# Patient Record
Sex: FEMALE | Race: WHITE | NOT HISPANIC OR LATINO | Employment: FULL TIME | URBAN - NONMETROPOLITAN AREA
[De-identification: names, ages, dates, MRNs, and addresses within clinical notes are randomized per-mention and may not be internally consistent; named-entity substitution may affect disease eponyms.]

---

## 2017-10-13 ENCOUNTER — TRANSCRIBE ORDERS (OUTPATIENT)
Dept: ADMINISTRATIVE | Facility: HOSPITAL | Age: 62
End: 2017-10-13

## 2017-10-13 DIAGNOSIS — Z12.31 ENCOUNTER FOR SCREENING MAMMOGRAM FOR MALIGNANT NEOPLASM OF BREAST: Primary | ICD-10-CM

## 2017-10-23 ENCOUNTER — HOSPITAL ENCOUNTER (OUTPATIENT)
Dept: MAMMOGRAPHY | Facility: HOSPITAL | Age: 62
Discharge: HOME | End: 2017-10-23
Admitting: FAMILY MEDICINE

## 2017-10-23 DIAGNOSIS — Z12.31 ENCOUNTER FOR SCREENING MAMMOGRAM FOR MALIGNANT NEOPLASM OF BREAST: ICD-10-CM

## 2017-10-23 PROCEDURE — 77063 BREAST TOMOSYNTHESIS BI: CPT

## 2017-10-23 PROCEDURE — G0202 SCR MAMMO BI INCL CAD: HCPCS

## 2018-10-17 ENCOUNTER — TRANSCRIBE ORDERS (OUTPATIENT)
Dept: ADMINISTRATIVE | Facility: HOSPITAL | Age: 63
End: 2018-10-17

## 2018-10-17 DIAGNOSIS — R92.8 ABNORMAL MAMMOGRAM: Primary | ICD-10-CM

## 2018-10-22 ENCOUNTER — APPOINTMENT (OUTPATIENT)
Dept: MAMMOGRAPHY | Facility: HOSPITAL | Age: 63
End: 2018-10-22

## 2018-10-29 ENCOUNTER — HOSPITAL ENCOUNTER (OUTPATIENT)
Dept: ULTRASOUND IMAGING | Facility: HOSPITAL | Age: 63
Discharge: HOME | End: 2018-10-29

## 2018-10-29 ENCOUNTER — HOSPITAL ENCOUNTER (OUTPATIENT)
Dept: MAMMOGRAPHY | Facility: HOSPITAL | Age: 63
Discharge: HOME | End: 2018-10-29
Admitting: FAMILY MEDICINE

## 2018-10-29 DIAGNOSIS — R92.8 ABNORMAL MAMMOGRAM: ICD-10-CM

## 2018-10-29 PROCEDURE — 77067 SCR MAMMO BI INCL CAD: CPT

## 2018-10-29 PROCEDURE — 77063 BREAST TOMOSYNTHESIS BI: CPT

## 2019-10-07 ENCOUNTER — TRANSCRIBE ORDERS (OUTPATIENT)
Dept: ADMINISTRATIVE | Facility: HOSPITAL | Age: 64
End: 2019-10-07

## 2019-10-07 DIAGNOSIS — Z12.31 ENCOUNTER FOR SCREENING MAMMOGRAM FOR MALIGNANT NEOPLASM OF BREAST: Primary | ICD-10-CM

## 2019-10-30 ENCOUNTER — HOSPITAL ENCOUNTER (OUTPATIENT)
Dept: MAMMOGRAPHY | Facility: HOSPITAL | Age: 64
Discharge: HOME | End: 2019-10-30
Admitting: FAMILY MEDICINE

## 2019-10-30 DIAGNOSIS — Z12.31 ENCOUNTER FOR SCREENING MAMMOGRAM FOR MALIGNANT NEOPLASM OF BREAST: ICD-10-CM

## 2019-10-30 PROCEDURE — 77063 BREAST TOMOSYNTHESIS BI: CPT

## 2019-10-30 PROCEDURE — 77067 SCR MAMMO BI INCL CAD: CPT

## 2020-09-28 ENCOUNTER — TRANSCRIBE ORDERS (OUTPATIENT)
Dept: ADMINISTRATIVE | Facility: HOSPITAL | Age: 65
End: 2020-09-28

## 2020-09-28 DIAGNOSIS — Z12.31 ENCOUNTER FOR SCREENING MAMMOGRAM FOR MALIGNANT NEOPLASM OF BREAST: Primary | ICD-10-CM

## 2020-11-02 ENCOUNTER — HOSPITAL ENCOUNTER (OUTPATIENT)
Dept: MAMMOGRAPHY | Facility: HOSPITAL | Age: 65
Discharge: HOME | End: 2020-11-02
Admitting: FAMILY MEDICINE

## 2020-11-02 DIAGNOSIS — Z12.31 ENCOUNTER FOR SCREENING MAMMOGRAM FOR MALIGNANT NEOPLASM OF BREAST: ICD-10-CM

## 2020-11-02 PROCEDURE — 77067 SCR MAMMO BI INCL CAD: CPT

## 2020-11-02 PROCEDURE — 77063 BREAST TOMOSYNTHESIS BI: CPT

## 2021-04-27 ENCOUNTER — APPOINTMENT (OUTPATIENT)
Dept: GENERAL RADIOLOGY | Age: 66
DRG: 378 | End: 2021-04-27
Payer: MEDICARE

## 2021-04-27 ENCOUNTER — APPOINTMENT (OUTPATIENT)
Dept: CT IMAGING | Age: 66
DRG: 378 | End: 2021-04-27
Payer: MEDICARE

## 2021-04-27 ENCOUNTER — HOSPITAL ENCOUNTER (INPATIENT)
Age: 66
LOS: 3 days | Discharge: HOME HEALTH | DRG: 378 | End: 2021-04-30
Attending: EMERGENCY MEDICINE | Admitting: FAMILY MEDICINE
Payer: MEDICARE

## 2021-04-27 DIAGNOSIS — K92.2 GASTROINTESTINAL HEMORRHAGE, UNSPECIFIED GASTROINTESTINAL HEMORRHAGE TYPE: Primary | ICD-10-CM

## 2021-04-27 DIAGNOSIS — S01.21XA LACERATION OF NOSE, INITIAL ENCOUNTER: ICD-10-CM

## 2021-04-27 DIAGNOSIS — K92.2 GI BLEED: ICD-10-CM

## 2021-04-27 DIAGNOSIS — E87.6 HYPOKALEMIA: ICD-10-CM

## 2021-04-27 DIAGNOSIS — R55 SYNCOPE AND COLLAPSE: ICD-10-CM

## 2021-04-27 DIAGNOSIS — S09.90XA CLOSED HEAD INJURY, INITIAL ENCOUNTER: ICD-10-CM

## 2021-04-27 PROBLEM — K21.9 GASTROESOPHAGEAL REFLUX DISEASE: Status: ACTIVE | Noted: 2021-04-27

## 2021-04-27 PROBLEM — D68.51 FACTOR V LEIDEN MUTATION (HCC): Status: ACTIVE | Noted: 2021-04-27

## 2021-04-27 PROBLEM — I10 HYPERTENSION: Status: ACTIVE | Noted: 2021-04-27

## 2021-04-27 PROBLEM — D68.62 LUPUS ANTICOAGULANT DISORDER (HCC): Status: ACTIVE | Noted: 2021-04-27

## 2021-04-27 PROBLEM — D68.59 PROTEIN S DEFICIENCY (HCC): Status: ACTIVE | Noted: 2021-04-27

## 2021-04-27 LAB
ABO/RH: NORMAL
ALBUMIN SERPL-MCNC: 3.7 G/DL (ref 3.5–5.2)
ALP BLD-CCNC: 50 U/L (ref 35–104)
ALT SERPL-CCNC: 7 U/L (ref 5–33)
ANION GAP SERPL CALCULATED.3IONS-SCNC: 13 MMOL/L (ref 7–19)
ANION GAP SERPL CALCULATED.3IONS-SCNC: 14 MMOL/L (ref 7–19)
ANTIBODY SCREEN: NORMAL
APTT: 32 SEC (ref 26–36.2)
AST SERPL-CCNC: 17 U/L (ref 5–32)
BASOPHILS ABSOLUTE: 0 K/UL (ref 0–0.2)
BASOPHILS RELATIVE PERCENT: 0.3 % (ref 0–1)
BILIRUB SERPL-MCNC: <0.2 MG/DL (ref 0.2–1.2)
BLOOD BANK DISPENSE STATUS: NORMAL
BLOOD BANK DISPENSE STATUS: NORMAL
BLOOD BANK PRODUCT CODE: NORMAL
BLOOD BANK PRODUCT CODE: NORMAL
BPU ID: NORMAL
BPU ID: NORMAL
BUN BLDV-MCNC: 51 MG/DL (ref 8–23)
BUN BLDV-MCNC: 54 MG/DL (ref 8–23)
CALCIUM SERPL-MCNC: 7 MG/DL (ref 8.8–10.2)
CALCIUM SERPL-MCNC: 7.3 MG/DL (ref 8.8–10.2)
CHLORIDE BLD-SCNC: 100 MMOL/L (ref 98–111)
CHLORIDE BLD-SCNC: 105 MMOL/L (ref 98–111)
CO2: 23 MMOL/L (ref 22–29)
CO2: 26 MMOL/L (ref 22–29)
CREAT SERPL-MCNC: 0.7 MG/DL (ref 0.5–0.9)
CREAT SERPL-MCNC: 0.8 MG/DL (ref 0.5–0.9)
DESCRIPTION BLOOD BANK: NORMAL
DESCRIPTION BLOOD BANK: NORMAL
EKG P AXIS: 74 DEGREES
EKG P-R INTERVAL: 108 MS
EKG Q-T INTERVAL: 380 MS
EKG QRS DURATION: 98 MS
EKG QTC CALCULATION (BAZETT): 430 MS
EKG T AXIS: 64 DEGREES
EOSINOPHILS ABSOLUTE: 0 K/UL (ref 0–0.6)
EOSINOPHILS RELATIVE PERCENT: 0.2 % (ref 0–5)
FERRITIN: 33.5 NG/ML (ref 13–150)
GFR AFRICAN AMERICAN: >59
GFR AFRICAN AMERICAN: >59
GFR NON-AFRICAN AMERICAN: >60
GFR NON-AFRICAN AMERICAN: >60
GLUCOSE BLD-MCNC: 100 MG/DL (ref 74–109)
GLUCOSE BLD-MCNC: 89 MG/DL (ref 74–109)
HAPTOGLOBIN: 250 MG/DL (ref 30–200)
HCT VFR BLD CALC: 20 % (ref 37–47)
HCT VFR BLD CALC: 21.9 % (ref 37–47)
HCT VFR BLD CALC: 22.5 % (ref 37–47)
HCT VFR BLD CALC: 25.8 % (ref 37–47)
HCT VFR BLD CALC: 28.9 % (ref 37–47)
HEMOGLOBIN: 6.6 G/DL (ref 12–16)
HEMOGLOBIN: 7.1 G/DL (ref 12–16)
HEMOGLOBIN: 7.2 G/DL (ref 12–16)
HEMOGLOBIN: 9.2 G/DL (ref 12–16)
IMMATURE GRANULOCYTES #: 0 K/UL
INR BLD: 2.88 (ref 0.88–1.18)
IRON SATURATION: 40 % (ref 14–50)
IRON: 122 UG/DL (ref 37–145)
LYMPHOCYTES ABSOLUTE: 1.3 K/UL (ref 1.1–4.5)
LYMPHOCYTES RELATIVE PERCENT: 14.5 % (ref 20–40)
MAGNESIUM: 1.6 MG/DL (ref 1.6–2.4)
MCH RBC QN AUTO: 29.1 PG (ref 27–31)
MCHC RBC AUTO-ENTMCNC: 31.8 G/DL (ref 33–37)
MCV RBC AUTO: 91.5 FL (ref 81–99)
MONOCYTES ABSOLUTE: 0.4 K/UL (ref 0–0.9)
MONOCYTES RELATIVE PERCENT: 4.7 % (ref 0–10)
NEUTROPHILS ABSOLUTE: 7.2 K/UL (ref 1.5–7.5)
NEUTROPHILS RELATIVE PERCENT: 80 % (ref 50–65)
PDW BLD-RTO: 13 % (ref 11.5–14.5)
PLATELET # BLD: 242 K/UL (ref 130–400)
PMV BLD AUTO: 9.8 FL (ref 9.4–12.3)
POTASSIUM REFLEX MAGNESIUM: 3.3 MMOL/L (ref 3.5–5)
POTASSIUM SERPL-SCNC: 3 MMOL/L (ref 3.5–5)
PROTHROMBIN TIME: 31 SEC (ref 12–14.6)
RBC # BLD: 3.16 M/UL (ref 4.2–5.4)
REASON FOR REJECTION: NORMAL
REJECTED TEST: NORMAL
RETICULOCYTE ABSOLUTE COUNT: 0.06 M/UL (ref 0.03–0.12)
RETICULOCYTE COUNT PCT: 2.03 % (ref 0.5–1.5)
SARS-COV-2, NAAT: NOT DETECTED
SODIUM BLD-SCNC: 140 MMOL/L (ref 136–145)
SODIUM BLD-SCNC: 141 MMOL/L (ref 136–145)
TOTAL IRON BINDING CAPACITY: 303 UG/DL (ref 250–400)
TOTAL PROTEIN: 5.8 G/DL (ref 6.6–8.7)
TROPONIN: <0.01 NG/ML (ref 0–0.03)
WBC # BLD: 9 K/UL (ref 4.8–10.8)

## 2021-04-27 PROCEDURE — 70450 CT HEAD/BRAIN W/O DYE: CPT

## 2021-04-27 PROCEDURE — 83550 IRON BINDING TEST: CPT

## 2021-04-27 PROCEDURE — 99222 1ST HOSP IP/OBS MODERATE 55: CPT | Performed by: INTERNAL MEDICINE

## 2021-04-27 PROCEDURE — C9113 INJ PANTOPRAZOLE SODIUM, VIA: HCPCS | Performed by: EMERGENCY MEDICINE

## 2021-04-27 PROCEDURE — 6360000002 HC RX W HCPCS: Performed by: EMERGENCY MEDICINE

## 2021-04-27 PROCEDURE — 2580000003 HC RX 258: Performed by: EMERGENCY MEDICINE

## 2021-04-27 PROCEDURE — 90715 TDAP VACCINE 7 YRS/> IM: CPT | Performed by: EMERGENCY MEDICINE

## 2021-04-27 PROCEDURE — 70486 CT MAXILLOFACIAL W/O DYE: CPT

## 2021-04-27 PROCEDURE — 85610 PROTHROMBIN TIME: CPT

## 2021-04-27 PROCEDURE — 83735 ASSAY OF MAGNESIUM: CPT

## 2021-04-27 PROCEDURE — 09QKXZZ REPAIR NASAL MUCOSA AND SOFT TISSUE, EXTERNAL APPROACH: ICD-10-PCS | Performed by: EMERGENCY MEDICINE

## 2021-04-27 PROCEDURE — 86901 BLOOD TYPING SEROLOGIC RH(D): CPT

## 2021-04-27 PROCEDURE — 85018 HEMOGLOBIN: CPT

## 2021-04-27 PROCEDURE — 1210000000 HC MED SURG R&B

## 2021-04-27 PROCEDURE — 6370000000 HC RX 637 (ALT 250 FOR IP): Performed by: HOSPITALIST

## 2021-04-27 PROCEDURE — 93005 ELECTROCARDIOGRAM TRACING: CPT | Performed by: EMERGENCY MEDICINE

## 2021-04-27 PROCEDURE — 12011 RPR F/E/E/N/L/M 2.5 CM/<: CPT

## 2021-04-27 PROCEDURE — 85045 AUTOMATED RETICULOCYTE COUNT: CPT

## 2021-04-27 PROCEDURE — 6360000002 HC RX W HCPCS: Performed by: HOSPITALIST

## 2021-04-27 PROCEDURE — 80053 COMPREHEN METABOLIC PANEL: CPT

## 2021-04-27 PROCEDURE — 2580000003 HC RX 258: Performed by: HOSPITALIST

## 2021-04-27 PROCEDURE — P9016 RBC LEUKOCYTES REDUCED: HCPCS

## 2021-04-27 PROCEDURE — 83540 ASSAY OF IRON: CPT

## 2021-04-27 PROCEDURE — 36430 TRANSFUSION BLD/BLD COMPNT: CPT

## 2021-04-27 PROCEDURE — 74177 CT ABD & PELVIS W/CONTRAST: CPT

## 2021-04-27 PROCEDURE — 84484 ASSAY OF TROPONIN QUANT: CPT

## 2021-04-27 PROCEDURE — 87635 SARS-COV-2 COVID-19 AMP PRB: CPT

## 2021-04-27 PROCEDURE — 6360000004 HC RX CONTRAST MEDICATION: Performed by: EMERGENCY MEDICINE

## 2021-04-27 PROCEDURE — 99284 EMERGENCY DEPT VISIT MOD MDM: CPT

## 2021-04-27 PROCEDURE — 85014 HEMATOCRIT: CPT

## 2021-04-27 PROCEDURE — 93010 ELECTROCARDIOGRAM REPORT: CPT | Performed by: INTERNAL MEDICINE

## 2021-04-27 PROCEDURE — 86920 COMPATIBILITY TEST SPIN: CPT

## 2021-04-27 PROCEDURE — 83010 ASSAY OF HAPTOGLOBIN QUANT: CPT

## 2021-04-27 PROCEDURE — 96374 THER/PROPH/DIAG INJ IV PUSH: CPT

## 2021-04-27 PROCEDURE — 86850 RBC ANTIBODY SCREEN: CPT

## 2021-04-27 PROCEDURE — 2500000003 HC RX 250 WO HCPCS: Performed by: HOSPITALIST

## 2021-04-27 PROCEDURE — 0DB78ZX EXCISION OF STOMACH, PYLORUS, VIA NATURAL OR ARTIFICIAL OPENING ENDOSCOPIC, DIAGNOSTIC: ICD-10-PCS | Performed by: INTERNAL MEDICINE

## 2021-04-27 PROCEDURE — 85025 COMPLETE CBC W/AUTO DIFF WBC: CPT

## 2021-04-27 PROCEDURE — 36415 COLL VENOUS BLD VENIPUNCTURE: CPT

## 2021-04-27 PROCEDURE — C9113 INJ PANTOPRAZOLE SODIUM, VIA: HCPCS | Performed by: HOSPITALIST

## 2021-04-27 PROCEDURE — 85730 THROMBOPLASTIN TIME PARTIAL: CPT

## 2021-04-27 PROCEDURE — 86900 BLOOD TYPING SEROLOGIC ABO: CPT

## 2021-04-27 PROCEDURE — 82728 ASSAY OF FERRITIN: CPT

## 2021-04-27 PROCEDURE — 90471 IMMUNIZATION ADMIN: CPT | Performed by: EMERGENCY MEDICINE

## 2021-04-27 PROCEDURE — 71045 X-RAY EXAM CHEST 1 VIEW: CPT

## 2021-04-27 PROCEDURE — P9017 PLASMA 1 DONOR FRZ W/IN 8 HR: HCPCS

## 2021-04-27 RX ORDER — CALCIUM GLUCONATE 20 MG/ML
1000 INJECTION, SOLUTION INTRAVENOUS ONCE
Status: COMPLETED | OUTPATIENT
Start: 2021-04-27 | End: 2021-04-27

## 2021-04-27 RX ORDER — HYDROCODONE BITARTRATE AND ACETAMINOPHEN 7.5; 325 MG/1; MG/1
1 TABLET ORAL 4 TIMES DAILY
Status: ON HOLD | COMMUNITY
End: 2022-06-21 | Stop reason: HOSPADM

## 2021-04-27 RX ORDER — SODIUM CHLORIDE 9 MG/ML
20 INJECTION INTRAVENOUS ONCE
Status: DISCONTINUED | OUTPATIENT
Start: 2021-04-27 | End: 2021-04-27

## 2021-04-27 RX ORDER — TRIAMTERENE AND HYDROCHLOROTHIAZIDE 37.5; 25 MG/1; MG/1
1 TABLET ORAL DAILY
Status: DISCONTINUED | OUTPATIENT
Start: 2021-04-27 | End: 2021-04-27

## 2021-04-27 RX ORDER — TRIAMTERENE AND HYDROCHLOROTHIAZIDE 37.5; 25 MG/1; MG/1
1 TABLET ORAL DAILY
Status: DISCONTINUED | OUTPATIENT
Start: 2021-04-27 | End: 2021-04-30 | Stop reason: HOSPADM

## 2021-04-27 RX ORDER — LIDOCAINE HYDROCHLORIDE 10 MG/ML
5 INJECTION, SOLUTION EPIDURAL; INFILTRATION; INTRACAUDAL; PERINEURAL ONCE
Status: DISCONTINUED | OUTPATIENT
Start: 2021-04-27 | End: 2021-04-30 | Stop reason: HOSPADM

## 2021-04-27 RX ORDER — VITAMIN B COMPLEX
2000 TABLET ORAL DAILY
Status: DISCONTINUED | OUTPATIENT
Start: 2021-04-27 | End: 2021-04-30 | Stop reason: HOSPADM

## 2021-04-27 RX ORDER — FLUOXETINE HYDROCHLORIDE 20 MG/1
20 CAPSULE ORAL DAILY
Status: DISCONTINUED | OUTPATIENT
Start: 2021-04-27 | End: 2021-04-30 | Stop reason: HOSPADM

## 2021-04-27 RX ORDER — PANTOPRAZOLE SODIUM 40 MG/10ML
80 INJECTION, POWDER, LYOPHILIZED, FOR SOLUTION INTRAVENOUS ONCE
Status: DISCONTINUED | OUTPATIENT
Start: 2021-04-27 | End: 2021-04-27

## 2021-04-27 RX ORDER — FLUOXETINE HYDROCHLORIDE 20 MG/1
20 CAPSULE ORAL DAILY
COMMUNITY

## 2021-04-27 RX ORDER — PANTOPRAZOLE SODIUM 40 MG/10ML
40 INJECTION, POWDER, LYOPHILIZED, FOR SOLUTION INTRAVENOUS ONCE
Status: COMPLETED | OUTPATIENT
Start: 2021-04-27 | End: 2021-04-27

## 2021-04-27 RX ORDER — ONDANSETRON 2 MG/ML
4 INJECTION INTRAMUSCULAR; INTRAVENOUS EVERY 6 HOURS PRN
Status: DISCONTINUED | OUTPATIENT
Start: 2021-04-27 | End: 2021-04-30 | Stop reason: HOSPADM

## 2021-04-27 RX ORDER — DIPHENHYDRAMINE HYDROCHLORIDE 50 MG/ML
INJECTION INTRAMUSCULAR; INTRAVENOUS
Status: DISPENSED
Start: 2021-04-27 | End: 2021-04-28

## 2021-04-27 RX ORDER — DIPHENHYDRAMINE HYDROCHLORIDE 50 MG/ML
25 INJECTION INTRAMUSCULAR; INTRAVENOUS ONCE
Status: COMPLETED | OUTPATIENT
Start: 2021-04-27 | End: 2021-04-27

## 2021-04-27 RX ORDER — PREGABALIN 50 MG/1
50 CAPSULE ORAL 3 TIMES DAILY
Status: DISCONTINUED | OUTPATIENT
Start: 2021-04-27 | End: 2021-04-30 | Stop reason: HOSPADM

## 2021-04-27 RX ORDER — WARFARIN SODIUM 3 MG/1
TABLET ORAL DAILY
Status: ON HOLD | COMMUNITY
End: 2021-04-30 | Stop reason: HOSPADM

## 2021-04-27 RX ORDER — ONDANSETRON 4 MG/1
4 TABLET, ORALLY DISINTEGRATING ORAL EVERY 8 HOURS PRN
Status: DISCONTINUED | OUTPATIENT
Start: 2021-04-27 | End: 2021-04-30 | Stop reason: HOSPADM

## 2021-04-27 RX ORDER — IODINE/SODIUM IODIDE 2 %
TINCTURE TOPICAL PRN
Status: DISCONTINUED | OUTPATIENT
Start: 2021-04-27 | End: 2021-04-30 | Stop reason: HOSPADM

## 2021-04-27 RX ORDER — PANTOPRAZOLE SODIUM 40 MG/10ML
40 INJECTION, POWDER, LYOPHILIZED, FOR SOLUTION INTRAVENOUS 2 TIMES DAILY
Status: DISCONTINUED | OUTPATIENT
Start: 2021-04-30 | End: 2021-04-27

## 2021-04-27 RX ORDER — SODIUM CHLORIDE 9 MG/ML
25 INJECTION, SOLUTION INTRAVENOUS PRN
Status: DISCONTINUED | OUTPATIENT
Start: 2021-04-27 | End: 2021-04-30 | Stop reason: HOSPADM

## 2021-04-27 RX ORDER — ACETAMINOPHEN 650 MG/1
650 SUPPOSITORY RECTAL EVERY 6 HOURS PRN
Status: DISCONTINUED | OUTPATIENT
Start: 2021-04-27 | End: 2021-04-30 | Stop reason: HOSPADM

## 2021-04-27 RX ORDER — PANTOPRAZOLE SODIUM 40 MG/10ML
40 INJECTION, POWDER, LYOPHILIZED, FOR SOLUTION INTRAVENOUS 2 TIMES DAILY
Status: DISCONTINUED | OUTPATIENT
Start: 2021-04-30 | End: 2021-04-30 | Stop reason: HOSPADM

## 2021-04-27 RX ORDER — WARFARIN SODIUM 1 MG/1
1 TABLET ORAL
Status: ON HOLD | COMMUNITY
End: 2021-04-30 | Stop reason: HOSPADM

## 2021-04-27 RX ORDER — ACETAMINOPHEN 325 MG/1
650 TABLET ORAL EVERY 6 HOURS PRN
Status: DISCONTINUED | OUTPATIENT
Start: 2021-04-27 | End: 2021-04-30 | Stop reason: HOSPADM

## 2021-04-27 RX ORDER — SODIUM CHLORIDE 9 MG/ML
10 INJECTION INTRAVENOUS ONCE
Status: COMPLETED | OUTPATIENT
Start: 2021-04-27 | End: 2021-04-27

## 2021-04-27 RX ORDER — POTASSIUM CHLORIDE 7.45 MG/ML
10 INJECTION INTRAVENOUS ONCE
Status: COMPLETED | OUTPATIENT
Start: 2021-04-27 | End: 2021-04-27

## 2021-04-27 RX ORDER — SODIUM CHLORIDE 9 MG/ML
10 INJECTION INTRAVENOUS 2 TIMES DAILY
Status: DISCONTINUED | OUTPATIENT
Start: 2021-04-30 | End: 2021-04-30 | Stop reason: HOSPADM

## 2021-04-27 RX ORDER — CHOLECALCIFEROL (VITAMIN D3) 100000/G
POWDER (GRAM) MISCELLANEOUS
Status: ON HOLD | COMMUNITY
End: 2021-06-25 | Stop reason: CLARIF

## 2021-04-27 RX ORDER — POTASSIUM CHLORIDE 20 MEQ/1
40 TABLET, EXTENDED RELEASE ORAL ONCE
Status: COMPLETED | OUTPATIENT
Start: 2021-04-27 | End: 2021-04-27

## 2021-04-27 RX ORDER — SODIUM CHLORIDE 0.9 % (FLUSH) 0.9 %
5-40 SYRINGE (ML) INJECTION EVERY 12 HOURS SCHEDULED
Status: DISCONTINUED | OUTPATIENT
Start: 2021-04-27 | End: 2021-04-30 | Stop reason: HOSPADM

## 2021-04-27 RX ORDER — SODIUM CHLORIDE 9 MG/ML
10 INJECTION INTRAVENOUS 2 TIMES DAILY
Status: DISCONTINUED | OUTPATIENT
Start: 2021-04-30 | End: 2021-04-27

## 2021-04-27 RX ORDER — SODIUM CHLORIDE 9 MG/ML
INJECTION, SOLUTION INTRAVENOUS PRN
Status: DISCONTINUED | OUTPATIENT
Start: 2021-04-27 | End: 2021-04-28 | Stop reason: ALTCHOICE

## 2021-04-27 RX ORDER — PREGABALIN 50 MG/1
50 CAPSULE ORAL 2 TIMES DAILY
COMMUNITY

## 2021-04-27 RX ORDER — 0.9 % SODIUM CHLORIDE 0.9 %
1000 INTRAVENOUS SOLUTION INTRAVENOUS ONCE
Status: COMPLETED | OUTPATIENT
Start: 2021-04-27 | End: 2021-04-27

## 2021-04-27 RX ORDER — SODIUM CHLORIDE 0.9 % (FLUSH) 0.9 %
5-40 SYRINGE (ML) INJECTION PRN
Status: DISCONTINUED | OUTPATIENT
Start: 2021-04-27 | End: 2021-04-30 | Stop reason: HOSPADM

## 2021-04-27 RX ADMIN — Medication 2000 UNITS: at 08:26

## 2021-04-27 RX ADMIN — TETANUS TOXOID, REDUCED DIPHTHERIA TOXOID AND ACELLULAR PERTUSSIS VACCINE, ADSORBED 0.5 ML: 5; 2.5; 8; 8; 2.5 SUSPENSION INTRAMUSCULAR at 01:36

## 2021-04-27 RX ADMIN — PANTOPRAZOLE SODIUM 40 MG: 40 INJECTION, POWDER, FOR SOLUTION INTRAVENOUS at 04:37

## 2021-04-27 RX ADMIN — IOPAMIDOL 90 ML: 755 INJECTION, SOLUTION INTRAVENOUS at 02:31

## 2021-04-27 RX ADMIN — DIPHENHYDRAMINE HYDROCHLORIDE 25 MG: 50 INJECTION, SOLUTION INTRAMUSCULAR; INTRAVENOUS at 23:15

## 2021-04-27 RX ADMIN — POTASSIUM CHLORIDE 40 MEQ: 1500 TABLET, EXTENDED RELEASE ORAL at 03:23

## 2021-04-27 RX ADMIN — SODIUM CHLORIDE 8 MG/HR: 9 INJECTION, SOLUTION INTRAVENOUS at 04:37

## 2021-04-27 RX ADMIN — FLUOXETINE HYDROCHLORIDE 20 MG: 20 CAPSULE ORAL at 08:25

## 2021-04-27 RX ADMIN — PREGABALIN 50 MG: 50 CAPSULE ORAL at 23:35

## 2021-04-27 RX ADMIN — SODIUM CHLORIDE, PRESERVATIVE FREE 10 ML: 5 INJECTION INTRAVENOUS at 04:37

## 2021-04-27 RX ADMIN — TRIAMTERENE AND HYDROCHLOROTHIAZIDE 1 TABLET: 37.5; 25 TABLET ORAL at 08:26

## 2021-04-27 RX ADMIN — POTASSIUM CHLORIDE 10 MEQ: 7.46 INJECTION, SOLUTION INTRAVENOUS at 03:23

## 2021-04-27 RX ADMIN — FERRIC OXIDE RED: 8; 8 LOTION TOPICAL at 23:35

## 2021-04-27 RX ADMIN — CALCIUM GLUCONATE 1000 MG: 20 INJECTION, SOLUTION INTRAVENOUS at 08:34

## 2021-04-27 RX ADMIN — PREGABALIN 50 MG: 50 CAPSULE ORAL at 08:25

## 2021-04-27 RX ADMIN — SODIUM CHLORIDE 1000 ML: 9 INJECTION, SOLUTION INTRAVENOUS at 01:25

## 2021-04-27 RX ADMIN — PANTOPRAZOLE SODIUM 40 MG: 40 INJECTION, POWDER, FOR SOLUTION INTRAVENOUS at 01:36

## 2021-04-27 SDOH — ECONOMIC STABILITY: INCOME INSECURITY: HOW HARD IS IT FOR YOU TO PAY FOR THE VERY BASICS LIKE FOOD, HOUSING, MEDICAL CARE, AND HEATING?: NOT ASKED

## 2021-04-27 SDOH — ECONOMIC STABILITY: TRANSPORTATION INSECURITY
IN THE PAST 12 MONTHS, HAS THE LACK OF TRANSPORTATION KEPT YOU FROM MEDICAL APPOINTMENTS OR FROM GETTING MEDICATIONS?: NOT ASKED

## 2021-04-27 ASSESSMENT — ENCOUNTER SYMPTOMS
COUGH: 0
DIARRHEA: 0
SORE THROAT: 0
SHORTNESS OF BREATH: 1
BACK PAIN: 0
NAUSEA: 0
ABDOMINAL PAIN: 0
BLOOD IN STOOL: 1
RHINORRHEA: 0
VOMITING: 0
DIARRHEA: 1

## 2021-04-27 NOTE — ED NOTES
Bed: 05  Expected date:   Expected time:   Means of arrival:   Comments:  [address], RN  04/27/21 0801

## 2021-04-27 NOTE — H&P
Patient Information:  Patient: Pinky Ogden  MRN: [id number]   Radha Husseinwer: [de-identified]  YOB: 1955  Admit Date: 4/27/2021      Primary Care Physician: Cinda Chao  Advance Directive: Full Code   Health Care Proxy: Mr. Alek Ambrosio, her , [phone]        SUBJECTIVE:    Chief Complaint   Patient presents with    Loss of Consciousness    Rectal Bleeding     EP Sign Out:  Lac on nose repaired   Hb in 2012 was 12.6   Tonight 9.2   INR 2.88, is on warfarin as per Factor 5 leiden def    HPI:  Mrs. Pinky Ogden is a pleasant 77year old  american lady who was at home tonight. She was in the bathroom and started to feel light headed. She states that prior to her being able to sit down she blacked out and fell forwards. Her son heard the fall and found her. She states that she has had black diarrhea for stools (melena) since Saturday. She never had anything like this before. She was supposed to go to the MD as her  was insisting, but this happened prior to when she could go to see the MD. Her last conoloscopy was 4 years ago, she had a single polyp and she was supposed to have it rechecked the next year but never did. She had an EGD performed at about that time and there were several ulcers seen, she was on medicine for this for several years and had Q3mo EGD over that interim. She states that there is nothing more she can tell us about this. She states that the ulcers never bled to her knowledge. She denies any history of liver disease, alcohol use, or hepatitis. Review of Systems:   Review of Systems   Constitutional: Positive for chills and fatigue. Negative for diaphoresis and fever. Respiratory: Positive for shortness of breath. Cardiovascular: Negative for chest pain, palpitations and leg swelling. Gastrointestinal: Positive for diarrhea. Neurological: Positive for syncope, weakness, light-headedness and headaches.    Psychiatric/Behavioral: Negative for two midnights due to medical therapy and or critical care needs, otherwise patients are placed to OBServation status. Signed:  Electronically signed by Alix Rooney MD on 4/27/21 at 03:55 CDT.

## 2021-04-27 NOTE — PLAN OF CARE
Chart Note    Date:2021  Patient: Buster Hickey  : 1955  FVZ:[id number]  PCP:Jessie Mendez    Patient was admitted  by nocturnist physician. Patient seen during a.m. rounds plan of care as follows:       Patient is a 57-year-old obese  female with a known past medical history of hypertension, factor V Leiden mutation, lupus anticoagulant/protein S deficiency anticoagulated with Coumadin, depression/anxiety presenting to emergency room due to episode of syncopal event, melena. Gastrointestinal bleeding  -Covid and therapy has been held  -Continues on Protonix currently n.p.o.  -Every 6 hours H/H, most recent 7.2/22.5, will transfuse for hemoglobin less than 7  -GI been consulted, appreciate recommendations      History of factor V Leiden/lupus anticoagulant/protein S deficiency  -Currently holding anticoagulation in the setting of GI bleed      Moderately displaced fracture of the right nasal bonenose laceration sutured from the emergency room, ENT has been consulted for any further recommendations      Hypokalemianoted to be 3.3, continue with sliding scale supplementation      Gastroesophageal reflux diseasechronic condition prior history of ulcer disease, continue with Protonix      Depression/anxietychronic condition, resume SSRI therapy    Essential hypertensionchronic condition, continue antihypertensive regimen, routine vitals      DVT prophylaxisCoumadin currently held      EMR Dragon/Transcription disclaimer:   Much of this encounter note is an electronic transcription/translation of spoken language to printed text.  The electronic translation of spoken language may permit erroneous, or at times, nonsensical words or phrases to be inadvertently transcribed; although attempts have made to review the note for such errors, some may still exist.    Electronically signed by   Marilyn Dumont   Internal Medicine Hospitalist  On 2021  At 9:18 AM

## 2021-04-27 NOTE — ED NOTES
PT c/o rectal bleeding x 3 days, PT c/o loss of consciousness tonight, PT has laceration noted to nose     Bhavik Resendiz RN  04/27/21 9971

## 2021-04-27 NOTE — PROGRESS NOTES
Lynn Dockery arrived to room # 504. Presented with: syncope, gi bleed  Mental Status: Patient is oriented, alert, coherent, logical, thought processes intact and able to concentrate and follow conversation. Vitals:    04/27/21 0335   BP: 129/68   Pulse: 84   Resp: 20   Temp: 98.7 °F (37.1 °C)   SpO2: 100%     Patient safety contract and falls prevention contract reviewed with patient Yes. Oriented Patient to room. Call light within reach. Yes.   Needs, issues or concerns expressed at this time: no.      Electronically signed by Louann Cheng RN on 4/27/2021 at 4:51 AM

## 2021-04-28 ENCOUNTER — ANESTHESIA EVENT (OUTPATIENT)
Dept: ENDOSCOPY | Age: 66
DRG: 378 | End: 2021-04-28
Payer: MEDICARE

## 2021-04-28 ENCOUNTER — ANESTHESIA (OUTPATIENT)
Dept: ENDOSCOPY | Age: 66
DRG: 378 | End: 2021-04-28
Payer: MEDICARE

## 2021-04-28 VITALS
OXYGEN SATURATION: 99 % | RESPIRATION RATE: 17 BRPM | SYSTOLIC BLOOD PRESSURE: 104 MMHG | DIASTOLIC BLOOD PRESSURE: 55 MMHG

## 2021-04-28 PROBLEM — K92.2 GI BLEED: Status: ACTIVE | Noted: 2021-04-27

## 2021-04-28 LAB
ANION GAP SERPL CALCULATED.3IONS-SCNC: 9 MMOL/L (ref 7–19)
BLOOD BANK DISPENSE STATUS: NORMAL
BLOOD BANK DISPENSE STATUS: NORMAL
BLOOD BANK PRODUCT CODE: NORMAL
BLOOD BANK PRODUCT CODE: NORMAL
BPU ID: NORMAL
BPU ID: NORMAL
BUN BLDV-MCNC: 27 MG/DL (ref 8–23)
CALCIUM SERPL-MCNC: 7.4 MG/DL (ref 8.8–10.2)
CHLORIDE BLD-SCNC: 107 MMOL/L (ref 98–111)
CO2: 27 MMOL/L (ref 22–29)
CREAT SERPL-MCNC: 0.8 MG/DL (ref 0.5–0.9)
DESCRIPTION BLOOD BANK: NORMAL
DESCRIPTION BLOOD BANK: NORMAL
GFR AFRICAN AMERICAN: >59
GFR NON-AFRICAN AMERICAN: >60
GLUCOSE BLD-MCNC: 88 MG/DL (ref 74–109)
HCT VFR BLD CALC: 21 % (ref 37–47)
HCT VFR BLD CALC: 22.2 % (ref 37–47)
HCT VFR BLD CALC: 24.9 % (ref 37–47)
HCT VFR BLD CALC: 26.3 % (ref 37–47)
HEMOGLOBIN: 6.9 G/DL (ref 12–16)
HEMOGLOBIN: 7.2 G/DL (ref 12–16)
HEMOGLOBIN: 8 G/DL (ref 12–16)
HEMOGLOBIN: 8.2 G/DL (ref 12–16)
INR BLD: 2.52 (ref 0.88–1.18)
MAGNESIUM: 1.7 MG/DL (ref 1.6–2.4)
MCH RBC QN AUTO: 29.3 PG (ref 27–31)
MCHC RBC AUTO-ENTMCNC: 32.4 G/DL (ref 33–37)
MCV RBC AUTO: 90.2 FL (ref 81–99)
PATHOLOGIST REPORT, TRANSFUSION REACTION: NORMAL
PDW BLD-RTO: 15.1 % (ref 11.5–14.5)
PLATELET # BLD: 170 K/UL (ref 130–400)
PMV BLD AUTO: 9.5 FL (ref 9.4–12.3)
POTASSIUM REFLEX MAGNESIUM: 3.2 MMOL/L (ref 3.5–5)
PROTHROMBIN TIME: 27.8 SEC (ref 12–14.6)
RBC # BLD: 2.46 M/UL (ref 4.2–5.4)
SODIUM BLD-SCNC: 143 MMOL/L (ref 136–145)
WBC # BLD: 5.7 K/UL (ref 4.8–10.8)

## 2021-04-28 PROCEDURE — 6370000000 HC RX 637 (ALT 250 FOR IP): Performed by: INTERNAL MEDICINE

## 2021-04-28 PROCEDURE — 85610 PROTHROMBIN TIME: CPT

## 2021-04-28 PROCEDURE — 99232 SBSQ HOSP IP/OBS MODERATE 35: CPT | Performed by: INTERNAL MEDICINE

## 2021-04-28 PROCEDURE — 2580000003 HC RX 258: Performed by: INTERNAL MEDICINE

## 2021-04-28 PROCEDURE — P9016 RBC LEUKOCYTES REDUCED: HCPCS

## 2021-04-28 PROCEDURE — 36415 COLL VENOUS BLD VENIPUNCTURE: CPT

## 2021-04-28 PROCEDURE — 1210000000 HC MED SURG R&B

## 2021-04-28 PROCEDURE — 6360000002 HC RX W HCPCS: Performed by: INTERNAL MEDICINE

## 2021-04-28 PROCEDURE — 83735 ASSAY OF MAGNESIUM: CPT

## 2021-04-28 PROCEDURE — 6360000002 HC RX W HCPCS: Performed by: FAMILY MEDICINE

## 2021-04-28 PROCEDURE — 7100000001 HC PACU RECOVERY - ADDTL 15 MIN: Performed by: INTERNAL MEDICINE

## 2021-04-28 PROCEDURE — C9113 INJ PANTOPRAZOLE SODIUM, VIA: HCPCS | Performed by: INTERNAL MEDICINE

## 2021-04-28 PROCEDURE — 36430 TRANSFUSION BLD/BLD COMPNT: CPT

## 2021-04-28 PROCEDURE — 2580000003 HC RX 258: Performed by: NURSE ANESTHETIST, CERTIFIED REGISTERED

## 2021-04-28 PROCEDURE — 3609017100 HC EGD: Performed by: INTERNAL MEDICINE

## 2021-04-28 PROCEDURE — 85027 COMPLETE CBC AUTOMATED: CPT

## 2021-04-28 PROCEDURE — 2500000003 HC RX 250 WO HCPCS: Performed by: NURSE ANESTHETIST, CERTIFIED REGISTERED

## 2021-04-28 PROCEDURE — 3700000001 HC ADD 15 MINUTES (ANESTHESIA): Performed by: INTERNAL MEDICINE

## 2021-04-28 PROCEDURE — 43239 EGD BIOPSY SINGLE/MULTIPLE: CPT | Performed by: INTERNAL MEDICINE

## 2021-04-28 PROCEDURE — 85014 HEMATOCRIT: CPT

## 2021-04-28 PROCEDURE — 3700000000 HC ANESTHESIA ATTENDED CARE: Performed by: INTERNAL MEDICINE

## 2021-04-28 PROCEDURE — 7100000000 HC PACU RECOVERY - FIRST 15 MIN: Performed by: INTERNAL MEDICINE

## 2021-04-28 PROCEDURE — C9132 KCENTRA, PER I.U.: HCPCS | Performed by: INTERNAL MEDICINE

## 2021-04-28 PROCEDURE — 85018 HEMOGLOBIN: CPT

## 2021-04-28 PROCEDURE — 6360000002 HC RX W HCPCS: Performed by: NURSE ANESTHETIST, CERTIFIED REGISTERED

## 2021-04-28 PROCEDURE — 2709999900 HC NON-CHARGEABLE SUPPLY: Performed by: INTERNAL MEDICINE

## 2021-04-28 PROCEDURE — 80048 BASIC METABOLIC PNL TOTAL CA: CPT

## 2021-04-28 RX ORDER — ONDANSETRON 2 MG/ML
4 INJECTION INTRAMUSCULAR; INTRAVENOUS
Status: DISCONTINUED | OUTPATIENT
Start: 2021-04-28 | End: 2021-04-28 | Stop reason: HOSPADM

## 2021-04-28 RX ORDER — POTASSIUM CHLORIDE 20 MEQ/1
40 TABLET, EXTENDED RELEASE ORAL PRN
Status: DISCONTINUED | OUTPATIENT
Start: 2021-04-28 | End: 2021-04-30 | Stop reason: HOSPADM

## 2021-04-28 RX ORDER — SODIUM CHLORIDE 0.9 % (FLUSH) 0.9 %
10 SYRINGE (ML) INJECTION PRN
Status: DISCONTINUED | OUTPATIENT
Start: 2021-04-28 | End: 2021-04-28 | Stop reason: HOSPADM

## 2021-04-28 RX ORDER — PROPOFOL 10 MG/ML
INJECTION, EMULSION INTRAVENOUS PRN
Status: DISCONTINUED | OUTPATIENT
Start: 2021-04-28 | End: 2021-04-28 | Stop reason: SDUPTHER

## 2021-04-28 RX ORDER — SODIUM CHLORIDE, SODIUM LACTATE, POTASSIUM CHLORIDE, CALCIUM CHLORIDE 600; 310; 30; 20 MG/100ML; MG/100ML; MG/100ML; MG/100ML
INJECTION, SOLUTION INTRAVENOUS CONTINUOUS
Status: DISCONTINUED | OUTPATIENT
Start: 2021-04-28 | End: 2021-04-28

## 2021-04-28 RX ORDER — SUCRALFATE 1 G/1
1 TABLET ORAL EVERY 6 HOURS SCHEDULED
Status: DISCONTINUED | OUTPATIENT
Start: 2021-04-28 | End: 2021-04-30 | Stop reason: HOSPADM

## 2021-04-28 RX ORDER — FENTANYL CITRATE 50 UG/ML
50 INJECTION, SOLUTION INTRAMUSCULAR; INTRAVENOUS EVERY 5 MIN PRN
Status: DISCONTINUED | OUTPATIENT
Start: 2021-04-28 | End: 2021-04-28 | Stop reason: HOSPADM

## 2021-04-28 RX ORDER — SODIUM CHLORIDE 0.9 % (FLUSH) 0.9 %
10 SYRINGE (ML) INJECTION EVERY 12 HOURS SCHEDULED
Status: DISCONTINUED | OUTPATIENT
Start: 2021-04-28 | End: 2021-04-28 | Stop reason: HOSPADM

## 2021-04-28 RX ORDER — SODIUM CHLORIDE 9 MG/ML
25 INJECTION, SOLUTION INTRAVENOUS PRN
Status: DISCONTINUED | OUTPATIENT
Start: 2021-04-28 | End: 2021-04-28 | Stop reason: HOSPADM

## 2021-04-28 RX ORDER — MORPHINE SULFATE 4 MG/ML
1 INJECTION, SOLUTION INTRAMUSCULAR; INTRAVENOUS EVERY 4 HOURS PRN
Status: DISCONTINUED | OUTPATIENT
Start: 2021-04-28 | End: 2021-04-30 | Stop reason: HOSPADM

## 2021-04-28 RX ORDER — SODIUM CHLORIDE 9 MG/ML
INJECTION, SOLUTION INTRAVENOUS PRN
Status: DISCONTINUED | OUTPATIENT
Start: 2021-04-28 | End: 2021-04-30 | Stop reason: HOSPADM

## 2021-04-28 RX ORDER — LIDOCAINE HYDROCHLORIDE 10 MG/ML
INJECTION, SOLUTION EPIDURAL; INFILTRATION; INTRACAUDAL; PERINEURAL PRN
Status: DISCONTINUED | OUTPATIENT
Start: 2021-04-28 | End: 2021-04-28 | Stop reason: SDUPTHER

## 2021-04-28 RX ORDER — SODIUM CHLORIDE 9 MG/ML
INJECTION, SOLUTION INTRAVENOUS CONTINUOUS PRN
Status: DISCONTINUED | OUTPATIENT
Start: 2021-04-28 | End: 2021-04-28 | Stop reason: SDUPTHER

## 2021-04-28 RX ORDER — SODIUM CHLORIDE 9 MG/ML
INJECTION, SOLUTION INTRAVENOUS PRN
Status: DISCONTINUED | OUTPATIENT
Start: 2021-04-28 | End: 2021-04-28 | Stop reason: ALTCHOICE

## 2021-04-28 RX ORDER — HYDROMORPHONE HYDROCHLORIDE 1 MG/ML
0.25 INJECTION, SOLUTION INTRAMUSCULAR; INTRAVENOUS; SUBCUTANEOUS EVERY 5 MIN PRN
Status: DISCONTINUED | OUTPATIENT
Start: 2021-04-28 | End: 2021-04-28 | Stop reason: HOSPADM

## 2021-04-28 RX ORDER — POTASSIUM CHLORIDE 7.45 MG/ML
10 INJECTION INTRAVENOUS PRN
Status: DISCONTINUED | OUTPATIENT
Start: 2021-04-28 | End: 2021-04-30 | Stop reason: HOSPADM

## 2021-04-28 RX ORDER — HYDROMORPHONE HYDROCHLORIDE 1 MG/ML
0.5 INJECTION, SOLUTION INTRAMUSCULAR; INTRAVENOUS; SUBCUTANEOUS EVERY 5 MIN PRN
Status: DISCONTINUED | OUTPATIENT
Start: 2021-04-28 | End: 2021-04-28 | Stop reason: HOSPADM

## 2021-04-28 RX ADMIN — SUCRALFATE 1 G: 1 TABLET ORAL at 17:57

## 2021-04-28 RX ADMIN — PREGABALIN 50 MG: 50 CAPSULE ORAL at 14:05

## 2021-04-28 RX ADMIN — PREGABALIN 50 MG: 50 CAPSULE ORAL at 20:15

## 2021-04-28 RX ADMIN — SODIUM CHLORIDE: 9 INJECTION, SOLUTION INTRAVENOUS at 11:28

## 2021-04-28 RX ADMIN — SODIUM CHLORIDE 8 MG/HR: 9 INJECTION, SOLUTION INTRAVENOUS at 17:58

## 2021-04-28 RX ADMIN — PROPOFOL 550 MG: 10 INJECTION, EMULSION INTRAVENOUS at 11:36

## 2021-04-28 RX ADMIN — SUCRALFATE 1 G: 1 TABLET ORAL at 14:04

## 2021-04-28 RX ADMIN — Medication 1 MG: at 11:04

## 2021-04-28 RX ADMIN — PROTHROMBIN, COAGULATION FACTOR VII HUMAN, COAGULATION FACTOR IX HUMAN, COAGULATION FACTOR X HUMAN, PROTEIN C, PROTEIN S HUMAN, AND WATER 1975 UNITS: KIT at 10:52

## 2021-04-28 RX ADMIN — LIDOCAINE HYDROCHLORIDE 3 ML: 10 INJECTION, SOLUTION EPIDURAL; INFILTRATION; INTRACAUDAL; PERINEURAL at 11:36

## 2021-04-28 RX ADMIN — SODIUM CHLORIDE, PRESERVATIVE FREE 10 ML: 5 INJECTION INTRAVENOUS at 20:15

## 2021-04-28 ASSESSMENT — LIFESTYLE VARIABLES: SMOKING_STATUS: 0

## 2021-04-28 NOTE — PROGRESS NOTES
Progress Note  Date:2021       Room:0504/504-01  Patient Name:Julieta Levi     YOB: 1955     Age:65 y.o. Subjective    Subjective   Patient seen and examined this a.m. fatigued in nature, dosed with FFP overnight due to INR elevation. Note of swelling of right eye. Patient currently n.p.o. in anticipation of EGD today. Denies any headache, chest pain, shortness of breath, fevers or chills. Cumulative hospital course: Patient was admitted 327, 24-year-old  female past medical history of hypertension, factor V Leiden mutation, lupus anticoagulant/protein S deficiency anticoagulated with Coumadin, depression/anxiety. Admitted due to GI bleed, evidence of syncopal event in which patient suffered nasal fracture laceration was sutured in the emergency room. Note of INR elevation and with prior history patient dosed with FFP, received unit packed red blood cells. GI was consulted maintained on Protonix. Underwent EGD  -evidence of antral ulcer which was probable origin of GI bleed currently stopped. Awaiting ENT evaluation. Review of Systems   ROS: 14 point review of systems is negative except as specifically addressed above. Objective         Vitals Last 24 Hours:  TEMPERATURE:  Temp  Av.4 °F (36.9 °C)  Min: 98 °F (36.7 °C)  Max: 99.4 °F (37.4 °C)  RESPIRATIONS RANGE: Resp  Av.7  Min: 10  Max: 23  PULSE OXIMETRY RANGE: SpO2  Av.5 %  Min: 85 %  Max: 100 %  PULSE RANGE: Pulse  Av.1  Min: 75  Max: 105  BLOOD PRESSURE RANGE: Systolic (75JGE), PCV:216 , Min:91 , GSJ:528   ; Diastolic (04JDH), ERI:13, Min:54, Max:75    I/O (24Hr): Intake/Output Summary (Last 24 hours) at 2021 1316  Last data filed at 2021 0525  Gross per 24 hour   Intake 623 ml   Output 700 ml   Net -77 ml     Physical Exam  Vitals signs and nursing note reviewed.    Constitutional:       Comments: Fatigued appearing   HENT:      Nose:      Comments: Right nasal laceration currently sutured    Tenderness  Eyes:      General:         Right eye: No discharge. Left eye: No discharge. Comments: Right eyelid swelling   Cardiovascular:      Rate and Rhythm: Normal rate. Rhythm irregular. Pulses: Normal pulses. Pulmonary:      Effort: Pulmonary effort is normal.   Abdominal:      General: Bowel sounds are normal.      Tenderness: There is no abdominal tenderness. There is no guarding or rebound. Musculoskeletal:      Right lower leg: No edema. Left lower leg: No edema. Skin:     Coloration: Skin is pale. Neurological:      Mental Status: She is alert and oriented to person, place, and time. Mental status is at baseline. Psychiatric:         Mood and Affect: Mood normal.         Labs/Imaging/Diagnostics    Labs:  CBC:  Recent Labs     04/27/21  0116 04/27/21  0116 04/27/21  2106 04/28/21  0609 04/28/21  0857   WBC 9.0  --   --  5.7  --    RBC 3.16*  --   --  2.46*  --    HGB 9.2*   < > 6.6* 7.2* 6.9*   HCT 28.9*   < > 20.0* 22.2* 21.0*   MCV 91.5  --   --  90.2  --    RDW 13.0  --   --  15.1*  --      --   --  170  --     < > = values in this interval not displayed. CHEMISTRIES:  Recent Labs     04/27/21  0132 04/27/21  0511 04/28/21  0609    141 143   K 3.0* 3.3* 3.2*    105 107   CO2 26 23 27   BUN 54* 51* 27*   CREATININE 0.8 0.7 0.8   GLUCOSE 100 89 88   MG  --  1.6 1.7     PT/INR:  Recent Labs     04/27/21  0000 04/28/21  0609   PROTIME 31.0* 27.8*   INR 2.88* 2.52*     APTT:  Recent Labs     04/27/21  0000   APTT 32.0     LIVER PROFILE:  Recent Labs     04/27/21  0132   AST 17   ALT 7   BILITOT <0.2   ALKPHOS 50       Imaging Last 24 Hours:  Ct Head Wo Contrast    Result Date: 4/27/2021  Examination. CT HEAD WO CONTRAST 4/27/2021 1:27 AM History: The patient fell. The patient is on Coumadin. DLP: 824 mGycm. The CT scan of the head is performed without intravenous contrast enhancement.  The images are acquired in axial bony fragments. A nondisplaced fracture of the anterior nasal spine. The above study was initially reviewed and reported by [facility]. I do not find any discrepancies. Signed by Dr Leslie Razo on 4/27/2021 6:49 AM    Ct Abdomen Pelvis W Iv Contrast Additional Contrast? None    Result Date: 4/27/2021  Examination. CT ABDOMEN PELVIS W IV CONTRAST 4/27/2021 1:27 AM History: Abdominal pain and rectal bleeding. DLP: 1493 mGycm. The CT scan of the abdomen and pelvis is performed after intravenous contrast enhancement. The images are acquired in axial plane and subsequent reconstruction in coronal and sagittal planes. There is no previous study for comparison. The lung bases included in the study appears unremarkable except for minimal scarring and atelectatic changes. Calcified granulomas in the left lower lobe posteriorly. The limited visualized cardiomediastinal structures are unremarkable. A small sliding-type hiatal hernia seen. There is mild intrahepatic biliary dilatation. A Small low-density nodule is seen in the segment 3 of the liver which probably represent a cyst. This is too small to be further characterized in this study. The spleen is unremarkable. The gallbladder is surgically absent. There is moderate dilatation of common bile duct which is probably due to cholecystectomy. The pancreas appear normal. The distal pancreatic duct is visualized and appears normal. The adrenal glands bilaterally are normal. The kidneys bilaterally are normal. No calculi. No hydronephrosis. The limited visualized ureters bilaterally appear normal. The urinary bladder is moderately distended. No intrinsic abnormality. The uterus is absent. No adnexal masses. The stomach is normal. A duodenal diverticulum is seen projecting adjacent and above the common duct insertion. The remaining duodenum and small bowel is unremarkable. The appendix is not visualized. No finding to suggest appendicitis.  Small amount of gas, fluid and fecal 4/27/2021  No dictation     Assessment//Plan           Hospital Problems           Last Modified POA    * (Principal) GIB (gastrointestinal bleeding) 4/27/2021 Yes    Warfarin anticoagulation 4/27/2021 Yes    Factor V Leiden mutation ([facility] 4/27/2021 Yes    Overview Signed 4/27/2021  9:15 AM by Marilyn Dumont MD     Formatting of this note might be different from the original.  - Unknown if hetero or homozygous  - PE 1990s  - DVT and multiple superficial clots         Hypertension 4/27/2021 Yes    Overview Signed 4/27/2021  9:15 AM by Marilyn Dumont MD     Formatting of this note might be different from the original.  : [  ]         Lupus anticoagulant disorder ([facility] 4/27/2021 Yes    Overview Signed 4/27/2021  9:15 AM by Marilyn Dumont MD     Formatting of this note might be different from the original.  Probable per hematology records. Thrombophilia.          Protein S deficiency ([facility] 4/27/2021 Yes    Gastroesophageal reflux disease 4/27/2021 Yes    GI bleed 4/28/2021     Overview Signed 4/28/2021 10:18 AM by Carline Aguilera MD     Added automatically from request for surgery [id number]                Gastrointestinal bleeding  -Patient is status post EGDevidence of antral ulcer probable origin of GI bleed  -Patient status post 2 units FFP, 1 unit packed red blood cells  -Additional unit of packed red blood cells ordered this a.m. due to hemoglobin less than 7  -Continues on Protonix   -Continue to trend H/H every 6 hours   -GI been consulted, appreciate recommendations        History of factor V Leiden/lupus anticoagulant/protein S deficiency  -Currently holding anticoagulation in the setting of GI bleed        Moderately displaced fracture of the right nasal bonenose laceration sutured from the emergency room, ENT has been consulted for any further recommendations        Hypokalemianoted to be 3.2, magnesium within normal limits, continue with sliding scale supplementation        Gastroesophageal reflux

## 2021-04-28 NOTE — PROGRESS NOTES
Increasing weakness,no angina or chest palpitations, lungs clear, no gallop, abdomen soft, melena suggesting UGI source of major threatening GI Bleed requiring serial transfusions, inadequate correction of prolonged PT/INR with FFP, Emergent EGD needed therefore ordered Kcentra.

## 2021-04-28 NOTE — ANESTHESIA POSTPROCEDURE EVALUATION
Department of Anesthesiology  Postprocedure Note    Patient: Reena Viveros  MRN: [id number]  YOB: 1955  Date of evaluation: 4/28/2021  Time:  11:50 AM     Procedure Summary     Date: 04/28/21 Room / Location: [address]    Anesthesia Start: 1128 Anesthesia Stop:     Procedure: EGD DIAGNOSTIC ONLY (N/A ) Diagnosis:       GI bleed      (GI Bleed)    Surgeons: Libby Izaguirre MD Responsible Provider: IMELDA Lopez CRNA    Anesthesia Type: MAC ASA Status: 3          Anesthesia Type: No value filed. Naveed Phase I:      Naveed Phase II:      Last vitals: Reviewed and per EMR flowsheets.        Anesthesia Post Evaluation    Patient location during evaluation: bedside  Patient participation: complete - patient participated  Level of consciousness: awake  Pain score: 0  Airway patency: patent  Nausea & Vomiting: no nausea and no vomiting  Complications: no  Cardiovascular status: hemodynamically stable  Respiratory status: acceptable, nasal cannula and spontaneous ventilation  Hydration status: euvolemic

## 2021-04-28 NOTE — PROGRESS NOTES
Physician Progress Note      PATIENT:               Pam Chester  CSN #:                  [id number]  :                       1955  ADMIT DATE:       2021 12:59 AM  DISCH DATE:  RESPONDING  PROVIDER #:        Daniel Estrella MD          QUERY TEXT:    Pt admitted with GI bleed. Pt noted to have anemia requiring PRBC transfusion   PRBC 1 unit and FFP. If possible, please document in the progress notes and   discharge summary if you are evaluating and/or treating any of the following: The medical record reflects the following:  Risk Factors: GI bleed with antral ulcer, coumadin therapy  Clinical Indicators: HGB 9.2 7.1 6.6 7.2 6.9  Treatment: Transfusion of PRBC 1 unit and FFP 2 units. GI consult, EGD,   Kcentra, Hold warfarin, serial H&H, Protonix @ 8 mg/hr IV. Options provided:  -- Acute blood loss anemia  -- Chronic blood loss anemia  -- Acute on chronic blood loss anemia  -- Other - I will add my own diagnosis  -- Disagree - Not applicable / Not valid  -- Disagree - Clinically unable to determine / Unknown  -- Refer to Clinical Documentation Reviewer    PROVIDER RESPONSE TEXT:    This patient has acute blood loss anemia.     Query created by: Spencer Olivares on 2021 3:52 PM      Electronically signed by:  Daniel Estrella MD 2021 4:08 PM

## 2021-04-28 NOTE — CONSULTS
[facility] [address], [facility]                                  CONSULTATION    PATIENT NAME: Cleo Olsen                   :        1955  MED REC NO:   [id number]                              ROOM:       [facility]  ACCOUNT NO:   [de-identified]                           ADMIT DATE: 2021  PROVIDER:     Mac Garcia MD    CONSULT DATE:  2021    ASSESSMENT:  1.  Major GI bleed manifested by melena and fall in hemoglobin to 7.2 gm  while on Coumadin therapy. 2.  History of prior ulcer disease and esophagitis 4 to 5 years ago,  currently not on maintenance acid suppression therapy. 3.  History of DVT and pulmonary embolism more than 20 years ago due to  factor V Leiden mutation, lupus anticoagulant, protein S deficiency. 4.  History of colon polyp. RECOMMENDATIONS:  1. Protonix IV. 2.  Reversal of Coumadin therapy with transfusion of fresh frozen plasma  but consider Kcentra if the patient continues to actively bleed  requiring transfusions of packed cells. 3.  Anticipate EGD tomorrow after correction of pro-time and INR. 4.  The patient has a history of prior colon polyp on colonoscopy 5  years ago, but colonoscopy is currently deferred if EGD identifies the  source of bleeding. 5.  Consider long-term management of the risk of thromboemboli due to  DVT with a vena cava filter. HISTORY OF PRESENT ILLNESS:  This pleasant 78-year-old white female  recalls a pertinent history of DVT and pulmonary emboli 20 years ago,  living in [place], for which she was hospitalized and then discovered  eventually to have factor V Leiden mutation and has been maintained on  Coumadin since that time. Approximately 4 years ago, there was a GI  history of the patient having serial EGDs by Dr. Vaughn Stearns for peptic ulcer  disease which was present apparently for 2 years before it finally  healed.   The patient was also told she had gastroesophageal reflux  disease and had required an esophageal dilatation at one point to  facilitate her swallowing. The patient has developed symptomatic anemia, which resulted in  progressive weakness, lightheadedness and then syncopal episode with  loss of consciousness. She fell and suffered a laceration of her distal  nose and fracture of nasal bone. Clinically, she has been having  passage of black stools highly consistent with melena since this past  Saturday. She has not been taking any iron or Pepto-Bismol. She has  not been aware of any ulcer-like abdominal pain or annoying acid  stomach. She has not taken any NSAIDs or aspirin-containing products  such as Goody's headache powders. She currently has a very mild nausea. There is no dysphagia or odynophagia with no history of chronic liver  disease or alcohol abuse. Initial labs showed hemoglobin of 9.2 gm, hematocrit 28.9% with normal  platelet count, normal serum iron and retic count of 2.03. Subsequent  blood count has fallen overnight to 7.2 gm. Coagulation panel indicates  that Coumadin therapy was therapeutic with a pro-time of 31.0 and INR  2.88 with a PTT of 32.0. A 12-lead EKG does not show any acute ischemia  but nonspecific ST-T abnormality. CT of the abdomen done on admission  does not show any definite pathology. Radiologist comments about some  distal rectal wall thickening possibly. The patient denies any rectal  pain or history of hemorrhoid symptoms. PAST MEDICAL HISTORY:  Hypertension, but no known coronary artery  disease, diabetes or kidney disorder. Factor V Leiden mutation as  discussed above. SURGICAL HISTORY:  Hysterectomy, bladder suspension, rectocele repair in  2013, thyroidectomy in 2013. CURRENT MAINTENANCE MEDICATIONS:  See admission medication  reconciliation list.    SOCIAL HISTORY:  No habits of alcohol or tobacco abuse.     ALLERGIES TO MEDICATIONS:  Reviewed in the electronic medical record, which includes sulfa drugs. FAMILY HISTORY:  Mother had breast cancer. No history of GI  malignancies. PHYSICAL EXAMINATION:  GENERAL:  The patient appears pale, but alert and in no acute distress. She denies any significant abdominal pain. VITAL SIGNS:  Remained stable with no low normal range of blood  pressure. HEENT:  Facial trauma has already been addressed in the emergency room. Sclerae white. Conjunctivae pale. Buccal mucosa moist.  Midline  protrusion of tongue. Symmetrical smile. NECK:  Full range of motion of the neck. No carotid bruits. LUNGS:  Clear. No chest dullness. CARDIOVASCULAR:  No S3 or murmur. ABDOMEN:  Mild epigastric tenderness. Normal bowel sounds. No gross  organomegaly. Black stool, heme-positive. EXTREMITIES:  No rubor of the toes. NEUROLOGIC:  Grossly intact. The procedures of EGD and colonoscopy have been discussed with the  patient including indication, alternatives and risks. Will need  temporary reversal of Coumadin effect to safely pursue the endoscopy.         Nadiya Cedillo MD    D: 04/27/2021 19:31:25      T: 04/27/2021 19:38:23     [email or web address]  Job#: [id number]     Doc#: [id number]    CC:

## 2021-04-28 NOTE — ANESTHESIA PRE PROCEDURE
 sodium chloride flush 0.9 % injection 5-40 mL  5-40 mL Intravenous 2 times per day Roya Syed MD        sodium chloride flush 0.9 % injection 5-40 mL  5-40 mL Intravenous PRN Roya Syed MD        0.9 % sodium chloride infusion  25 mL Intravenous PRN Roya Syed MD        acetaminophen (TYLENOL) tablet 650 mg  650 mg Oral Q6H PRN Roya Syed MD        Or    acetaminophen (TYLENOL) suppository 650 mg  650 mg Rectal Q6H PRN Roya Syed MD        ondansetron (ZOFRAN-ODT) disintegrating tablet 4 mg  4 mg Oral Q8H PRN Roya Syed MD        Or    [facility]ron [facility]) injection 4 mg  4 mg Intravenous Q6H PRN Roya Syed MD        pantoprazole (PROTONIX) 80 mg in sodium chloride 0.9 % 100 mL infusion  8 mg/hr Intravenous Continuous Roya Syed MD 10 mL/hr at 04/27/21 0437 8 mg/hr at 04/27/21 0437    Vitamin D (CHOLECALCIFEROL) tablet 2,000 Units  2,000 Units Oral Daily Roya Syed MD   Stopped at 04/28/21 0849    FLUoxetine (PROZAC) capsule 20 mg  20 mg Oral Daily Roya Syed MD   Stopped at 04/28/21 0848    pregabalin (LYRICA) capsule 50 mg  50 mg Oral TID Roya Syed MD   Stopped at 04/28/21 0848    triamterene-hydroCHLOROthiazide (MAXZIDE-25) 37.5-25 MG per tablet 1 tablet  1 tablet Oral Daily Roya Syed MD   Stopped at 04/28/21 0849    [START ON 4/30/2021] pantoprazole (PROTONIX) injection 40 mg  40 mg Intravenous BID Roya Syed MD        And   [facility] [START ON 4/30/2021] sodium chloride (PF) 0.9 % injection 10 mL  10 mL Intravenous BID Roya Syed MD        0.9 % sodium chloride infusion   Intravenous PRN Randy Goodpasture, MD        Calamine 8-8 % lotion   Topical PRN Roya Syed MD   Given at 04/27/21 2335       Allergies:     Allergies   Allergen Reactions    Bacitracin-Polymyxin B Hives, Itching and Rash    Neomycin-Bacitracin-Polymyxin [Bacitracin-Neomycin-Polymyxin] Hives, Itching and Rash    Sulfa Antibiotics Hives, Itching and Rash       Problem List:    Patient Active Problem List   Diagnosis Code    GIB (gastrointestinal bleeding) K92.2    Warfarin anticoagulation Z79.01    Factor V Leiden mutation ([facility] D68.51    Hypertension I10    Lupus anticoagulant disorder ([facility] D68.62    Protein S deficiency ([facility] D68.59    Gastroesophageal reflux disease K21.9    GI bleed K92.2       Past Medical History:        Diagnosis Date    Depression     Factor 5 Leiden mutation, heterozygous ([facility]     Pulmonary embolism ([facility]     Thyroid disease     Warfarin anticoagulation        Past Surgical History:        Procedure Laterality Date    BLADDER SURGERY      BLADDER SUSPENSION      2013, done with hysterectomy    HYSTERECTOMY, VAGINAL  2013    done while bladder surgery being done    RECTOCELE REPAIR      2013 with hysterectomy    THYROIDECTOMY      2013       Social History:    Social History     Tobacco Use    Smoking status: Never Smoker    Smokeless tobacco: Never Used   Substance Use Topics    Alcohol use: Not Currently                                Counseling given: Not Answered      Vital Signs (Current):   Vitals:    04/28/21 0401 04/28/21 0524 04/28/21 0527 04/28/21 1030   BP:  105/67 105/67 (!) 96/54   Pulse:  78 78 93   Resp:  18 18 18   Temp:  98.2 °F (36.8 °C) 98.2 °F (36.8 °C) 98 °F (36.7 °C)   TempSrc:  Temporal Temporal Temporal   SpO2:  96% 96% 100%   Weight: 174 lb 1.6 oz (79 kg)      Height:                                                  BP Readings from Last 3 Encounters:   04/28/21 (!) 96/54       NPO Status:                                                                                 BMI:   Wt Readings from Last 3 Encounters:   04/28/21 174 lb 1.6 oz (79 kg)     Body mass index is 32.9 kg/m².     CBC:   Lab Results   Component Value Date    WBC 5.7 04/28/2021    RBC 2.46 04/28/2021    HGB 6.9 04/28/2021    HCT 21.0 04/28/2021    MCV 90.2 04/28/2021    RDW 15.1 04/28/2021     04/28/2021       CMP:   Lab Results mellitus, hyperthyroidism               Abdominal:           Vascular:                                        Anesthesia Plan      MAC     ASA 3       Induction: intravenous. MIPS: Postoperative opioids intended and Prophylactic antiemetics administered. Anesthetic plan and risks discussed with patient. Use of blood products discussed with patient whom consented to blood products.                    Livia Cabrera MD   4/28/2021

## 2021-04-28 NOTE — PROGRESS NOTES
Patient receiving 2nd unit of FFP. Patient started to complain of itching all over and developed redness of her face, neck and chest. Infusion stopped and NS started, transfusion reaction protocol intiated. Vital signs were stable, patient denied any shortness of breath. Dr. Tanya Bates and Dr. Rebecca Soto notified. They were also made aware of critical H/H values; 6.6/20. New orders and meds given. Will continue to monitor.      Electronically signed by Shreyas Mcduffie RN on 4/27/2021 at 11:53 PM

## 2021-04-29 ENCOUNTER — APPOINTMENT (OUTPATIENT)
Dept: INTERVENTIONAL RADIOLOGY/VASCULAR | Age: 66
DRG: 378 | End: 2021-04-29
Payer: MEDICARE

## 2021-04-29 PROBLEM — I10 ESSENTIAL HYPERTENSION: Chronic | Status: ACTIVE | Noted: 2021-04-27

## 2021-04-29 PROBLEM — K21.01 GASTROESOPHAGEAL REFLUX DISEASE WITH ESOPHAGITIS AND HEMORRHAGE: Chronic | Status: ACTIVE | Noted: 2021-04-27

## 2021-04-29 PROBLEM — D62 ACUTE BLOOD LOSS ANEMIA: Status: ACTIVE | Noted: 2021-04-29

## 2021-04-29 LAB
ANION GAP SERPL CALCULATED.3IONS-SCNC: 10 MMOL/L (ref 7–19)
BUN BLDV-MCNC: 15 MG/DL (ref 8–23)
CALCIUM SERPL-MCNC: 7.1 MG/DL (ref 8.8–10.2)
CHLORIDE BLD-SCNC: 108 MMOL/L (ref 98–111)
CO2: 26 MMOL/L (ref 22–29)
CREAT SERPL-MCNC: 0.7 MG/DL (ref 0.5–0.9)
GFR AFRICAN AMERICAN: >59
GFR NON-AFRICAN AMERICAN: >60
GLUCOSE BLD-MCNC: 87 MG/DL (ref 74–109)
HCT VFR BLD CALC: 25.1 % (ref 37–47)
HCT VFR BLD CALC: 25.4 % (ref 37–47)
HCT VFR BLD CALC: 25.6 % (ref 37–47)
HEMOGLOBIN: 8.1 G/DL (ref 12–16)
HEMOGLOBIN: 8.2 G/DL (ref 12–16)
HEMOGLOBIN: 8.3 G/DL (ref 12–16)
INR BLD: 1.6 (ref 0.88–1.18)
MAGNESIUM: 1.7 MG/DL (ref 1.6–2.4)
MCH RBC QN AUTO: 28.5 PG (ref 27–31)
MCHC RBC AUTO-ENTMCNC: 32.3 G/DL (ref 33–37)
MCV RBC AUTO: 88.4 FL (ref 81–99)
PDW BLD-RTO: 15.4 % (ref 11.5–14.5)
PLATELET # BLD: 160 K/UL (ref 130–400)
PMV BLD AUTO: 9.7 FL (ref 9.4–12.3)
POTASSIUM REFLEX MAGNESIUM: 3 MMOL/L (ref 3.5–5)
PROTHROMBIN TIME: 19.2 SEC (ref 12–14.6)
RBC # BLD: 2.84 M/UL (ref 4.2–5.4)
SODIUM BLD-SCNC: 144 MMOL/L (ref 136–145)
WBC # BLD: 5 K/UL (ref 4.8–10.8)

## 2021-04-29 PROCEDURE — 6370000000 HC RX 637 (ALT 250 FOR IP): Performed by: SURGERY

## 2021-04-29 PROCEDURE — 6360000002 HC RX W HCPCS: Performed by: SURGERY

## 2021-04-29 PROCEDURE — 6360000004 HC RX CONTRAST MEDICATION: Performed by: SURGERY

## 2021-04-29 PROCEDURE — 85610 PROTHROMBIN TIME: CPT

## 2021-04-29 PROCEDURE — 1210000000 HC MED SURG R&B

## 2021-04-29 PROCEDURE — B519YZZ FLUOROSCOPY OF INFERIOR VENA CAVA USING OTHER CONTRAST: ICD-10-PCS | Performed by: SURGERY

## 2021-04-29 PROCEDURE — 83735 ASSAY OF MAGNESIUM: CPT

## 2021-04-29 PROCEDURE — 2580000003 HC RX 258: Performed by: INTERNAL MEDICINE

## 2021-04-29 PROCEDURE — 2500000003 HC RX 250 WO HCPCS: Performed by: SURGERY

## 2021-04-29 PROCEDURE — 85014 HEMATOCRIT: CPT

## 2021-04-29 PROCEDURE — C9113 INJ PANTOPRAZOLE SODIUM, VIA: HCPCS | Performed by: INTERNAL MEDICINE

## 2021-04-29 PROCEDURE — C9113 INJ PANTOPRAZOLE SODIUM, VIA: HCPCS | Performed by: SURGERY

## 2021-04-29 PROCEDURE — 36415 COLL VENOUS BLD VENIPUNCTURE: CPT

## 2021-04-29 PROCEDURE — 99221 1ST HOSP IP/OBS SF/LOW 40: CPT | Performed by: PHYSICIAN ASSISTANT

## 2021-04-29 PROCEDURE — 99222 1ST HOSP IP/OBS MODERATE 55: CPT | Performed by: NURSE PRACTITIONER

## 2021-04-29 PROCEDURE — 85018 HEMOGLOBIN: CPT

## 2021-04-29 PROCEDURE — 85027 COMPLETE CBC AUTOMATED: CPT

## 2021-04-29 PROCEDURE — 97161 PT EVAL LOW COMPLEX 20 MIN: CPT

## 2021-04-29 PROCEDURE — 6360000002 HC RX W HCPCS: Performed by: INTERNAL MEDICINE

## 2021-04-29 PROCEDURE — 37191 INS ENDOVAS VENA CAVA FILTR: CPT | Performed by: SURGERY

## 2021-04-29 PROCEDURE — 6370000000 HC RX 637 (ALT 250 FOR IP): Performed by: FAMILY MEDICINE

## 2021-04-29 PROCEDURE — 80048 BASIC METABOLIC PNL TOTAL CA: CPT

## 2021-04-29 PROCEDURE — 6370000000 HC RX 637 (ALT 250 FOR IP): Performed by: INTERNAL MEDICINE

## 2021-04-29 PROCEDURE — C1769 GUIDE WIRE: HCPCS

## 2021-04-29 PROCEDURE — 99232 SBSQ HOSP IP/OBS MODERATE 35: CPT | Performed by: INTERNAL MEDICINE

## 2021-04-29 PROCEDURE — 2580000003 HC RX 258: Performed by: SURGERY

## 2021-04-29 PROCEDURE — 06H03DZ INSERTION OF INTRALUMINAL DEVICE INTO INFERIOR VENA CAVA, PERCUTANEOUS APPROACH: ICD-10-PCS | Performed by: SURGERY

## 2021-04-29 RX ORDER — LIDOCAINE HYDROCHLORIDE 20 MG/ML
INJECTION, SOLUTION INFILTRATION; PERINEURAL
Status: COMPLETED | OUTPATIENT
Start: 2021-04-29 | End: 2021-04-29

## 2021-04-29 RX ORDER — POTASSIUM CHLORIDE 20 MEQ/1
40 TABLET, EXTENDED RELEASE ORAL 2 TIMES DAILY
Status: DISCONTINUED | OUTPATIENT
Start: 2021-04-29 | End: 2021-04-30 | Stop reason: HOSPADM

## 2021-04-29 RX ORDER — HEPARIN SODIUM 5000 [USP'U]/ML
INJECTION, SOLUTION INTRAVENOUS; SUBCUTANEOUS
Status: COMPLETED | OUTPATIENT
Start: 2021-04-29 | End: 2021-04-29

## 2021-04-29 RX ORDER — LIDOCAINE 4 G/G
1 PATCH TOPICAL DAILY
Status: DISCONTINUED | OUTPATIENT
Start: 2021-04-29 | End: 2021-04-30 | Stop reason: HOSPADM

## 2021-04-29 RX ORDER — IODIXANOL 320 MG/ML
INJECTION, SOLUTION INTRAVASCULAR
Status: COMPLETED | OUTPATIENT
Start: 2021-04-29 | End: 2021-04-29

## 2021-04-29 RX ORDER — POTASSIUM CHLORIDE 20 MEQ/1
40 TABLET, EXTENDED RELEASE ORAL ONCE
Status: DISCONTINUED | OUTPATIENT
Start: 2021-04-29 | End: 2021-04-30 | Stop reason: HOSPADM

## 2021-04-29 RX ORDER — MAGNESIUM SULFATE IN WATER 40 MG/ML
2000 INJECTION, SOLUTION INTRAVENOUS ONCE
Status: COMPLETED | OUTPATIENT
Start: 2021-04-29 | End: 2021-04-29

## 2021-04-29 RX ADMIN — PREGABALIN 50 MG: 50 CAPSULE ORAL at 20:52

## 2021-04-29 RX ADMIN — HEPARIN SODIUM 5000 UNITS: 5000 INJECTION, SOLUTION INTRAVENOUS; SUBCUTANEOUS at 12:52

## 2021-04-29 RX ADMIN — LIDOCAINE HYDROCHLORIDE 10 ML: 20 INJECTION, SOLUTION INFILTRATION; PERINEURAL at 12:53

## 2021-04-29 RX ADMIN — FLUOXETINE HYDROCHLORIDE 20 MG: 20 CAPSULE ORAL at 09:45

## 2021-04-29 RX ADMIN — SUCRALFATE 1 G: 1 TABLET ORAL at 11:31

## 2021-04-29 RX ADMIN — POTASSIUM CHLORIDE 40 MEQ: 1500 TABLET, EXTENDED RELEASE ORAL at 20:52

## 2021-04-29 RX ADMIN — SUCRALFATE 1 G: 1 TABLET ORAL at 17:41

## 2021-04-29 RX ADMIN — SUCRALFATE 1 G: 1 TABLET ORAL at 06:30

## 2021-04-29 RX ADMIN — SUCRALFATE 1 G: 1 TABLET ORAL at 00:49

## 2021-04-29 RX ADMIN — TRIAMTERENE AND HYDROCHLOROTHIAZIDE 1 TABLET: 37.5; 25 TABLET ORAL at 09:45

## 2021-04-29 RX ADMIN — POTASSIUM CHLORIDE 40 MEQ: 1500 TABLET, EXTENDED RELEASE ORAL at 11:32

## 2021-04-29 RX ADMIN — PREGABALIN 50 MG: 50 CAPSULE ORAL at 13:38

## 2021-04-29 RX ADMIN — POTASSIUM CHLORIDE 40 MEQ: 1500 TABLET, EXTENDED RELEASE ORAL at 13:39

## 2021-04-29 RX ADMIN — SODIUM CHLORIDE 8 MG/HR: 9 INJECTION, SOLUTION INTRAVENOUS at 06:30

## 2021-04-29 RX ADMIN — PREGABALIN 50 MG: 50 CAPSULE ORAL at 09:44

## 2021-04-29 RX ADMIN — IODIXANOL 25 ML: 320 INJECTION, SOLUTION INTRAVASCULAR at 13:02

## 2021-04-29 RX ADMIN — MAGNESIUM SULFATE HEPTAHYDRATE 2000 MG: 40 INJECTION, SOLUTION INTRAVENOUS at 13:38

## 2021-04-29 RX ADMIN — Medication 2000 UNITS: at 09:45

## 2021-04-29 RX ADMIN — SODIUM CHLORIDE 8 MG/HR: 9 INJECTION, SOLUTION INTRAVENOUS at 15:30

## 2021-04-29 NOTE — CONSULTS
[facility]    CONSULT    Patient:  Shayy Madera  YOB: 1955  Date of Service: 4/29/2021  MRN: [id number]   Acct: [de-identified]   Primary Care Physician: Pedro Lopez    Reason for consult: Request for IVC Filter Placement    Requesting Physician: Dr. Kaylah Luna    History Obtained From: Patient    HISTORY OF PRESENT ILLNESS:  Ms. Shayy Madera is a 77 y.o. female, with known Factor V Leiden on chronic coumadin, who presented to the ER on 04/27/2021 after suffering a syncopal episode, resulting in a laceration of the right side of her nose. Patient stated she has been having multiple large dark black stools for approximately 1 week. Complained of progressive weakness and shortness of breath. Work-up in the ER, Hgb 9.2 / Hct 28.9. CT head was negative for intracranial abnormality, but noted to have displaced fracture of nasal bones. PT 31.0 / INR 2.88. Laceration was sutured per Dr. Tony Pettit. Patient was admitted to the hospitalist service with GI consultation. She was seen by Dr. Marcos Fleming, GI, who performed EGD on 04/28/2021. She was found to have nonbleeding 8 mm distal esophageal ulcer 2' to GERD; 1.2 cm antral ulcer (origin of GI bleed), and 1 cm antral polyp. Recommendation of holding Coumadin and consider IVC filter placement. Therefore, vascular surgery has been consulted.        Past Medical History:       Diagnosis Date    Depression     Factor 5 Leiden mutation, heterozygous ([address]     Pulmonary embolism ([address]     Thyroid disease     Warfarin anticoagulation        Past Surgical History:        Procedure Laterality Date    BLADDER SURGERY      BLADDER SUSPENSION      2013, done with hysterectomy    HYSTERECTOMY, VAGINAL  2013    done while bladder surgery being done   [facility]      2013 with hysterectomy    THYROIDECTOMY      2013    UPPER GASTROINTESTINAL ENDOSCOPY N/A 4/28/2021    EGD DIAGNOSTIC ONLY performed by Ron Moraes MD at pantoprazole (PROTONIX) 80 mg in sodium chloride 0.9 % 100 mL infusion  8 mg/hr Intravenous Continuous Nuzhat Wooten MD 10 mL/hr at 2130 8 mg/hr at 21 0630    Vitamin D (CHOLECALCIFEROL) tablet 2,000 Units  2,000 Units Oral Daily Nuzhat Wooten MD   2,000 Units at 21 0945    FLUoxetine (PROZAC) capsule 20 mg  20 mg Oral Daily Nuzhat Wooten MD   20 mg at 21 0945    pregabalin (LYRICA) capsule 50 mg  50 mg Oral TID Nuzhat Wooten MD   50 mg at 21 0944    triamterene-hydroCHLOROthiazide (MAXZIDE-25) 37.5-25 MG per tablet 1 tablet  1 tablet Oral Daily Nuzhat Wooten MD   1 tablet at 21 0945    [START ON 2021] pantoprazole (PROTONIX) injection 40 mg  40 mg Intravenous BID Nuzhat Wooten MD        And   [facility] [START ON 2021] sodium chloride (PF) 0.9 % injection 10 mL  10 mL Intravenous BID Nuzhat Wooten MD        Calamine 8-8 % lotion   Topical PRN Nuzhat Wooten MD   Given at 21 9665       Social History:  Reports that she has never smoked. She has never used smokeless tobacco. She reports previous alcohol use. She reports that she does not use drugs. Family History:      Problem Relation Age of Onset    Breast Cancer Mother          at age 50 years   [facility] Heart Attack Father         when pt 1 months old  of a fatal MI - was at age 64 years    No Known Problems Son          in MVA at age 24 years    No Known Problems Son     No Known Problems Son     No Known Problems Son        REVIEW OF SYSTEMS:  General: Denies any fever or chills. Denies any unexplained weight loss or gain. Denies any change in activity or endurance. Profound weakness. HEENT: Denies any headaches or visual changes. Respiratory: Denies any cough or hoarseness. + Shortness of breath with any type of activity. Cardiac: Denies any chest pain or pressure. Denies any palpitations. Denies any presyncope or syncope.  Denies any orthopnea or PND. Denies any lower extremity edema. GI: Denies any abdominal pain. Denies any nausea or vomiting. Multiple large dark brown stools for the past week. : Denies any hematuria, frequency, hesitancy, or dysuria. Musculoskeletal: Denies any pain or swelling in her joints. Weakness. Neurological: Denies any paraesthesias. Denies any history of seizure or stroke symptoms. Dizziness. Psychological: Denies any problems with anxiety or depression. All other systems are negative except where stated above. PHYSICAL EXAM:  /60   Pulse 70   Temp 96.4 °F (35.8 °C) (Temporal)   Resp 16   Ht 5' 1\" (1.549 m)   Wt 174 lb 1.6 oz (79 kg)   SpO2 96%   BMI 32.90 kg/m²   General appearance: Demonstrates a well-developed, well-nourished female who is alert and oriented in no acute distress. HEENT: Normocephalic. Sutured laceration on right side of nose. DARCY. NECK: Supple. NO JVD. No carotids bruits auscultated. Chest: Clear to auscultation bilaterally without wheezes or rhonchi. Cardiac: Normal heart tones with regular rate and rhythm, S1, S2 normal. No murmurs, gallops, or rubs auscultated. Abdomen: Soft, non-tender; non-distended normal bowel sounds no masses, no organomegaly. Extremities: No clubbing or cyanosis. No peripheral edema. Peripheral pulses palpable. Skin: Skin color, texture, turgor normal. No rashes or lesions  Neurologic: Grossly intact. LABS AND DIAGNOSTICS:    CBC with Differential:   Lab Results   Component Value Date    WBC 5.0 04/29/2021    RBC 2.84 04/29/2021    HGB 8.3 04/29/2021    HCT 25.4 04/29/2021     04/29/2021    MCV 88.4 04/29/2021     BMP:   Lab Results   Component Value Date     04/29/2021    K 3.0 04/29/2021     04/29/2021    CO2 26 04/29/2021    BUN 15 04/29/2021    CREATININE 0.7 04/29/2021    CALCIUM 7.1 04/29/2021    GFRAA >59 04/29/2021    LABGLOM >60 04/29/2021    GLUCOSE 87 04/29/2021         ASSESSMENT:    1.  Syncope with Collapse 2' to GI Bleed  2. Acute Blood Anemia 2' to GI Bleed  3. GI Bleed 2' to 1.2 cm Antral Ulcer  4. Hypercoagulable State 2' to Coumadin with INR 2.88  5. Factor V Leiden Mutation  6. Essential HTN       PLAN:    1. Will make patient NPO for IVC Filter. Procedure explained to patient and her spouse, including risks and benefits. She is agreeable to proceed. 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 She has significant GI bleeding with distal esophageal and stomach ulcers. She is going have to be off her Coumadin for some time. I examined the patient preoperatively. I discussed the procedure, risks, benefits, and alternatives. She seems to understand agrees to proceed. Brenden Rizo MD

## 2021-04-29 NOTE — PLAN OF CARE
Post IVC Filter Placement    Patient in room.  at bedside. Right IJ puncture site with dressing dry and intact. No evidence of bleeding or swelling. Vascular Surgery will sign off. F/U in office to discuss IVC Filter removal when appropriate.        Jarek Castaneda, APRN-BC

## 2021-04-29 NOTE — PROGRESS NOTES
Progress Note  Date:2021       Room:0504/504-01  Patient Name:Julieta Sams     YOB: 1955     Age:65 y.o. Subjective    Subjective   Patient seen and examined, status post EGD completed . Resting comfortably in bed. Reviewed findings of ulcerations, as well as need for discontinuation of Coumadin due to high risk. Patient evaluated by vascular team plans for IVC filter placement. Denies any headache, chest pain, shortness of breath, fevers or chills. Cumulative hospital course: Patient was admitted 327, 43-year-old  female past medical history of hypertension, factor V Leiden mutation, lupus anticoagulant/protein S deficiency anticoagulated with Coumadin, depression/anxiety. Admitted due to GI bleed, evidence of syncopal event in which patient suffered nasal fracture laceration was sutured in the emergency room. Note of INR elevation and with prior history patient dosed with FFP, received unit packed red blood cells. GI was consulted maintained on Protonix. Underwent EGD  -evidence of antral ulcer which was probable origin of GI bleed currently stopped. Coumadin therapy discontinued due to high risk of recurrent bleed, vascular surgery consulted for plans for IVC filter. Awaiting ENT evaluation. Review of Systems   ROS: 14 point review of systems is negative except as specifically addressed above. Objective         Vitals Last 24 Hours:  TEMPERATURE:  Temp  Av.4 °F (36.3 °C)  Min: 96.4 °F (35.8 °C)  Max: 98.7 °F (37.1 °C)  RESPIRATIONS RANGE: Resp  Av.6  Min: 10  Max: 23  PULSE OXIMETRY RANGE: SpO2  Av.6 %  Min: 85 %  Max: 100 %  PULSE RANGE: Pulse  Av.3  Min: 69  Max: 95  BLOOD PRESSURE RANGE: Systolic (98UMZ), WWM:927 , Min:91 , CNY:181   ; Diastolic (46NJB), KVJ:41, Min:55, Max:68    I/O (24Hr):     Intake/Output Summary (Last 24 hours) at 2021 1126  Last data filed at 2021 0956  Gross per 24 hour   Intake 672 ml 50       Imaging Last 24 Hours:  Ct Head Wo Contrast    Result Date: 4/27/2021  Examination. CT HEAD WO CONTRAST 4/27/2021 1:27 AM History: The patient fell. The patient is on Coumadin. DLP: 824 mGycm. The CT scan of the head is performed without intravenous contrast enhancement. The images are acquired in axial plane and subsequent reconstruction in coronal and sagittal planes. There is no previous study for comparison. There is no evidence of an intracranial hemorrhage or hematoma. There is no evidence of mass or midline shift. The ventricles, the basal cisterns and cortical sulci are unremarkable for the age. A few areas of chronic white matter ischemia are seen in the centrum semiovale bilaterally. The gray-white matter differentiation is maintained. A partially empty sella turcica is seen. The images reviewed in bone window show displaced fractures of the nasal bones and probable displaced fracture of the anterior nasal spine which may be further evaluated with CT scan of the facial bones. No skull fracture. The visualized paranasal sinuses and mastoid air cells are unremarkable. The orbits appear unremarkable. No acute intracranial abnormality. The displaced fracture of the nasal bones. Above study was initially reviewed and reported by [facility]. I do not find any discrepancies. Signed by Dr Jamil Craig on 4/27/2021 6:29 AM    Ct Facial Bones Wo Contrast    Result Date: 4/27/2021  Examination. CT FACIAL BONES WO CONTRAST 4/27/2021 1:27 AM History: The patient fell and have a swelling and laceration of the nose. DLP: 267 mGycm. The CT scan of the facial bones is performed without intravenous contrast enhancement. The images are acquired in axial plane and subsequent reconstruction in coronal and sagittal planes. There is no previous study for comparison. There is a moderately depressed fracture of the right nasal bone with a mild overlapping of the bony fragments.  There is adjacent soft tissue swelling and laceration/soft tissue air. The fracture does not appear to involve the nasal septum. There is a nondisplaced fracture of the anterior nasal spine. The maxilla, the mandible, the orbits and zygomatic arches are normal.    A moderately displaced fracture of the right nasal bone with a mild overlapping of the bony fragments. A nondisplaced fracture of the anterior nasal spine. The above study was initially reviewed and reported by [facility]. I do not find any discrepancies. Signed by Dr Leslie Razo on 4/27/2021 6:49 AM    Ct Abdomen Pelvis W Iv Contrast Additional Contrast? None    Result Date: 4/27/2021  Examination. CT ABDOMEN PELVIS W IV CONTRAST 4/27/2021 1:27 AM History: Abdominal pain and rectal bleeding. DLP: 1493 mGycm. The CT scan of the abdomen and pelvis is performed after intravenous contrast enhancement. The images are acquired in axial plane and subsequent reconstruction in coronal and sagittal planes. There is no previous study for comparison. The lung bases included in the study appears unremarkable except for minimal scarring and atelectatic changes. Calcified granulomas in the left lower lobe posteriorly. The limited visualized cardiomediastinal structures are unremarkable. A small sliding-type hiatal hernia seen. There is mild intrahepatic biliary dilatation. A Small low-density nodule is seen in the segment 3 of the liver which probably represent a cyst. This is too small to be further characterized in this study. The spleen is unremarkable. The gallbladder is surgically absent. There is moderate dilatation of common bile duct which is probably due to cholecystectomy. The pancreas appear normal. The distal pancreatic duct is visualized and appears normal. The adrenal glands bilaterally are normal. The kidneys bilaterally are normal. No calculi. No hydronephrosis. The limited visualized ureters bilaterally appear normal. The urinary bladder is moderately distended. No intrinsic abnormality. The uterus is absent. No adnexal masses. The stomach is normal. A duodenal diverticulum is seen projecting adjacent and above the common duct insertion. The remaining duodenum and small bowel is unremarkable. The appendix is not visualized. No finding to suggest appendicitis. Small amount of gas, fluid and fecal material is seen in the colon. There is moderate thickening of the limited visualized distal rectum and anorectal junction which is suboptimally evaluated in this study. There is diverticulosis of the distal colon. No finding to suggest diverticulitis. Atheromatous changes of the abdominal aorta and iliac arteries. No aneurysmal dilatation. There are a few scattered particulate prominent mesenteric nodes which is nonspecific finding. Clinical significance is not certain. The images reviewed in bone window show chronic degenerative changes of the thoracolumbar spine and a mild scoliosis. No focal bony lesion is noted. An incompletely visualized distal rectum and anorectal junction with suggestion of moderate thickening of the walls? . The possibility of inflammatory or neoplastic process in this area may not be excluded. This may be clinically correlated and further evaluated. The remaining abdomen and pelvis is unremarkable as detailed above. The above study was initially reviewed and reported by [facility]. The noncritical discrepancy mentioned above report with ER by a priority report. Signed by Dr Viktoriya Luong on 4/27/2021 7:23 AM    Xr Chest Portable    Result Date: 4/27/2021  Examination. XR CHEST PORTABLE 4/27/2021 12:08 AM History: Syncope. A single frontal portable upright view of the chest is compared with the previous study dated 10/9/2012. The lungs are poorly expanded. A few granulomas are seen in the lungs bilaterally, particularly in the left hilum and infrahilar area. There is no pleural effusion, pulmonary congestion or pneumothorax. There is no recent infiltrate.  The heart size in the normal range. There is no acute bony abnormality. There is a mild dextroscoliosis of the thoracic spine. No active cardiopulmonary disease. Signed by Dr Enoch Izaguirre on 4/27/2021 7:39 AM    Egd    Result Date: 4/28/2021  No dictation     Egd    Result Date: 4/28/2021  No dictation     Egd    Result Date: 4/27/2021  No dictation     Assessment//Plan           Hospital Problems           Last Modified POA    * (Principal) GI bleed 4/29/2021 Yes    Overview Signed 4/28/2021 10:18 AM by Tasneem Parkinson MD     Added automatically from request for surgery [id number]         Warfarin anticoagulation 4/29/2021 Yes    Factor V Leiden mutation ([facility] 4/29/2021 Yes    Overview Signed 4/27/2021  9:15 AM by Zia Rodas MD     Formatting of this note might be different from the original.  - Unknown if hetero or homozygous  - PE 1990s  - DVT and multiple superficial clots         Essential hypertension (Chronic) 4/29/2021 Yes    Overview Signed 4/27/2021  9:15 AM by Zia Rodas MD     Formatting of this note might be different from the original.  : [  ]         Lupus anticoagulant disorder ([facility] 4/29/2021 Yes    Overview Signed 4/27/2021  9:15 AM by Zia Rodas MD     Formatting of this note might be different from the original.  Probable per hematology records. Thrombophilia.          Protein S deficiency ([facility] 4/29/2021 Yes    Gastroesophageal reflux disease with esophagitis and hemorrhage (Chronic) 4/29/2021 Yes    Acute blood loss anemia 4/29/2021 Yes                 Gastrointestinal bleeding  -Patient is status post EGDfindings of 8 mm distal esophageal ulcer secondary to gastroesophageal reflux disease, 1.2 cm antral ulceration, evidence of antral ulcer probable origin of GI bleed, 1 cm inflammatory antral polyp.  -Patient status post 2 units FFP, 1 unit packed red blood cells  -Additional unit of packed red blood cells ordered this a.m. due to hemoglobin less than 7  -Continues on Protonix   -Continue to trend H/H every 6 hours   -GI been consulted, appreciate recommendations  -Vascular surgery consultedplans for IVC filter placement  -Home health order has been placed and arranged prior to patient's DC        History of factor V Leiden/lupus anticoagulant/protein S deficiency  -Recommendation for cessation of Coumadin therapy, vascular surgery consulted for IVC filter placement        Moderately displaced fracture of the right nasal bonenose laceration sutured from the emergency room, ENT has been consulted for any further recommendations        Hypokalemianoted to be 3.0, magnesium within normal limits, continue with sliding scale supplementation        Gastroesophageal reflux diseasechronic condition prior history of ulcer disease, continue with Protonix        Depression/anxietychronic condition, resume SSRI therapy     Essential hypertensionchronic condition, continue antihypertensive regimen, routine vitals        DVT prophylaxisCoumadin currently held (to be discontinued indefinitely, plans for IVC filter placement)  -Place SCDs           EMR Dragon/Transcription disclaimer:   Much of this encounter note is an electronic transcription/translation of spoken language to printed text.  The electronic translation of spoken language may permit erroneous, or at times, nonsensical words or phrases to be inadvertently transcribed; although attempts have made to review the note for such errors, some may still exist.    Electronically signed by   [facility]   Internal Medicine Hospitalist  On 4/29/2021  At 11:26 AM

## 2021-04-29 NOTE — PROGRESS NOTES
No further GI complaints, non-tender epigastrium, Hgb stable treating gastric ulcer, increase risk of re-bleed form PUD, agree with IVC filter, no further GI work-up, follow-up in GI clinic in 1 month.

## 2021-04-29 NOTE — PROGRESS NOTES
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 Pt states it has been  20 years since having either a PE or a DVT since she has been on coumadin for the past 20 yrs.     Electronically signed by Yaw Tijerina RN on 4/29/2021 at 8:32 AM

## 2021-04-29 NOTE — CARE COORDINATION
Spoke with patient regarding MD orders for [facility] services. Pt agreeable and chose [facility]. Spoke with Roxanna Gallego. Referral accepted and faxed. Please notify [facility] when patient discharges and Fax DC Summary,  DC med list and any new [facility] orders. P. [phone]  F. [phone]  The Patient was provided with a choice of provider and agrees   with the discharge plan. [x] Yes [] No    Freedom of choice list was provided with basic dialogue that supports the patient's individualized plan of care/goals, treatment preferences and shares the quality data associated with the providers.  [x] Yes [] No  Electronically signed by Ezio Jovel on 4/29/21 at 10:30 AM CDT

## 2021-04-29 NOTE — OP NOTE
[facility] [employer] [facility] [address]ENTE [address], [address] [facility]                                OPERATIVE REPORT    PATIENT NAME: Victorino Soto                   :        1955  MED REC NO:   [id number]                              ROOM:       [facility]  ACCOUNT NO:   [de-identified]                           ADMIT DATE: 2021  PROVIDER:     Miguel Coleman MD    DATE OF PROCEDURE:  2021    PROCEDURE PERFORMED:  EGD with photos and biopsy. INDICATION:  A 20-year-old white female, on long-term Coumadin therapy  for factor V Leiden mutation, has experienced a major GI bleed with fall  in hemoglobin to 6.6 gm requiring transfusion of 3 units of packed  cells. Coumadin therapy reversal was done with Kcentra. She is now  undergoing EGD to evaluate for etiology of GI blood loss and perform  endoscopic intervention as needed to help further manage her bleed. PREMEDICATION:  Propofol per anesthetist.    PROCEDURE:  The head of the bed was elevated. The Olympus video  endoscope was passed by mouth. The distal esophagus showed an 8 mm  superficial mucosal ulcer which had surrounding mild inflammation but no  bleeding. There is mild irregularity of the squamocolumnar line at 37  cm from the incisor teeth, but no tumor or Jesus's metaplasia. The  stomach was insufflated with air. There was a small amount of blood in  the antrum. Lavage was done to identify a 1.2 cm antral ulcer with 4 mm  depth of penetration and a visible arterial vessel which was no longer  bleeding after receiving Kcentra. There is no associated tumor. There  is an incidental finding of a 1 cm inflammatory antral polyp observed to  prolapse back and forth from the stomach through the pylorus into the  duodenum. This polyp had a benign appearance. The duodenal bulb had no  inflammation or ulcer.   The second portion of the duodenum was normal  with the scope past the full length of the
vein and deployed in the inferior vena cava with the tip of the filter at the level of the lowest renal vein. A completion inferior vena cava venogram confirmed that the filter was in good position. There was no significant tilt nor migration. Following this, the sheath was removed, pressure was held for hemostasis, and a sterile dressing was applied. The patient was taken back to their room in good condition. This is a retrievable filter. This was discussed with the patient and/or family. Once the filter is no longer needed, inferior vena cava filter retrieval should be considered. I would be happy to perform filter retrieval if/when indicated.

## 2021-04-29 NOTE — PROGRESS NOTES
Physical Therapy    Facility/Department: [facility] SURG SERVICES  Initial Assessment    NAME: Mario Infante  : 1955  MRN: [id number]    Date of Service: 2021    Discharge Recommendations:  Home with assist PRN   PT Equipment Recommendations  Equipment Needed: No    Assessment   Assessment: pt DOES NOT DEMONSTRATE NEED FOR SKILLED PT INTERVENTION AT THIS TIME. CAN AMB WITH NURSING STAFF OR  AS ALLOWED BY NURSING PROTOCOL  Decision Making: Low Complexity  PT Education: General Safety  REQUIRES PT FOLLOW UP: Yes  Activity Tolerance  Activity Tolerance: Patient Tolerated treatment well       Patient Diagnosis(es): The primary encounter diagnosis was Gastrointestinal hemorrhage, unspecified gastrointestinal hemorrhage type. Diagnoses of Syncope and collapse, Closed head injury, initial encounter, Laceration of nose, initial encounter, Hypokalemia, GI bleed, and GI bleed were also pertinent to this visit. has a past medical history of Depression, Factor 5 Leiden mutation, heterozygous ([facility] [address] Pulmonary embolism ([facility] [address] Thyroid disease, and Warfarin anticoagulation. has a past surgical history that includes Thyroidectomy; Hysterectomy, vaginal (); Bladder surgery; Rectocele repair; bladder suspension; and Upper gastrointestinal endoscopy (N/A, 2021).     Restrictions     Vision/Hearing        Subjective  General  Diagnosis: GI BLEED  Follows Commands: Within Functional Limits  Subjective  Subjective: pt STATES SHE IS NOW HAVING NO SYMPTOMS OF SYNCOPE OR PRE SYNCOPE BUT DOES FEEL GENERALLY AND WOULD LIKE TO AMB IN HALLWAY TO GAIN STRENGTH  Pain Screening  Patient Currently in Pain: Denies  Vital Signs  Patient Currently in Pain: Denies       Orientation  Orientation  Overall Orientation Status: Within Functional Limits  Social/Functional History  Social/Functional History  Lives With: Spouse  ADL Assistance: Independent  Ambulation Assistance: Independent  Transfer Assistance: Independent Cognition        Objective          AROM RLE (degrees)  RLE AROM: WFL  AROM LLE (degrees)  LLE AROM : WFL  Strength RLE  Strength RLE: WFL  Strength LLE  Strength LLE: WFL        Bed mobility  Supine to Sit: Independent  Transfers  Sit to Stand: Supervision  Stand to sit: Supervision  Bed to Chair: Stand by assistance  Ambulation  Ambulation?: Yes  Ambulation 1  Device: No Device  Assistance: Stand by assistance  Quality of Gait: SLOW AND MILDLY GUARDED BUT NO LOB OR PATH DEVIATION  Comments: CAN CONTINUE TO AMB WITH NURSING STAFF              Plan   Plan  Times per week: EVAL ONLY  Safety Devices  Type of devices: Call light within reach, Left in chair      Goals  Short term goals  Time Frame for Short term goals: EVAL ONLY       Therapy Time   Individual Concurrent Group Co-treatment   Time In           Time Out           Minutes                   Gary Kolb PT    Electronically signed by Gary Kolb PT on 4/29/2021 at 11:55 AM

## 2021-04-29 NOTE — CONSULTS
[facility] OTOLARYNGOLOGY/ENT    Mrs. Rhonda Thayer is a pleasant 78 y/o, w/f that was admitted Monday night after a syncopal episode resulting in the pt. landing on her bathroom sink. She admitted to feeling very weak for about 2-3 days prior to the fall. She admitted to having diarrhea twice a day for a week prior to the fall. She was brought to the ER for evaluation and was found with a profound anemia with facial CT + for a nasal fracture. This revealed a moderately displaced fx. of the right nasal bone and a non-displaced anterior nasal spine fx. She was noted with a laceration of the right external nare that was sutured in the ER. CT of the abd/pelvis demonstrated thickening of the rectum with no obvious masses noted. Pt. was seen by GI and underwent an EGD which revealed a recent bleeding antral ulcer. Currently she is noted with tolerable discomfort from the nose with no active bleeding issues. PMH significant for Factor V Leiden and requires anticoagulation.      Allergies: Bacitracin-polymyxin b, Neomycin-bacitracin-polymyxin [bacitracin-neomycin-polymyxin], and Sulfa antibiotics      Current Facility-Administered Medications   Medication Dose Route Frequency Provider Last Rate Last Admin    potassium chloride (KLOR-CON M) extended release tablet 40 mEq  40 mEq Oral Once Vadim Herman MD        potassium chloride (KLOR-CON M) extended release tablet 40 mEq  40 mEq Oral BID Vadim Herman MD   40 mEq at 04/29/21 1339    vancomycin (VANCOCIN) 1000 mg in dextrose 5% 250 mL IVPB  1,000 mg Intravenous On Call to 1970 Lamine Soliman MD        lidocaine 4 % external patch 1 patch  1 patch Transdermal Daily Vadim Herman MD   1 patch at 04/29/21 1337    sodium chloride (OCEAN, BABY AYR) 0.65 % nasal spray 2 spray  2 spray Nasal Q2H PRN Epi Hicks PA-C        potassium chloride (KLOR-CON M) extended release tablet 40 mEq  40 mEq Oral PRN Vadim Herman MD        Or    potassium bicarb-citric acid (EFFER-K) effervescent tablet 40 mEq  40 mEq Oral PRN Peter Wen MD        Or    potassium chloride 10 mEq/100 mL IVPB (Peripheral Line)  10 mEq Intravenous PRN Peter Wen MD        0.9 % sodium chloride infusion   Intravenous PRN Peter Wen MD        morphine injection 1 mg  1 mg Intravenous Q4H PRN Peter Wen MD   1 mg at 04/28/21 1104    sucralfate (CARAFATE) tablet 1 g  1 g Oral 4 times per day Peter Wen MD   1 g at 04/29/21 1741    lidocaine PF 1 % injection 5 mL  5 mL Intradermal Once Peter Wen MD        sodium chloride flush 0.9 % injection 5-40 mL  5-40 mL Intravenous 2 times per day Peter Wen MD   10 mL at 04/28/21 2015    sodium chloride flush 0.9 % injection 5-40 mL  5-40 mL Intravenous PRN Peter Wen MD        0.9 % sodium chloride infusion  25 mL Intravenous PRN Peter Wen MD        acetaminophen (TYLENOL) tablet 650 mg  650 mg Oral Q6H PRN Peter Wen MD        Or   Dajuan Castor acetaminophen (TYLENOL) suppository 650 mg  650 mg Rectal Q6H PRN Peter Wen MD        ondansetron (ZOFRAN-ODT) disintegrating tablet 4 mg  4 mg Oral Q8H PRN Peter Wen MD        Or    ondansetron [facility]) injection 4 mg  4 mg Intravenous Q6H PRN Peter Wen MD        pantoprazole (PROTONIX) 80 mg in sodium chloride 0.9 % 100 mL infusion  8 mg/hr Intravenous Continuous Peter Wen MD 10 mL/hr at 04/29/21 1530 8 mg/hr at 04/29/21 1530    Vitamin D (CHOLECALCIFEROL) tablet 2,000 Units  2,000 Units Oral Daily Peter Wen MD   2,000 Units at 04/29/21 0945    FLUoxetine (PROZAC) capsule 20 mg  20 mg Oral Daily Peter Wen MD   20 mg at 04/29/21 0945    pregabalin (LYRICA) capsule 50 mg  50 mg Oral TID Peter Wen MD   50 mg at 04/29/21 1338    triamterene-hydroCHLOROthiazide (MAXZIDE-25) 37.5-25 MG per tablet 1 tablet  1 tablet Oral Daily Peter Wen MD   1 tablet at 04/29/21 0945    [START ON 4/30/2021] pantoprazole (PROTONIX) injection 40 mg  40 mg Intravenous BID Chuy Egan MD        And   Riley Caleb [START ON 2021] sodium chloride (PF) 0.9 % injection 10 mL  10 mL Intravenous BID Chuy Egan MD        Calamine 8-8 % lotion   Topical PRN Chuy Egan MD   Given at 21 2823       Past Surgical History:   Procedure Laterality Date    BLADDER SURGERY      BLADDER SUSPENSION      , done with hysterectomy    HYSTERECTOMY, VAGINAL      done while bladder surgery being done   [facility]       with hysterectomy    THYROIDECTOMY      2013    UPPER GASTROINTESTINAL ENDOSCOPY N/A 2021    EGD DIAGNOSTIC ONLY performed by Tasneem Parkinson MD at [facility]       Past Medical History:   Diagnosis Date    Depression     Factor 5 Leiden mutation, heterozygous ([facility]     Pulmonary embolism ([facility]     Thyroid disease     Warfarin anticoagulation        Family History   Problem Relation Age of Onset    Breast Cancer Mother          at age 50 years   Ade Caleb Heart Attack Father         when pt 1 months old  of a fatal MI - was at age 64 years    No Known Problems Son          in MVA at age 24 years    No Known Problems Son     No Known Problems Son     No Known Problems Son        Social History     Tobacco Use    Smoking status: Never Smoker    Smokeless tobacco: Never Used   Substance Use Topics    Alcohol use: Not Currently         Review of Systems  Denies any SOB or chest pain   + for diarrhea sporadically       PHYSICAL EXAM:   Examination of the ears demonstrated normal-appearing TM and external canal bilaterally. Facial exam demonstrated the patient has some mild tenderness to the right inferior orbital region with no obvious abnormalities to observation. She is noted with some ecchymotic changes from the fall. Nasal exam demonstrated the patient to have evidence of sutures to the right nare externally that appeared to be stable.   Nasal exam with the speculum demonstrated the left side to be unremarkable for any ulcerations or bleeding. She is noted to have deviation of the septum to the right and due to the deviation as well as the trauma to the external portion of the nose, visualization was somewhat poor. No active bleeding or any masses were noted. Oral exam demonstrated the tongue to be midline the posterior pharyngeal wall demonstrated no erythema to exam or any type of bleeding. Neck demonstrated be supple without lymphadenopathy or any evidence of thyromegaly. Due to the CT report of the abdomen pelvis a rectal exam was performed. She was noted with no palpable masses to digital exam with loose stool noted that somewhat melanic. Impression: Stable nasal fracture from accidental fall    Plan: I will have her to see Dr. Keo Kerr in the office next week for suture removal and for reassessment of the nasal fracture once the edema has resolved. Sorry for the delay in the consult due to notification just being received this morning. Please call if any questions. She is okay for discharge once cleared by others.       Electronically signed by Angie Malhotra PA-C on 4/29/21 at 6:12 PM CDT

## 2021-04-30 ENCOUNTER — TELEPHONE (OUTPATIENT)
Dept: VASCULAR SURGERY | Age: 66
End: 2021-04-30

## 2021-04-30 VITALS
HEART RATE: 62 BPM | BODY MASS INDEX: 33.09 KG/M2 | WEIGHT: 175.3 LBS | HEIGHT: 61 IN | TEMPERATURE: 98 F | SYSTOLIC BLOOD PRESSURE: 119 MMHG | DIASTOLIC BLOOD PRESSURE: 72 MMHG | OXYGEN SATURATION: 100 % | RESPIRATION RATE: 16 BRPM

## 2021-04-30 LAB
ANION GAP SERPL CALCULATED.3IONS-SCNC: 10 MMOL/L (ref 7–19)
BUN BLDV-MCNC: 9 MG/DL (ref 8–23)
CALCIUM SERPL-MCNC: 7.4 MG/DL (ref 8.8–10.2)
CHLORIDE BLD-SCNC: 108 MMOL/L (ref 98–111)
CO2: 25 MMOL/L (ref 22–29)
CREAT SERPL-MCNC: 0.7 MG/DL (ref 0.5–0.9)
GFR AFRICAN AMERICAN: >59
GFR NON-AFRICAN AMERICAN: >60
GLUCOSE BLD-MCNC: 80 MG/DL (ref 74–109)
HCT VFR BLD CALC: 25.8 % (ref 37–47)
HEMOGLOBIN: 8.4 G/DL (ref 12–16)
MCH RBC QN AUTO: 29.4 PG (ref 27–31)
MCHC RBC AUTO-ENTMCNC: 32.6 G/DL (ref 33–37)
MCV RBC AUTO: 90.2 FL (ref 81–99)
PDW BLD-RTO: 15 % (ref 11.5–14.5)
PLATELET # BLD: 170 K/UL (ref 130–400)
PMV BLD AUTO: 9.9 FL (ref 9.4–12.3)
POTASSIUM REFLEX MAGNESIUM: 3.6 MMOL/L (ref 3.5–5)
RBC # BLD: 2.86 M/UL (ref 4.2–5.4)
SODIUM BLD-SCNC: 143 MMOL/L (ref 136–145)
WBC # BLD: 4.7 K/UL (ref 4.8–10.8)

## 2021-04-30 PROCEDURE — 6370000000 HC RX 637 (ALT 250 FOR IP): Performed by: SURGERY

## 2021-04-30 PROCEDURE — 36415 COLL VENOUS BLD VENIPUNCTURE: CPT

## 2021-04-30 PROCEDURE — 6370000000 HC RX 637 (ALT 250 FOR IP): Performed by: PHYSICIAN ASSISTANT

## 2021-04-30 PROCEDURE — 6360000002 HC RX W HCPCS: Performed by: SURGERY

## 2021-04-30 PROCEDURE — 80048 BASIC METABOLIC PNL TOTAL CA: CPT

## 2021-04-30 PROCEDURE — 85027 COMPLETE CBC AUTOMATED: CPT

## 2021-04-30 PROCEDURE — C9113 INJ PANTOPRAZOLE SODIUM, VIA: HCPCS | Performed by: SURGERY

## 2021-04-30 PROCEDURE — 2580000003 HC RX 258: Performed by: SURGERY

## 2021-04-30 RX ORDER — SUCRALFATE 1 G/1
1 TABLET ORAL 4 TIMES DAILY
Qty: 56 TABLET | Refills: 0 | Status: SHIPPED | OUTPATIENT
Start: 2021-04-30 | End: 2021-05-28 | Stop reason: ALTCHOICE

## 2021-04-30 RX ORDER — PANTOPRAZOLE SODIUM 40 MG/1
40 TABLET, DELAYED RELEASE ORAL
Qty: 28 TABLET | Refills: 0 | Status: SHIPPED | OUTPATIENT
Start: 2021-04-30 | End: 2021-05-28 | Stop reason: ALTCHOICE

## 2021-04-30 RX ADMIN — SUCRALFATE 1 G: 1 TABLET ORAL at 12:48

## 2021-04-30 RX ADMIN — SODIUM CHLORIDE 10 ML: 9 INJECTION, SOLUTION INTRAMUSCULAR; INTRAVENOUS; SUBCUTANEOUS at 10:10

## 2021-04-30 RX ADMIN — PANTOPRAZOLE SODIUM 40 MG: 40 INJECTION, POWDER, FOR SOLUTION INTRAVENOUS at 08:40

## 2021-04-30 RX ADMIN — TRIAMTERENE AND HYDROCHLOROTHIAZIDE 1 TABLET: 37.5; 25 TABLET ORAL at 08:41

## 2021-04-30 RX ADMIN — SUCRALFATE 1 G: 1 TABLET ORAL at 05:27

## 2021-04-30 RX ADMIN — SUCRALFATE 1 G: 1 TABLET ORAL at 01:10

## 2021-04-30 RX ADMIN — SALINE NASAL SPRAY 2 SPRAY: 1.5 SOLUTION NASAL at 08:41

## 2021-04-30 RX ADMIN — Medication 2000 UNITS: at 08:40

## 2021-04-30 RX ADMIN — POTASSIUM CHLORIDE 40 MEQ: 1500 TABLET, EXTENDED RELEASE ORAL at 08:41

## 2021-04-30 RX ADMIN — PREGABALIN 50 MG: 50 CAPSULE ORAL at 08:41

## 2021-04-30 RX ADMIN — FLUOXETINE HYDROCHLORIDE 20 MG: 20 CAPSULE ORAL at 08:40

## 2021-04-30 NOTE — DISCHARGE SUMMARY
coordination of care. Education modalities have been attached after visit summary. Home health services have been placed and arranged prior to patient's discharge. Consultants:   IP CONSULT TO GI  IP CONSULT TO GI  IP CONSULT TO OTOLARYNGOLOGY  IP CONSULT TO VASCULAR SURGERY  IP CONSULT TO HOME CARE NEEDS    Time Spent on Discharge:  45  minutes were spent in patient examination, evaluation, counseling as well as medication reconciliation, prescriptions for required medications, discharge plan and follow up.       Surgeries/Procedures Performed:  Procedure(s):  EGD DIAGNOSTIC ONLY     Treatments:   analgesia: acetaminophen, cardiac meds: Triamterenehydrochlorothiazide, respiratory therapy: O2, therapies: PT and OT, procedures: IVC filter and lecture light monitoring and stabilization    Significant Diagnostic Studies:   Recent Labs:  CBC:   Lab Results   Component Value Date    WBC 4.7 04/30/2021    RBC 2.86 04/30/2021    HGB 8.4 04/30/2021    HCT 25.8 04/30/2021    MCV 90.2 04/30/2021    MCH 29.4 04/30/2021    MCHC 32.6 04/30/2021    RDW 15.0 04/30/2021     04/30/2021     BMP:    Lab Results   Component Value Date    GLUCOSE 80 04/30/2021     04/30/2021    K 3.6 04/30/2021     04/30/2021    CO2 25 04/30/2021    ANIONGAP 10 04/30/2021    BUN 9 04/30/2021    CREATININE 0.7 04/30/2021    CALCIUM 7.4 04/30/2021    LABGLOM >60 04/30/2021    GFRAA >59 04/30/2021     HFP:    Lab Results   Component Value Date    PROT 5.8 04/27/2021    PROT 6.8 10/09/2012     CMP:    Lab Results   Component Value Date    GLUCOSE 80 04/30/2021     04/30/2021    K 3.6 04/30/2021     04/30/2021    CO2 25 04/30/2021    BUN 9 04/30/2021    CREATININE 0.7 04/30/2021    ANIONGAP 10 04/30/2021    ALKPHOS 50 04/27/2021    ALT 7 04/27/2021    AST 17 04/27/2021    BILITOT <0.2 04/27/2021    LABALBU 3.7 04/27/2021    LABGLOM >60 04/30/2021    GFRAA >59 04/30/2021    PROT 5.8 04/27/2021    PROT 6.8 10/09/2012 CALCIUM 7.4 04/30/2021     PT/INR:    Lab Results   Component Value Date    PROTIME 19.2 04/29/2021    INR 1.60 04/29/2021     PTT:   Lab Results   Component Value Date    APTT 32.0 04/27/2021     FLP:  No results found for: CHOL, TRIG, HDL  U/A:  No results found for: Mary Papa, SPECGRAV, HGBUR, PHUR, PROTEINU, GLUCOSEU, KETUA, BILIRUBINUR, UROBILINOGEN, NITRU, LEUKOCYTESUR  TSH:  No results found for: TSH    Radiology Last 7 Days:  Ct Head Wo Contrast    Result Date: 4/27/2021  No acute intracranial abnormality. The displaced fracture of the nasal bones. Above study was initially reviewed and reported by stat rads. I do not find any discrepancies. Signed by Dr Sohail Chakraborty on 4/27/2021 6:29 AM    Ct Facial Bones Wo Contrast    Result Date: 4/27/2021  A moderately displaced fracture of the right nasal bone with a mild overlapping of the bony fragments. A nondisplaced fracture of the anterior nasal spine. The above study was initially reviewed and reported by stat rads. I do not find any discrepancies. Signed by Dr Sohail Chakraborty on 4/27/2021 6:49 AM    Ct Abdomen Pelvis W Iv Contrast Additional Contrast? None    Result Date: 4/27/2021  An incompletely visualized distal rectum and anorectal junction with suggestion of moderate thickening of the walls? . The possibility of inflammatory or neoplastic process in this area may not be excluded. This may be clinically correlated and further evaluated. The remaining abdomen and pelvis is unremarkable as detailed above. The above study was initially reviewed and reported by stat rad[facility]. The noncritical discrepancy mentioned above report with ER by a priority report. Signed by Dr Sohail Chakraborty on 4/27/2021 7:23 AM    Xr Chest Portable    Result Date: 4/27/2021  No active cardiopulmonary disease. Signed by Dr Sohail Chakraborty on 4/27/2021 7:39 AM    Physical Exam  Vitals signs and nursing note reviewed. Constitutional:       Comments: Clinical tone improved this a.m. as: PROZAC     HYDROcodone-acetaminophen 7.5-325 MG per tablet  Commonly known as: NORCO     MAXZIDE PO     pregabalin 50 MG capsule  Commonly known as: LYRICA     Vitamin D3 050110 UNIT/GM Powd        STOP taking these medications    warfarin 1 MG tablet  Commonly known as: COUMADIN     warfarin 3 MG tablet  Commonly known as: COUMADIN           Where to Get Your Medications      These medications were sent to [facility], [address] [place] [address] [phone] [facility] [phone]  [address], [address]    Phone: [phone]   · pantoprazole 40 MG tablet  · sodium chloride 0.65 % nasal spray  · sucralfate 1 GM tablet         EMR Dragon/Transcription disclaimer:   Much of this encounter note is an electronic transcription/translation of spoken language to printed text.  The electronic translation of spoken language may permit erroneous, or at times, nonsensical words or phrases to be inadvertently transcribed; although attempts have made to review the note for such errors, some may still exist.    Electronically signed by   Jeremy River   Internal Medicine Hospitalist  On 4/30/2021  At 10:38 AM

## 2021-04-30 NOTE — PROGRESS NOTES
Patient is post op placement of IVC filter per Chica Flower on 4/29/21. Right IJ puncture site with small gauze and Tegaderm CDI. No bleeding, no hematoma, no bruising noted at site, site is soft to touch. Discussed with patient that filter can be retrieved and Vascular would follow her outpatient in 6-8 weeks, questions answered and patient voices understanding.

## 2021-04-30 NOTE — TELEPHONE ENCOUNTER
Hospital called patient needs 4 to 6 week follow up with Dr Julia Phillips call the patient with appointment.

## 2021-05-03 ENCOUNTER — TELEPHONE (OUTPATIENT)
Dept: GASTROENTEROLOGY | Age: 66
End: 2021-05-03

## 2021-05-03 ENCOUNTER — OFFICE VISIT (OUTPATIENT)
Dept: ENT CLINIC | Age: 66
End: 2021-05-03
Payer: MEDICARE

## 2021-05-03 VITALS
SYSTOLIC BLOOD PRESSURE: 134 MMHG | WEIGHT: 172 LBS | BODY MASS INDEX: 32.47 KG/M2 | HEIGHT: 61 IN | DIASTOLIC BLOOD PRESSURE: 80 MMHG

## 2021-05-03 DIAGNOSIS — S02.2XXD CLOSED FRACTURE OF NASAL BONE WITH ROUTINE HEALING, SUBSEQUENT ENCOUNTER: ICD-10-CM

## 2021-05-03 PROCEDURE — 1090F PRES/ABSN URINE INCON ASSESS: CPT | Performed by: OTOLARYNGOLOGY

## 2021-05-03 PROCEDURE — G8428 CUR MEDS NOT DOCUMENT: HCPCS | Performed by: OTOLARYNGOLOGY

## 2021-05-03 PROCEDURE — G8417 CALC BMI ABV UP PARAM F/U: HCPCS | Performed by: OTOLARYNGOLOGY

## 2021-05-03 PROCEDURE — 99202 OFFICE O/P NEW SF 15 MIN: CPT | Performed by: OTOLARYNGOLOGY

## 2021-05-03 NOTE — TELEPHONE ENCOUNTER
Agree, I have never evaluated this pt before. Her PCP should be monitoring labs. Ill check labs when I see her as a new pt.   thanks

## 2021-05-03 NOTE — PROGRESS NOTES
Zoroastrianism service: None     Active member of club or organization: None     Attends meetings of clubs or organizations: None     Relationship status: None    Intimate partner violence     Fear of current or ex partner: None     Emotionally abused: None     Physically abused: None     Forced sexual activity: None   Other Topics Concern    None   Social History Narrative    CODE STATUS: Full Code    HEALTH CARE PROXY: Mr. Shahram Smith, her , [phone]    AMBULATES: independently    DOMICILED: lives in a private home, has no stairs inside, has 6 steps to enter front door     Past Medical History:   Diagnosis Date    Depression     Factor 5 Leiden mutation, heterozygous ([facility] [address]     Pulmonary embolism ([facility] [address]     Thyroid disease     Warfarin anticoagulation      Past Surgical History:   Procedure Laterality Date    BLADDER SURGERY      BLADDER SUSPENSION      2013, done with hysterectomy    HYSTERECTOMY, VAGINAL  2013    done while bladder surgery being done   [facility]      2013 with hysterectomy    THYROIDECTOMY      2013    UPPER GASTROINTESTINAL ENDOSCOPY N/A 4/28/2021    EGD DIAGNOSTIC ONLY performed by Libby Izaguirre MD at [facility]         REVIEW OF SYSTEMS:  all other systems reviewed and are negative  General Health: see HPI / problem list  Nose: nose bleeds: No and obstruction: No       Comments:     PHYSICAL EXAM:    /80   Ht 5' 1\" (1.549 m)   Wt 172 lb (78 kg)   BMI 32.50 kg/m²   Body mass index is 32.5 kg/m². General Appearance: well developed , well nourished and no distress  Head/ Face: normocephalic and Minimal swelling in nasal dorsum region. Healing sutured laceration at right nostril.   Bony external nasal pyramid with cosmetically a acceptable appearance and apparent good alignment  Vocal Quality: good/ normal  Ears: Right Ear: External: external ears normal Otoscopy Ear Canal: canal clear Otoscopy TM: TM's normal Left Ear: External: external ears normal Otoscopy Ear Canal: canal clear Otoscopy TM: TM's normal  Hearing: grossly intact  Nose: septum deviated Yes and Slight leftward deviation and  Left  Oral:lips: normal Oropharynx:normal tongue: normal   Neck: negative and supple  Thyroid: thyroidectomy scar  Neuro: alert and oriented x3 and cranial nerves II- XII grossly intact  Psych/ Mood: cooperative and no depression, anxiety or agitation    Assessment & Plan:    Problem List Items Addressed This Visit     Closed fracture of nasal bones     Good alignment of bony nasal pyramid  Very acceptable cosmetic appearance. Patient does not wish any operative intervention and I would agree that no attempt at closed reduction is necessary as overall the nasal bones appear to be well aligned externally. Internally she has a slight septal deviation to the left with no evidence of a septal hematoma and overall reports no significant nasal obstruction. She does have a small laceration of the right nares region. Sutures will be removed on today's visit               No orders of the defined types were placed in this encounter. No orders of the defined types were placed in this encounter. Please note that this chart was generated using dragon dictation software. Although every effort was made to ensure the accuracy of this automated transcription, some errors in transcription may have occurred.

## 2021-05-03 NOTE — TELEPHONE ENCOUNTER
FINDINGS:  1. Nonbleeding 8 mm distal esophageal ulcer secondary to GERD. 2.  A 1.2 cm antral ulcer, origin of GI bleed which has subsided. 3.  A 1 cm inflammatory antral polyp.     PLAN:  Continue to monitor H and H while on Protonix and Carafate. Allow clear liquid diet. Defer colonoscopy at this time but consider at  a later date as needed. Defer restarting Coumadin therapy at this time  with the risks of recurrent bleeding outweighing the reward. Consider  IVC filter.           Travis Oro MD       HFU/NP for Jordan barrientos on 5-28-21. Goldie Sosa from [facility] called today from [phone] said this patient was to have some repeat labs done but no orders have been sent, again NP for Jordan barrientos. I called Goldie Sosa back at the phone number provided, she did not answer, I left her a VM @ 12:42 pm. I told her this patient will be NP for Krista Anne on 5-28-21 and she could contact the patient PCP to order any additional labs needed till the patient is established. I will go ahead and send this to Jordan Rivero to review since [id number] did suggest post Egd to monitor the patient's H&H so I wanted to see if Jordan Rivero would want that done before this patient comes in for her appt or just do on the day of her appointment. To Jordan barrientos.  [facility]

## 2021-05-03 NOTE — ASSESSMENT & PLAN NOTE
Good alignment of bony nasal pyramid  Very acceptable cosmetic appearance. Patient does not wish any operative intervention and I would agree that no attempt at closed reduction is necessary as overall the nasal bones appear to be well aligned externally. Internally she has a slight septal deviation to the left with no evidence of a septal hematoma and overall reports no significant nasal obstruction. She does have a small laceration of the right nares region.   Sutures will be removed on today's visit

## 2021-05-28 ENCOUNTER — OFFICE VISIT (OUTPATIENT)
Dept: GASTROENTEROLOGY | Age: 66
End: 2021-05-28
Payer: MEDICARE

## 2021-05-28 VITALS
BODY MASS INDEX: 33.79 KG/M2 | HEIGHT: 61 IN | DIASTOLIC BLOOD PRESSURE: 70 MMHG | OXYGEN SATURATION: 99 % | HEART RATE: 79 BPM | SYSTOLIC BLOOD PRESSURE: 120 MMHG | WEIGHT: 179 LBS

## 2021-05-28 DIAGNOSIS — K22.10 ULCER OF ESOPHAGUS WITHOUT BLEEDING: ICD-10-CM

## 2021-05-28 DIAGNOSIS — K52.9 CHRONIC DIARRHEA: ICD-10-CM

## 2021-05-28 DIAGNOSIS — K25.9 GASTRIC ULCER WITHOUT HEMORRHAGE OR PERFORATION, UNSPECIFIED CHRONICITY: ICD-10-CM

## 2021-05-28 DIAGNOSIS — D64.9 ANEMIA, UNSPECIFIED TYPE: ICD-10-CM

## 2021-05-28 DIAGNOSIS — Z09 HOSPITAL DISCHARGE FOLLOW-UP: Primary | ICD-10-CM

## 2021-05-28 DIAGNOSIS — Z86.010 PERSONAL HISTORY OF COLONIC POLYPS: ICD-10-CM

## 2021-05-28 PROBLEM — Z86.0100 PERSONAL HISTORY OF COLONIC POLYPS: Status: ACTIVE | Noted: 2021-05-28

## 2021-05-28 LAB
HCT VFR BLD CALC: 30.1 % (ref 37–47)
HEMOGLOBIN: 9.3 G/DL (ref 12–16)
MCH RBC QN AUTO: 27.8 PG (ref 27–31)
MCHC RBC AUTO-ENTMCNC: 30.9 G/DL (ref 33–37)
MCV RBC AUTO: 89.9 FL (ref 81–99)
PDW BLD-RTO: 14.3 % (ref 11.5–14.5)
PLATELET # BLD: 233 K/UL (ref 130–400)
PMV BLD AUTO: 10.2 FL (ref 9.4–12.3)
RBC # BLD: 3.35 M/UL (ref 4.2–5.4)
WBC # BLD: 5.3 K/UL (ref 4.8–10.8)

## 2021-05-28 PROCEDURE — 1123F ACP DISCUSS/DSCN MKR DOCD: CPT | Performed by: NURSE PRACTITIONER

## 2021-05-28 PROCEDURE — 1090F PRES/ABSN URINE INCON ASSESS: CPT | Performed by: NURSE PRACTITIONER

## 2021-05-28 PROCEDURE — G8400 PT W/DXA NO RESULTS DOC: HCPCS | Performed by: NURSE PRACTITIONER

## 2021-05-28 PROCEDURE — 99203 OFFICE O/P NEW LOW 30 MIN: CPT | Performed by: NURSE PRACTITIONER

## 2021-05-28 PROCEDURE — G8427 DOCREV CUR MEDS BY ELIG CLIN: HCPCS | Performed by: NURSE PRACTITIONER

## 2021-05-28 PROCEDURE — 4040F PNEUMOC VAC/ADMIN/RCVD: CPT | Performed by: NURSE PRACTITIONER

## 2021-05-28 PROCEDURE — 1036F TOBACCO NON-USER: CPT | Performed by: NURSE PRACTITIONER

## 2021-05-28 PROCEDURE — 1111F DSCHRG MED/CURRENT MED MERGE: CPT | Performed by: NURSE PRACTITIONER

## 2021-05-28 PROCEDURE — G8417 CALC BMI ABV UP PARAM F/U: HCPCS | Performed by: NURSE PRACTITIONER

## 2021-05-28 PROCEDURE — 3017F COLORECTAL CA SCREEN DOC REV: CPT | Performed by: NURSE PRACTITIONER

## 2021-05-28 RX ORDER — LEVOTHYROXINE SODIUM 88 UG/1
TABLET ORAL
COMMUNITY
Start: 2021-05-07 | End: 2022-01-20

## 2021-05-28 RX ORDER — PANTOPRAZOLE SODIUM 40 MG/1
40 TABLET, DELAYED RELEASE ORAL
Qty: 60 TABLET | Refills: 2 | Status: SHIPPED | OUTPATIENT
Start: 2021-05-28 | End: 2021-09-29

## 2021-05-28 RX ORDER — SUCRALFATE 1 G/1
1 TABLET ORAL
Qty: 90 TABLET | Refills: 2 | Status: SHIPPED | OUTPATIENT
Start: 2021-05-28 | End: 2021-08-19 | Stop reason: ALTCHOICE

## 2021-05-28 RX ORDER — ONDANSETRON 4 MG/1
TABLET, FILM COATED ORAL
Qty: 6 TABLET | Refills: 0 | Status: SHIPPED | OUTPATIENT
Start: 2021-05-28 | End: 2022-07-08

## 2021-05-28 RX ORDER — SIMVASTATIN 40 MG
40 TABLET ORAL NIGHTLY
COMMUNITY
Start: 2021-05-03

## 2021-05-28 RX ORDER — CALCIUM CARBONATE 200(500)MG
3 TABLET,CHEWABLE ORAL 2 TIMES DAILY
COMMUNITY

## 2021-05-28 RX ORDER — POTASSIUM CHLORIDE 20 MEQ/1
20 TABLET, EXTENDED RELEASE ORAL 2 TIMES DAILY
COMMUNITY
Start: 2021-05-08

## 2021-05-28 ASSESSMENT — ENCOUNTER SYMPTOMS
COUGH: 0
VOMITING: 0
ABDOMINAL PAIN: 0
SHORTNESS OF BREATH: 0
TROUBLE SWALLOWING: 0
DIARRHEA: 1
RECTAL PAIN: 0
CONSTIPATION: 0
ANAL BLEEDING: 0
BLOOD IN STOOL: 0
SORE THROAT: 0
ABDOMINAL DISTENTION: 0
NAUSEA: 0
VOICE CHANGE: 0
BACK PAIN: 1

## 2021-05-28 NOTE — PROGRESS NOTES
Subjective:      Rakesh Rodriguez is a75 y.o. female  Chief Complaint   Patient presents with    New Patient    Follow-Up from Hospital       HPI  PCP: Tuyet Hummel  Referring Provider: Marichuy Ellis MD  New pt referral.  As a hospital follow up. Admit date 4/27/2021  Discharge date 4/30/2021  Admitted for c/o melena. Has Factor V Leiden, anticoagulated with Coumadin. Coumadin was stopped and a filter placed during hospital stay. Admission H/H 9.2 / 28.9  Received 3 units of pBCs. Discharge H/H 8.4 / 25.8  [facility] Dr [facility] was [facility]. EGD performed and revealed an esophageal and gastric ulcer, antral bx negative for h pylori. Colonoscopy not performed. Reports her last colonoscopy was in 2017 and had a polyp removed and was given a 1 year recall. She was given a 14 day supply of protonix and carafate. So has been off of this for a good 2 weeks. No further episodes of melena since hospital discharge. Reports she has diarrhea. Chronic for 3 years. Occurs postprandially. 1-2 diarrhea stools a day on average. No lower abd pain. S/p paige approx 7 years ago. Family HX:    Pt denies family hx of colon polyps, colon CA, inflammatory bowel dx, gastric CA and esophageal CA.     Past Medical History:   Diagnosis Date    Depression     [facility] Le[facility] [address]     Pulmonary embolism ([facility] [address]     Thyroid disease     Warfarin anticoagulation           Past Surgical History:   Procedure Laterality Date    BLADDER SURGERY      BLADDER SUSPENSION      2013, done with hysterectomy    COLONOSCOPY  approx 2017    [facility]:  \"polyps repeat in 1 yr\" per pt recall   [address], VAGINAL  2013    done while bladder surgery being done   [facility]      2013 with hysterectomy    THYROIDECTOMY      2013    UPPER GASTROINTESTINAL ENDOSCOPY N/A 04/28/2021    Dr Dos Santos-Nonbleeding 8 mm distal esophageal ulcer secondary to GERD, 1.2 cm antral ulcer, 1 cm inflammatory antral polyp-HP, gastritis       Social History     Socioeconomic History    Marital status:      Spouse name: Mr. Priscilla Zurita Number of children: 4    Years of education: None    Highest education level: None   Occupational History    Occupation: worked as a Nurses Aide   Tobacco Use    Smoking status: Never Smoker    Smokeless tobacco: Never Used   Vaping Use    Vaping Use: Never used   Substance and Sexual Activity    Alcohol use: Not Currently    Drug use: Never    Sexual activity: None     Comment: has 4 sons   Other Topics Concern    None   Social History Narrative    CODE STATUS: Full Code    HEALTH CARE PROXY: Mr. Burke Copeland, her , [phone]    AMBULATES: independently    DOMICILED: lives in a private home, has no stairs inside, has 6 steps to enter front door     Social Determinants of Health     Financial Resource Strain:     Difficulty of Paying Living Expenses:    Food Insecurity:     Worried About [email or web address] in the Last Year:     [address] [employer] in the Last Year:    Transportation Needs:     Lack of Transportation (Medical):  Lack of Transportation (Non-Medical):    Physical Activity:     Days of Exercise per Week:     Minutes of Exercise per Session:    Stress:     Feeling of Stress :    Social Connections:     Frequency of Communication with Friends and Family:     Frequency of Social Gatherings with Friends and Family:     Attends [employer] Services:     Active Member of Clubs or Organizations:     Attends Club or Organization Meetings:     Marital Status:    Intimate Partner Violence:     Fear of Current or Ex-Partner:     Emotionally Abused:     Physically Abused:     Sexually Abused:         Allergies   Allergen Reactions    Bacitracin-Polymyxin B Hives, Itching and Rash    Macrobid [Nitrofurantoin] Itching and Rash    Neomycin-Bacitracin-Polymyxin [Bacitracin-Neomycin-Polymyxin] Hives, Itching and Rash    Sulfa Antibiotics Hives, Itching and Rash       Current Outpatient Medications   Medication Sig Dispense Refill    potassium chloride (KLOR-CON M) 20 MEQ extended release tablet TAKE 1 TABLET BY MOUTH ONCE DAILY      simvastatin (ZOCOR) 40 MG tablet TAKE 1 TABLET BY MOUTH ONCE DAILY      EUTHYROX 88 MCG tablet TAKE 1 TABLET BY MOUTH ONCE DAILY      calcium carbonate (TUMS) 500 MG chewable tablet Take 3 tablets by mouth 2 times daily       pantoprazole (PROTONIX) 40 MG tablet Take 1 tablet by mouth 2 times daily (before meals) for 180 doses 60 tablet 2    sucralfate (CARAFATE) 1 GM tablet Take 1 tablet by mouth 3 times daily (before meals) 90 tablet 2    sodium chloride (OCEAN, BABY AYR) 0.65 % nasal spray 2 sprays by Nasal route every 2 hours as needed for Congestion 1 Bottle 0    Triamterene-HCTZ (MAXZIDE PO) Take 37.5 mg by mouth Pt takes 37.5/25 mg daily      FLUoxetine (PROZAC) 20 MG capsule Take 20 mg by mouth daily      Cholecalciferol (VITAMIN D3) 488090 UNIT/GM POWD by Does not apply route      pregabalin (LYRICA) 50 MG capsule Take 50 mg by mouth 2 times daily.  HYDROcodone-acetaminophen (NORCO) 7.5-325 MG per tablet Take 1 tablet by mouth every 6 hours as needed for Pain. No current facility-administered medications for this visit. Review of Systems   Constitutional: Negative for appetite change, fatigue, fever and unexpected weight change. HENT: Negative for sore throat, trouble swallowing and voice change. Respiratory: Negative for cough and shortness of breath. Cardiovascular: Negative for chest pain, palpitations and leg swelling. Gastrointestinal: Positive for diarrhea. Negative for abdominal distention, abdominal pain, anal bleeding, blood in stool, constipation, nausea, rectal pain and vomiting. Genitourinary: Negative for hematuria. Musculoskeletal: Positive for back pain. Negative for arthralgias and neck pain.    Neurological: Negative for dizziness, weakness, light-headedness and headaches. Psychiatric/Behavioral: Negative for dysphoric mood and sleep disturbance. The patient is not nervous/anxious. All other systems reviewed and are negative. Objective:     Physical Exam  Vitals and nursing note reviewed. Constitutional:       Appearance: She is well-developed. Comments: /70   Pulse 79   Ht 5' 1\" (1.549 m)   Wt 179 lb (81.2 kg)   SpO2 99%   BMI 33.82 kg/m²    Eyes:      General: No scleral icterus. Conjunctiva/sclera: Conjunctivae normal.      Pupils: Pupils are equal, round, and reactive to light. Neck:      Thyroid: No thyromegaly. Cardiovascular:      Rate and Rhythm: Normal rate and regular rhythm. Heart sounds: Normal heart sounds. No murmur heard. No friction rub. No gallop. Pulmonary:      Effort: Pulmonary effort is normal. No respiratory distress. Breath sounds: Normal breath sounds. Abdominal:      General: Bowel sounds are normal. There is no distension. Palpations: Abdomen is soft. Tenderness: There is no abdominal tenderness. There is no rebound. Musculoskeletal:         General: No deformity. Normal range of motion. Cervical back: Normal range of motion and neck supple. Neurological:      Mental Status: She is alert and oriented to person, place, and time. Cranial Nerves: No cranial nerve deficit. Psychiatric:         Judgment: Judgment normal.           Assessment:       Diagnosis Orders   1. Hospital discharge follow-up     2. Ulcer of esophagus without bleeding  ESOPHAGOSCOPY / EGD    pantoprazole (PROTONIX) 40 MG tablet    sucralfate (CARAFATE) 1 GM tablet   3. Gastric ulcer without hemorrhage or perforation, unspecified chronicity  ESOPHAGOSCOPY / EGD    pantoprazole (PROTONIX) 40 MG tablet    sucralfate (CARAFATE) 1 GM tablet   4. Chronic diarrhea  COLONOSCOPY W/ OR W/O BIOPSY   5.  Anemia, unspecified type  ESOPHAGOSCOPY / EGD

## 2021-05-28 NOTE — PATIENT INSTRUCTIONS

## 2021-06-01 ENCOUNTER — TELEPHONE (OUTPATIENT)
Dept: GASTROENTEROLOGY | Age: 66
End: 2021-06-01

## 2021-06-01 NOTE — TELEPHONE ENCOUNTER
06-1-21 Called and notified patient of lab results as per [facility] APRN.      Verbal agreement per patient  Cv

## 2021-06-07 ENCOUNTER — OFFICE VISIT (OUTPATIENT)
Dept: VASCULAR SURGERY | Age: 66
End: 2021-06-07
Payer: MEDICARE

## 2021-06-07 VITALS
HEART RATE: 88 BPM | TEMPERATURE: 98.6 F | BODY MASS INDEX: 32.2 KG/M2 | RESPIRATION RATE: 18 BRPM | WEIGHT: 175 LBS | DIASTOLIC BLOOD PRESSURE: 78 MMHG | OXYGEN SATURATION: 93 % | SYSTOLIC BLOOD PRESSURE: 118 MMHG | HEIGHT: 62 IN

## 2021-06-07 DIAGNOSIS — D68.51 FACTOR V LEIDEN MUTATION (HCC): Primary | ICD-10-CM

## 2021-06-07 DIAGNOSIS — Z86.711 PERSONAL HISTORY OF PULMONARY EMBOLISM: ICD-10-CM

## 2021-06-07 DIAGNOSIS — Z79.01 LONG TERM CURRENT USE OF ANTICOAGULANT: ICD-10-CM

## 2021-06-07 DIAGNOSIS — K25.4 GASTROINTESTINAL HEMORRHAGE ASSOCIATED WITH GASTRIC ULCER: ICD-10-CM

## 2021-06-07 DIAGNOSIS — Z86.718 PERSONAL HISTORY OF DVT (DEEP VEIN THROMBOSIS): ICD-10-CM

## 2021-06-07 PROCEDURE — G8400 PT W/DXA NO RESULTS DOC: HCPCS | Performed by: PHYSICIAN ASSISTANT

## 2021-06-07 PROCEDURE — 3017F COLORECTAL CA SCREEN DOC REV: CPT | Performed by: PHYSICIAN ASSISTANT

## 2021-06-07 PROCEDURE — 1090F PRES/ABSN URINE INCON ASSESS: CPT | Performed by: PHYSICIAN ASSISTANT

## 2021-06-07 PROCEDURE — G8417 CALC BMI ABV UP PARAM F/U: HCPCS | Performed by: PHYSICIAN ASSISTANT

## 2021-06-07 PROCEDURE — 1036F TOBACCO NON-USER: CPT | Performed by: PHYSICIAN ASSISTANT

## 2021-06-07 PROCEDURE — 4040F PNEUMOC VAC/ADMIN/RCVD: CPT | Performed by: PHYSICIAN ASSISTANT

## 2021-06-07 PROCEDURE — G8427 DOCREV CUR MEDS BY ELIG CLIN: HCPCS | Performed by: PHYSICIAN ASSISTANT

## 2021-06-07 PROCEDURE — 99203 OFFICE O/P NEW LOW 30 MIN: CPT | Performed by: PHYSICIAN ASSISTANT

## 2021-06-07 PROCEDURE — 1123F ACP DISCUSS/DSCN MKR DOCD: CPT | Performed by: PHYSICIAN ASSISTANT

## 2021-06-07 NOTE — PROGRESS NOTES
ONCE DAILY      EUTHYROX 88 MCG tablet TAKE 1 TABLET BY MOUTH ONCE DAILY      calcium carbonate (TUMS) 500 MG chewable tablet Take 3 tablets by mouth 2 times daily       pantoprazole (PROTONIX) 40 MG tablet Take 1 tablet by mouth 2 times daily (before meals) for 180 doses 60 tablet 2    sucralfate (CARAFATE) 1 GM tablet Take 1 tablet by mouth 3 times daily (before meals) 90 tablet 2    ondansetron (ZOFRAN) 4 MG tablet TAKE 1 TABLET 30 MIN BEFORE EACH DOSE OF BOWEL PREP. THEN EVERY 6 HOURS A NEEDED 6 tablet 0    sodium chloride (OCEAN, BABY AYR) 0.65 % nasal spray 2 sprays by Nasal route every 2 hours as needed for Congestion 1 Bottle 0    Triamterene-HCTZ (MAXZIDE PO) Take 37.5 mg by mouth Pt takes 37.5/25 mg daily      FLUoxetine (PROZAC) 20 MG capsule Take 20 mg by mouth daily      Cholecalciferol (VITAMIN D3) 788411 UNIT/GM POWD by Does not apply route      pregabalin (LYRICA) 50 MG capsule Take 50 mg by mouth 2 times daily.  HYDROcodone-acetaminophen (NORCO) 7.5-325 MG per tablet Take 1 tablet by mouth every 6 hours as needed for Pain. No current facility-administered medications for this visit.      Allergies: Bacitracin-polymyxin b, Macrobid [nitrofurantoin], Neomycin-bacitracin-polymyxin [bacitracin-neomycin-polymyxin], and Sulfa antibiotics  Past Medical History:   Diagnosis Date    Depression     Factor 5 Leiden mutation, [facility] ([facility] [address]     Pulmonary embolism ([facility] [address]     Thyroid disease     Warfarin anticoagulation      Past Surgical History:   Procedure Laterality Date    BLADDER SURGERY      BLADDER SUSPENSION      2013, done with hysterectomy    COLONOSCOPY  approx 2017    [facility]:  \"polyps repeat in 1 yr\" per pt recall   [address], VAGINAL  2013    done while bladder surgery being done   [facility]      2013 with hysterectomy    THYROIDECTOMY      2013    UPPER GASTROINTESTINAL ENDOSCOPY N/A 04/28/2021    Dr Ivonne Medina 8 mm distal esophageal ulcer secondary to GERD, 1.2 cm antral ulcer, 1 cm inflammatory antral polyp-HP, gastritis     Family History   Problem Relation Age of Onset    Breast Cancer Mother          at age 50 years   [facility] Heart Attack Father         when pt 1 months old  of a fatal MI - was at age 64 years    No Known Problems Son          in MVA at age 24 years   [facility] No Known Problems Son     No Known Problems Son     No Known Problems Son     Colon Cancer Neg Hx     Colon Polyps Neg Hx      Social History     Tobacco Use    Smoking status: Never Smoker    Smokeless tobacco: Never Used   Substance Use Topics    Alcohol use: Not Currently       ROS  Eyes  no sudden vision change or amaurosis. Respiratory  no significant shortness of breath,  Cardiovascular  no chest pain or syncope. (see HPI)  Musculoskeletal  no gait disturbance  GI - (see HPI)  Skin  no new wound. Neurologic   No speech difficulty or lateralizing weakness. All other review of systems are negative. Physical Exam    /78 (Site: Right Upper Arm, Position: Sitting, Cuff Size: Medium Adult)   Pulse 88   Temp 98.6 °F (37 °C) (Temporal)   Resp 18   Ht 5' 2\" (1.575 m)   Wt 175 lb (79.4 kg)   SpO2 93%   BMI 32.01 kg/m²       Neck- carotid pulses 2+ to palpation with no bruit  Cardiovascular  Regular rate and rhythm. Pulmonary  effort appears normal.  No respiratory distress. Lungs - Breath sounds normal. No wheezes or rales. Extremities -  Radial and brachial pulses are 2+ to palpation bilaterally. DP andPT pulses are palpable. No cyanosis, clubbing,  no  edema or erythema of LE. She has varicosities and spider veins noted of BLE. No signs atheroembolic event. Neurologic  alert and oriented X 3. Physiologic. Face symmetric. Skin  warm, dry, and intact. No wounds noted.    Psychiatric  mood, affect, and behavior appear normal.  Judgment and thought processes appear normal.    Reviewed on this visit: recent hospitalization notes from 4/27-4/30/21       ASSESSMENT/PLAN:      1. GI bleed on Coumadin therapy  2. Factor V Leiden Mutation  3. 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 Placement of IVC filter due to inability to anticoagulate for GI bleed      Recommend she keep f/u Appt with GI, when they deem it safe to restart Coumadin, she should do so. She will call our office after restarting her Coumadin to schedule IVC filter retrieval          An electronic signature was used to authenticate this note.     --Francisco Longoria PA-C

## 2021-06-08 ENCOUNTER — TELEPHONE (OUTPATIENT)
Dept: VASCULAR SURGERY | Age: 66
End: 2021-06-08

## 2021-06-08 NOTE — TELEPHONE ENCOUNTER
----- Message from Elizabeth Meyers PA-C sent at 6/8/2021  8:09 AM CDT -----  Please call and find out when her f/u appt with GI is scheduled.  TY

## 2021-06-08 NOTE — TELEPHONE ENCOUNTER
I spoke with Erma Pretty and asked if she had a f/u appt scheduled with GI yet. Patient said she had an appt with them already and is scheduled for an Endoscope and Colonoscopy on 6/25/21. Once she has that done she said she is to follow-up with Vascular to see about getting her Filter removed.

## 2021-06-25 ENCOUNTER — ANESTHESIA (OUTPATIENT)
Dept: ENDOSCOPY | Age: 66
End: 2021-06-25
Payer: MEDICARE

## 2021-06-25 ENCOUNTER — HOSPITAL ENCOUNTER (OUTPATIENT)
Age: 66
Setting detail: OUTPATIENT SURGERY
Discharge: HOME | End: 2021-06-25
Attending: INTERNAL MEDICINE | Admitting: INTERNAL MEDICINE
Payer: MEDICARE

## 2021-06-25 ENCOUNTER — ANESTHESIA EVENT (OUTPATIENT)
Dept: ENDOSCOPY | Age: 66
End: 2021-06-25
Payer: MEDICARE

## 2021-06-25 VITALS
BODY MASS INDEX: 33.13 KG/M2 | RESPIRATION RATE: 16 BRPM | HEIGHT: 62 IN | TEMPERATURE: 98 F | DIASTOLIC BLOOD PRESSURE: 54 MMHG | OXYGEN SATURATION: 97 % | WEIGHT: 180 LBS | SYSTOLIC BLOOD PRESSURE: 93 MMHG | HEART RATE: 60 BPM

## 2021-06-25 VITALS
OXYGEN SATURATION: 98 % | RESPIRATION RATE: 15 BRPM | DIASTOLIC BLOOD PRESSURE: 48 MMHG | SYSTOLIC BLOOD PRESSURE: 93 MMHG

## 2021-06-25 PROCEDURE — 2580000003 HC RX 258: Performed by: INTERNAL MEDICINE

## 2021-06-25 PROCEDURE — 43239 EGD BIOPSY SINGLE/MULTIPLE: CPT | Performed by: INTERNAL MEDICINE

## 2021-06-25 PROCEDURE — 7100000010 HC PHASE II RECOVERY - FIRST 15 MIN: Performed by: INTERNAL MEDICINE

## 2021-06-25 PROCEDURE — 7100000011 HC PHASE II RECOVERY - ADDTL 15 MIN: Performed by: INTERNAL MEDICINE

## 2021-06-25 PROCEDURE — 3700000001 HC ADD 15 MINUTES (ANESTHESIA): Performed by: INTERNAL MEDICINE

## 2021-06-25 PROCEDURE — 3609012400 HC EGD TRANSORAL BIOPSY SINGLE/MULTIPLE: Performed by: INTERNAL MEDICINE

## 2021-06-25 PROCEDURE — 3609010600 HC COLONOSCOPY POLYPECTOMY SNARE/COLD BIOPSY: Performed by: INTERNAL MEDICINE

## 2021-06-25 PROCEDURE — 88305 TISSUE EXAM BY PATHOLOGIST: CPT

## 2021-06-25 PROCEDURE — 3700000000 HC ANESTHESIA ATTENDED CARE: Performed by: INTERNAL MEDICINE

## 2021-06-25 PROCEDURE — 45385 COLONOSCOPY W/LESION REMOVAL: CPT | Performed by: INTERNAL MEDICINE

## 2021-06-25 PROCEDURE — 6360000002 HC RX W HCPCS: Performed by: NURSE ANESTHETIST, CERTIFIED REGISTERED

## 2021-06-25 PROCEDURE — 2709999900 HC NON-CHARGEABLE SUPPLY: Performed by: INTERNAL MEDICINE

## 2021-06-25 PROCEDURE — 88342 IMHCHEM/IMCYTCHM 1ST ANTB: CPT

## 2021-06-25 PROCEDURE — 2500000003 HC RX 250 WO HCPCS: Performed by: NURSE ANESTHETIST, CERTIFIED REGISTERED

## 2021-06-25 RX ORDER — FENTANYL CITRATE 50 UG/ML
INJECTION, SOLUTION INTRAMUSCULAR; INTRAVENOUS PRN
Status: DISCONTINUED | OUTPATIENT
Start: 2021-06-25 | End: 2021-06-25 | Stop reason: SDUPTHER

## 2021-06-25 RX ORDER — EPHEDRINE SULFATE 50 MG/ML
INJECTION, SOLUTION INTRAVENOUS PRN
Status: DISCONTINUED | OUTPATIENT
Start: 2021-06-25 | End: 2021-06-25 | Stop reason: SDUPTHER

## 2021-06-25 RX ORDER — ACETAMINOPHEN 160 MG
1000 TABLET,DISINTEGRATING ORAL 2 TIMES DAILY
COMMUNITY

## 2021-06-25 RX ORDER — PROPOFOL 10 MG/ML
INJECTION, EMULSION INTRAVENOUS PRN
Status: DISCONTINUED | OUTPATIENT
Start: 2021-06-25 | End: 2021-06-25 | Stop reason: SDUPTHER

## 2021-06-25 RX ORDER — SODIUM CHLORIDE, SODIUM LACTATE, POTASSIUM CHLORIDE, CALCIUM CHLORIDE 600; 310; 30; 20 MG/100ML; MG/100ML; MG/100ML; MG/100ML
INJECTION, SOLUTION INTRAVENOUS CONTINUOUS
Status: DISCONTINUED | OUTPATIENT
Start: 2021-06-25 | End: 2021-06-25 | Stop reason: HOSPADM

## 2021-06-25 RX ORDER — MIDAZOLAM HYDROCHLORIDE 1 MG/ML
INJECTION INTRAMUSCULAR; INTRAVENOUS PRN
Status: DISCONTINUED | OUTPATIENT
Start: 2021-06-25 | End: 2021-06-25 | Stop reason: SDUPTHER

## 2021-06-25 RX ORDER — LIDOCAINE HYDROCHLORIDE 10 MG/ML
INJECTION, SOLUTION INFILTRATION; PERINEURAL PRN
Status: DISCONTINUED | OUTPATIENT
Start: 2021-06-25 | End: 2021-06-25 | Stop reason: SDUPTHER

## 2021-06-25 RX ADMIN — SODIUM CHLORIDE, POTASSIUM CHLORIDE, SODIUM LACTATE AND CALCIUM CHLORIDE: 600; 310; 30; 20 INJECTION, SOLUTION INTRAVENOUS at 09:56

## 2021-06-25 RX ADMIN — LIDOCAINE HYDROCHLORIDE 50 MG: 10 INJECTION, SOLUTION INFILTRATION; PERINEURAL at 10:54

## 2021-06-25 RX ADMIN — PROPOFOL 50 MG: 10 INJECTION, EMULSION INTRAVENOUS at 10:54

## 2021-06-25 RX ADMIN — EPHEDRINE SULFATE 10 MG: 50 INJECTION INTRAMUSCULAR; INTRAVENOUS; SUBCUTANEOUS at 11:01

## 2021-06-25 RX ADMIN — EPHEDRINE SULFATE 10 MG: 50 INJECTION INTRAMUSCULAR; INTRAVENOUS; SUBCUTANEOUS at 10:57

## 2021-06-25 RX ADMIN — MIDAZOLAM 2 MG: 1 INJECTION INTRAMUSCULAR; INTRAVENOUS at 10:54

## 2021-06-25 RX ADMIN — PROPOFOL 140 MCG/KG/MIN: 10 INJECTION, EMULSION INTRAVENOUS at 10:58

## 2021-06-25 RX ADMIN — FENTANYL CITRATE 100 MCG: 50 INJECTION INTRAMUSCULAR; INTRAVENOUS at 10:54

## 2021-06-25 ASSESSMENT — PAIN SCALES - GENERAL
PAINLEVEL_OUTOF10: 0
PAINLEVEL_OUTOF10: 0

## 2021-06-25 ASSESSMENT — PAIN - FUNCTIONAL ASSESSMENT: PAIN_FUNCTIONAL_ASSESSMENT: 0-10

## 2021-06-25 NOTE — OP NOTE
Patient: Busetr Hickey : 1955  Med Rec#: [id number] Acc#: [id number]   Primary Care Provider Kietsintia Wan    Date of Procedure:  2021    Endoscopist: Husam Gonzalez MD, MD    Referring Provider: Kiet Wan,     Operation Performed: Colonoscopy up to the terminal ileum with hot snare polypectomy of a hepatic flexure polyp    Indications: For both EGD and colonoscopy exams today:  1. Hospital discharge follow-up      2. Ulcer of esophagus without bleeding               3. Gastric ulcer without hemorrhage or perforation, unspecified chronicity               4. Chronic diarrhea     5. Anemia, unspecified type               6. Personal history of colonic polyps         Anesthesia:  Sedation was administered by anesthesia who monitored the patient during the procedure. I met with Buster Hickey prior to procedure. We discussed the procedure itself, and I have discussed the risks of endoscopy (including-- but not limited to-- pain, discomfort, bleeding potentially requiring second endoscopic procedure and/or blood transfusion, organ perforation requiring operative repair, damage to organs near the colon, infection, aspiration, cardiopulmonary/allergic reaction), benefits, indications to endoscopy. Additionally, we discussed options other than colonoscopy. The patient expressed understanding. All questions answered. The patient decided to proceed with the procedure. Signed informed consent was placed on the chart. Blood Loss: minimal    Withdrawal time: More than 6 minutes  Bowel Prep: adequate     Complications: no immediate complications    DESCRIPTION OF PROCEDURE:     A time out was performed. After written informed consent was obtained, the patient was placed in the left lateral position. The perianal area was inspected, and a digital rectal exam was performed. A rectal exam was performed: normal tone, no palpable lesions.  At this point, a forward viewing Olympus colonoscope was inserted into the anus and carefully advanced to the terminal ileum with some difficulty requiring external abdominal pressure during parts of this procedure. The cecum was identified by the ileocecal valve and the appendiceal orifice. The colonoscope was then slowly withdrawn with careful inspection of the mucosa in a linear and circumferential fashion. The scope was retroflexed in the rectum. Suction was utilized during the procedure to remove as much air as possible from the bowel. The colonoscope was removed from the patient, and the procedure was terminated. Findings are listed below. Findings:   A 8 mm in diameter sessile hepatic flexure polyp was removed by hot snare polypectomy. Otherwise, the mucosa appeared normal throughout the entire examined colon . A redundant tortuous colon which made the procedure somewhat technically challenging. .  Small nonbleeding internal hemorrhoids on retroflexed exam.. Retroflexion in the rectum otherwise was normal and revealed no further abnormalities     Recommendations:  1. Repeat colonoscopy: pending pathology -in 5 years for colorectal cancer surveillance  2. Await biopsy results-you will receive a letter with your results within 7-10 days    -- Resume previous meds and diet  - GI clinic f/u  6 weeks Ms. Pink; monitor her CBC periodically. If her anemia is not improving or if it is worsening, consider a small bowel PillCam study to complete her GI work-up. - Keep scheduled f/u appts with other MDs     - NO ASA/NSAIDs x 2 weeks    Findings and recommendations were discussed w/ the patient. A copy of the images was provided.     Jamari Parisi MD, MD  6/25/2021  10:46 AM

## 2021-06-25 NOTE — H&P
Patient Name: Emerald Berrios  : 1955  MRN: [id number]  DATE: 21    Allergies: Allergies   Allergen Reactions    Bacitracin-Polymyxin B Hives, Itching and Rash    Macrobid [Nitrofurantoin] Itching and Rash    Neomycin-Bacitracin-Polymyxin [Bacitracin-Neomycin-Polymyxin] Hives, Itching and Rash    Sulfa Antibiotics Hives, Itching and Rash        ENDOSCOPY  History and Physical    Procedure:    [x] Diagnostic Colonoscopy       [] Screening Colonoscopy  [x] EGD      [] ERCP      [] EUS       [] Other    [x] Previous office notes/History and Physical reviewed from the patients chart. Please see EMR for further details of HPI. I have examined the patient's status immediately prior to the procedure and:      Indications/HPI:     For both EGD and colonoscopy exams today:  1. Hospital discharge follow-up      2. Ulcer of esophagus without bleeding               3. Gastric ulcer without hemorrhage or perforation, unspecified chronicity               4. Chronic diarrhea     5. Anemia, unspecified type               6. Personal history of colonic polyps       []Abdominal Pain   []Cancer- GI/Lung     []Fhx of colon CA/polyps  []History of Polyps  []Barretts            []Melena  []Abnormal Imaging              []Dysphagia              []Persistent Pneumonia   []Anemia                            []Food Impaction        []History of Polyps  [] GI Bleed             []Pulmonary nodule/Mass   []Change in bowel habits []Heartburn/Reflux  []Rectal Bleed (BRBPR)  []Chest Pain - Non Cardiac []Heme (+) Stool []Ulcers  []Constipation  []Hemoptysis  []Varices  []Diarrhea  []Hypoxemia    []Nausea/Vomiting   []Screening   []Crohns/Colitis  []Other:     Anesthesia:   [x] MAC [] Moderate Sedation   [] General   [] None     ROS: 12 pt Review of Symptoms was negative unless mentioned above    Medications:   Prior to Admission medications    Medication Sig Start Date End Date Taking?  Authorizing Provider   Cholecalciferol (VITAMIN D3) 50 MCG (2000 UT) CAPS Take 1,000 Units by mouth 2 times daily   Yes Historical Provider, MD   sodium chloride (OCEAN, BABY AYR) 0.65 % nasal spray 2 sprays by Nasal route every 2 hours as needed for Congestion 4/30/21  Yes Angelito Elizalde MD   potassium chloride (KLOR-CON M) 20 MEQ extended release tablet 2 times daily  5/8/21   Historical Provider, MD   simvastatin (ZOCOR) 40 MG tablet TAKE 1 TABLET BY MOUTH ONCE DAILY 5/3/21   Historical Provider, MD   EUTHYROX 88 MCG tablet TAKE 1 TABLET BY MOUTH ONCE DAILY 5/7/21   Historical Provider, MD   calcium carbonate (TUMS) 500 MG chewable tablet Take 3 tablets by mouth 2 times daily     Historical Provider, MD   pantoprazole (PROTONIX) 40 MG tablet Take 1 tablet by mouth 2 times daily (before meals) for 180 doses 5/28/21 8/26/21  IMELDA Pineda   sucralfate (CARAFATE) 1 GM tablet Take 1 tablet by mouth 3 times daily (before meals)  Patient taking differently: Take 1 g by mouth 2 times daily  5/28/21 8/26/21  IMELDA Pineda   ondansetron (ZOFRAN) 4 MG tablet TAKE 1 TABLET 30 MIN BEFORE EACH DOSE OF BOWEL PREP. THEN EVERY 6 HOURS A NEEDED 5/28/21   Miguel A Simpson MD   Triamterene-HCTZ (MAXZIDE PO) Take 37.5 mg by mouth Pt takes 37.5/25 mg daily    Historical Provider, MD   FLUoxetine (PROZAC) 20 MG capsule Take 20 mg by mouth daily    Historical Provider, MD   pregabalin (LYRICA) 50 MG capsule Take 50 mg by mouth 2 times daily. Historical Provider, MD   HYDROcodone-acetaminophen (NORCO) 7.5-325 MG per tablet Take 1 tablet by mouth every 6 hours as needed for Pain.     Historical Provider, MD       Past Medical History:  Past Medical History:   Diagnosis Date    Chronic back pain     pain management    Depression     Factor 5 Leiden mutation, heterozygous ([facility] [address]     Gastric ulcer     GI bleed     History of blood transfusion     Hx of blood clots     Nasal fracture     closed; no surgery or reduction needed    Pulmonary embolism [facility])     Thyroid disease     Warfarin anticoagulation        Past Surgical History:  Past Surgical History:   Procedure Laterality Date    BLADDER SURGERY      BLADDER SUSPENSION      2013, done with hysterectomy    COLONOSCOPY  approx 2017    [address] [facility]:  \"polyps repeat in 1 yr\" per pt recall   [address], VAGINAL  2013    done while bladder surgery being done    [address]      2013 with hysterectomy    THYROIDECTOMY      2013    UPPER GASTROINTESTINAL ENDOSCOPY N/A 04/28/2021    Dr Dos Santos-Nonbleeding 8 mm distal esophageal ulcer secondary to GERD, 1.2 cm antral ulcer, 1 cm inflammatory antral polyp-HP, gastritis       Social History:  Social History     Tobacco Use    Smoking status: Never Smoker    Smokeless tobacco: Never Used   Vaping Use    Vaping Use: Never used   Substance Use Topics    Alcohol use: Not Currently    Drug use: Never       Vital Signs:   Vitals:    06/25/21 0932   BP: 131/76   Pulse: 74   Resp: 18   Temp: 97.8 °F (36.6 °C)   SpO2: 97%        Physical Exam:  Cardiac:  [x]WNL  []Comments:  Pulmonary:  [x]WNL   []Comments:  Neuro/Mental Status:  [x]WNL  []Comments:  Abdominal:  [x]WNL    []Comments:  Other:   []WNL  []Comments:    Informed Consent:  The risks and benefits of the procedure have been discussed with either the patient or if they cannot consent, their representative. Assessment:  Patient examined and appropriate for planned sedation and procedure. Plan:  Proceed with planned sedation and procedure as above.          Lynne Burdick MD

## 2021-06-25 NOTE — ANESTHESIA PRE PROCEDURE
7.5-325 MG per tablet Take 1 tablet by mouth every 6 hours as needed for Pain. Historical Provider, MD       Current medications:    Current Facility-Administered Medications   Medication Dose Route Frequency Provider Last Rate Last Admin    lactated ringers infusion   Intravenous Continuous Holly Parish  mL/hr at 06/25/21 0956 New Bag at 06/25/21 0956       Allergies: Allergies   Allergen Reactions    Bacitracin-Polymyxin B Hives, Itching and Rash    Macrobid [Nitrofurantoin] Itching and Rash    Neomycin-Bacitracin-Polymyxin [Bacitracin-Neomycin-Polymyxin] Hives, Itching and Rash    Sulfa Antibiotics Hives, Itching and Rash       Problem List:    Patient Active Problem List   Diagnosis Code    Warfarin anticoagulation Z79.01    Factor V Leiden mutation ([facility] [address] D68.51    Essential hypertension I10    Lupus anticoagulant disorder ([facility] [address] D68.62    Protein S deficiency ([facility] [address] D68.59    Gastroesophageal reflux disease with esophagitis and hemorrhage K21.01    GI bleed K92.2    Acute blood loss anemia D62    Laceration of nose S01. 21XA    Closed fracture of nasal bones S02. [facility] discharge follow-up Z09    Ulcer of esophagus without bleeding K22.10    Gastric ulcer without hemorrhage or perforation K25.9    Chronic diarrhea K52.9    Anemia D64.9    Personal history of colonic polyps Z86.010       Past Medical History:        Diagnosis Date    Chronic back pain     pain management    Depression     Factor 5 Leiden mutation, heterozygous ([facility] [address]     Gastric ulcer     GI bleed     History of blood transfusion     Hx of blood clots     Nasal fracture     closed; no surgery or reduction needed    Pulmonary embolism ([facility] [address]     Thyroid disease     Warfarin anticoagulation        Past Surgical History:        Procedure Laterality Date    BLADDER SURGERY      BLADDER SUSPENSION      2013, done with hysterectomy    COLONOSCOPY  approx 2017    [address] [facility]:  \"polyps repeat in 1 yr\" per pt recall   [address], VAGINAL  2013    done while bladder surgery being done    [address]      2013 with hysterectomy    THYROIDECTOMY      2013    UPPER GASTROINTESTINAL ENDOSCOPY N/A 04/28/2021    Dr Dos Santos-Nonbleeding 8 mm distal esophageal ulcer secondary to GERD, 1.2 cm antral ulcer, 1 cm inflammatory antral polyp-HP, gastritis       Social History:    Social History     Tobacco Use    Smoking status: Never Smoker    Smokeless tobacco: Never Used   Substance Use Topics    Alcohol use: Not Currently                                Counseling given: Not Answered      Vital Signs (Current):   Vitals:    06/25/21 0932   BP: 131/76   Pulse: 74   Resp: 18   Temp: 97.8 °F (36.6 °C)   TempSrc: Temporal   SpO2: 97%   Weight: 180 lb (81.6 kg)   Height: 5' 2\" (1.575 m)                                              BP Readings from Last 3 Encounters:   06/25/21 131/76   06/07/21 118/78   05/28/21 120/70       NPO Status: Time of last liquid consumption: 0600                        Time of last solid consumption: 1700                        Date of last liquid consumption: 06/25/21                        Date of last solid food consumption: 06/23/21    BMI:   Wt Readings from Last 3 Encounters:   06/25/21 180 lb (81.6 kg)   06/07/21 175 lb (79.4 kg)   05/28/21 179 lb (81.2 kg)     Body mass index is 32.92 kg/m².     CBC:   Lab Results   Component Value Date    WBC 5.3 05/28/2021    RBC 3.35 05/28/2021    HGB 9.3 05/28/2021    HCT 30.1 05/28/2021    MCV 89.9 05/28/2021    RDW 14.3 05/28/2021     05/28/2021       CMP:   Lab Results   Component Value Date     04/30/2021    K 3.6 04/30/2021     04/30/2021    CO2 25 04/30/2021    BUN 9 04/30/2021    CREATININE 0.7 04/30/2021    GFRAA >59 04/30/2021    LABGLOM >60 04/30/2021    GLUCOSE 80 04/30/2021    PROT 5.8 04/27/2021    PROT 6.8 10/09/2012    CALCIUM 7.4 04/30/2021    BILITOT <0.2 04/27/2021    ALKPHOS 50 04/27/2021    AST 17 04/27/2021    ALT 7 04/27/2021       POC Tests: No results for input(s): POCGLU, POCNA, POCK, POCCL, POCBUN, POCHEMO, POCHCT in the last 72 hours. Coags:   Lab Results   Component Value Date    PROTIME 19.2 04/29/2021    INR 1.60 04/29/2021    APTT 32.0 04/27/2021       HCG (If Applicable): No results found for: PREGTESTUR, PREGSERUM, HCG, HCGQUANT     ABGs: No results found for: PHART, PO2ART, MZK0AHH, GZK3WUH, BEART, C7QIAHME     Type & Screen (If Applicable):  No results found for: LABABO, LABRH    Drug/Infectious Status (If Applicable):  No results found for: HIV, HEPCAB    COVID-19 Screening (If Applicable):   Lab Results   Component Value Date    COVID19 Not Detected 04/27/2021           Anesthesia Evaluation    Airway: Mallampati: II  TM distance: >3 FB   Neck ROM: full  Mouth opening: > = 3 FB Dental:          Pulmonary: breath sounds clear to auscultation                             Cardiovascular:            Rhythm: regular  Rate: normal                    Neuro/Psych:               GI/Hepatic/Renal:             Endo/Other:                     Abdominal:       Abdomen: soft. Vascular: Other Findings:             Anesthesia Plan      MAC     ASA 3       Induction: intravenous. Anesthetic plan and risks discussed with patient.                       IMELDA Calderón CRNA   6/25/2021

## 2021-06-25 NOTE — OP NOTE
Endoscopic Procedure Note    Patient: Carmella Monreal : 1955  [facility] Rec#: [id number] Acc#: [id number]     Primary Care Provider Rc Tomlinson    Endoscopist: Tan Rush MD, MD    Date of Procedure:  2021    Procedure:   1. EGD with cold biopsies    Indications: For both EGD and colonoscopy exams today:  1. Hospital discharge follow-up      2. Ulcer of esophagus without bleeding               3. Gastric ulcer without hemorrhage or perforation, unspecified chronicity               4. Chronic diarrhea     5. Anemia, unspecified type               6. Personal history of colonic polyps       Anesthesia:  Sedation was administered by anesthesia who monitored the patient during the procedure. Estimated Blood Loss: minimal    Procedure:   After reviewing the patient's chart and obtaining informed consent, the patient was placed in the left lateral decubitus position. A forward-viewing Olympus endoscope was lubricated and inserted through the mouth into the oropharynx. Under direct visualization, the upper esophagus was intubated. The scope was advanced to the level of the third portion of duodenum. Scope was slowly withdrawn with careful inspection of the mucosal surfaces. The scope was retroflexed for inspection of the gastric fundus and incisura. Findings and maneuvers are listed in impression below. The patient tolerated the procedure well. The scope was removed. There were no immediate complications. Findings/IMPRESSION:  Esophagus: abnormal: Normal upper two thirds; short segment Jesus's Esophagus like changes in the lower third with 1-1.5 cm segments of tongue like projections of gastric appearing mucosa into the lower third of the esophagus away from the EG junction at 37 cm where a distal esophageal ring also was noted. Cold biopsies were taken to check for intestinal metaplasia/Jesus's esophagus.   Since the patient has no complaints of dysphagia, no attempts were made to

## 2021-06-25 NOTE — ANESTHESIA POSTPROCEDURE EVALUATION
Department of Anesthesiology  Postprocedure Note    Patient: Christine Horn  MRN: [id number]  YOB: 1955  Date of evaluation: 6/25/2021  Time:  11:37 AM     Procedure Summary     Date: 06/25/21 Room / Location: [address]    Anesthesia Start: 3808 Anesthesia Stop: 1134    Procedures:       EGD BIOPSY (N/A Abdomen)      COLONOSCOPY POLYPECTOMY SNARE/COLD BIOPSY (N/A ) Diagnosis: (RECHECK ULCERS, ANEMIA,HX OF POLYPS, DIARRHEA)    Surgeons: Sacha Jamil MD Responsible Provider: IMELDA Daniel CRNA    Anesthesia Type: MAC ASA Status: 3          Anesthesia Type: MAC    Naveed Phase I:      Naveed Phase II:      Last vitals: Reviewed and per EMR flowsheets.        Anesthesia Post Evaluation    Patient location during evaluation: bedside  Level of consciousness: awake and alert  Pain score: 0  Airway patency: patent  Nausea & Vomiting: no nausea and no vomiting  Complications: no  Cardiovascular status: blood pressure returned to baseline  Respiratory status: acceptable  Hydration status: euvolemic

## 2021-06-27 PROBLEM — Z09 HOSPITAL DISCHARGE FOLLOW-UP: Status: RESOLVED | Noted: 2021-05-28 | Resolved: 2021-06-27

## 2021-07-22 ENCOUNTER — TELEPHONE (OUTPATIENT)
Dept: GASTROENTEROLOGY | Age: 66
End: 2021-07-22

## 2021-07-22 NOTE — TELEPHONE ENCOUNTER
Per [facility] office notes on 5/28/21--Sent pt home with diatherix stool kit to r/o infectious etiology    7/22/21       LVM for pt to bring that back before her appt so we can sent it off to get results to go over w/her.    @ 4:32pm  Pt called me telling me she messed up the first one, so she will need to come by to  another one. I told her it would be up front for her.

## 2021-07-29 ENCOUNTER — TELEPHONE (OUTPATIENT)
Dept: GASTROENTEROLOGY | Age: 66
End: 2021-07-29

## 2021-08-05 ENCOUNTER — TELEPHONE (OUTPATIENT)
Dept: GASTROENTEROLOGY | Age: 66
End: 2021-08-05

## 2021-08-05 ENCOUNTER — OFFICE VISIT (OUTPATIENT)
Dept: GASTROENTEROLOGY | Age: 66
End: 2021-08-05
Payer: MEDICARE

## 2021-08-05 VITALS
WEIGHT: 185 LBS | SYSTOLIC BLOOD PRESSURE: 118 MMHG | OXYGEN SATURATION: 98 % | BODY MASS INDEX: 34.04 KG/M2 | DIASTOLIC BLOOD PRESSURE: 64 MMHG | HEIGHT: 62 IN | HEART RATE: 75 BPM

## 2021-08-05 DIAGNOSIS — D64.9 ANEMIA, UNSPECIFIED TYPE: Primary | ICD-10-CM

## 2021-08-05 DIAGNOSIS — K52.9 CHRONIC DIARRHEA: ICD-10-CM

## 2021-08-05 PROCEDURE — G8400 PT W/DXA NO RESULTS DOC: HCPCS | Performed by: NURSE PRACTITIONER

## 2021-08-05 PROCEDURE — G8417 CALC BMI ABV UP PARAM F/U: HCPCS | Performed by: NURSE PRACTITIONER

## 2021-08-05 PROCEDURE — 1090F PRES/ABSN URINE INCON ASSESS: CPT | Performed by: NURSE PRACTITIONER

## 2021-08-05 PROCEDURE — 1123F ACP DISCUSS/DSCN MKR DOCD: CPT | Performed by: NURSE PRACTITIONER

## 2021-08-05 PROCEDURE — 99214 OFFICE O/P EST MOD 30 MIN: CPT | Performed by: NURSE PRACTITIONER

## 2021-08-05 PROCEDURE — 4040F PNEUMOC VAC/ADMIN/RCVD: CPT | Performed by: NURSE PRACTITIONER

## 2021-08-05 PROCEDURE — G8427 DOCREV CUR MEDS BY ELIG CLIN: HCPCS | Performed by: NURSE PRACTITIONER

## 2021-08-05 PROCEDURE — 1036F TOBACCO NON-USER: CPT | Performed by: NURSE PRACTITIONER

## 2021-08-05 PROCEDURE — 3017F COLORECTAL CA SCREEN DOC REV: CPT | Performed by: NURSE PRACTITIONER

## 2021-08-05 ASSESSMENT — ENCOUNTER SYMPTOMS
SHORTNESS OF BREATH: 1
SORE THROAT: 0
COUGH: 0
DIARRHEA: 1
TROUBLE SWALLOWING: 0
ABDOMINAL DISTENTION: 0
RECTAL PAIN: 0
VOICE CHANGE: 0
BLOOD IN STOOL: 0
NAUSEA: 0
BACK PAIN: 0
ANAL BLEEDING: 0
CONSTIPATION: 0
ABDOMINAL PAIN: 0
VOMITING: 0

## 2021-08-05 NOTE — TELEPHONE ENCOUNTER
Dr Cari Vega, pt just had EGD/colon. She was recently hospitalized for anemia and she was instructed to hold her coumadin til she has the outpatient scopes. Scopes showed no active bleeding and previous ulcers were healed. I wanted to confirm with you its okay for her to start back on the coumadin.  thanks

## 2021-08-05 NOTE — TELEPHONE ENCOUNTER
Please let pt know I have reviewed the results of her CBC, her hemoglobin has dropped a little when I checked it prior to scopes. She needs a small bowel xray and if stable will need an M2A. Order placed.

## 2021-08-05 NOTE — TELEPHONE ENCOUNTER
Ekta, please reach out to [facility] and let her know its okay to resume her coumadin.  thanks

## 2021-08-05 NOTE — PROGRESS NOTES
Subjective:      Vinicio Pitts is a75 y.o. female  Chief Complaint   Patient presents with    Follow-up       HPI  PCP: Colette Campos  Referring Provider: Dr Carri Beyer MD  Pt made an appt s/p EGD/colonoscopy. To discuss results. Denies post-procedural complications. Scope results in history. We discussed everything. No etiology discovered for the anemia. Pt continues to deny any visible source of GI blood loss. Endoscopically there was appearance of possible barretts however this was not confirmed with path so it was recommended she have another EGD in 3 yrs for evaluation of this. She reports she is still having diarrhea. 1-2 diarrhea stools a day. Diatherix stool testing 7/2021 was negative for infectious etiology. Has not tried anything to help with the diarrhea. Family HX:    Pt denies family hx of colon polyps, colon CA, inflammatory bowel dx, gastric CA and esophageal CA.     Past Medical History:   Diagnosis Date    Chronic back pain     pain management    Depression     Factor 5 Leiden mutation, heterozygous ([facility] [address]     Gastric ulcer     GI bleed     History of blood transfusion     Hx of blood clots     Nasal fracture     closed; no surgery or reduction needed    Pulmonary embolism ([facility] [address]     Thyroid disease     Warfarin anticoagulation           Past Surgical History:   Procedure Laterality Date    BLADDER SURGERY      BLADDER SUSPENSION      2013, done with hysterectomy    COLONOSCOPY  approx 2017    [address] [facility]:  \"polyps repeat in 1 yr\" per pt recall    COLONOSCOPY N/A 06/25/2021    Dr Nyasia Chaidez, polyp, redundant tortuous colon, int hemorrhoids, 5 year recall   [address], VAGINAL  2013    done while bladder surgery being done    [address]      2013 with hysterectomy    THYROIDECTOMY      2013    UPPER GASTROINTESTINAL ENDOSCOPY N/A 04/28/2021    Dr Dos Santos-Nonbleeding 8 mm Emotionally Abused:     Physically Abused:     Sexually Abused: Allergies   Allergen Reactions    Bacitracin-Polymyxin B Hives, Itching and Rash    Macrobid [Nitrofurantoin] Itching and Rash    Neomycin-Bacitracin-Polymyxin [Bacitracin-Neomycin-Polymyxin] Hives, Itching and Rash    Sulfa Antibiotics Hives, Itching and Rash       Current Outpatient Medications   Medication Sig Dispense Refill    Cholecalciferol (VITAMIN D3) 50 MCG (2000 UT) CAPS Take 1,000 Units by mouth 2 times daily      potassium chloride (KLOR-CON M) 20 MEQ extended release tablet 2 times daily       simvastatin (ZOCOR) 40 MG tablet TAKE 1 TABLET BY MOUTH ONCE DAILY      EUTHYROX 88 MCG tablet TAKE 1 TABLET BY MOUTH ONCE DAILY      calcium carbonate (TUMS) 500 MG chewable tablet Take 3 tablets by mouth 2 times daily       pantoprazole (PROTONIX) 40 MG tablet Take 1 tablet by mouth 2 times daily (before meals) for 180 doses 60 tablet 2    sucralfate (CARAFATE) 1 GM tablet Take 1 tablet by mouth 3 times daily (before meals) (Patient taking differently: Take 1 g by mouth 2 times daily ) 90 tablet 2    Triamterene-HCTZ (MAXZIDE PO) Take 37.5 mg by mouth Pt takes 37.5/25 mg daily      FLUoxetine (PROZAC) 20 MG capsule Take 20 mg by mouth daily      pregabalin (LYRICA) 50 MG capsule Take 50 mg by mouth 2 times daily.  HYDROcodone-acetaminophen (NORCO) 7.5-325 MG per tablet Take 1 tablet by mouth every 6 hours as needed for Pain.  ondansetron (ZOFRAN) 4 MG tablet TAKE 1 TABLET 30 MIN BEFORE EACH DOSE OF BOWEL PREP. THEN EVERY 6 HOURS A NEEDED (Patient not taking: Reported on 8/5/2021) 6 tablet 0     No current facility-administered medications for this visit. Review of Systems   Constitutional: Positive for fatigue. Negative for appetite change, fever and unexpected weight change. HENT: Negative for sore throat, trouble swallowing and voice change. Respiratory: Positive for shortness of breath (at times).

## 2021-08-09 ENCOUNTER — HOSPITAL ENCOUNTER (OUTPATIENT)
Dept: GENERAL RADIOLOGY | Age: 66
Discharge: HOME | End: 2021-08-09
Payer: MEDICARE

## 2021-08-09 DIAGNOSIS — D64.9 ANEMIA, UNSPECIFIED TYPE: ICD-10-CM

## 2021-08-09 PROCEDURE — 74250 X-RAY XM SM INT 1CNTRST STD: CPT

## 2021-08-10 ENCOUNTER — TELEPHONE (OUTPATIENT)
Dept: GASTROENTEROLOGY | Age: 66
End: 2021-08-10

## 2021-08-10 NOTE — TELEPHONE ENCOUNTER
Please let Valentine Torres know the small bowel xray did not show any areas of narrowing. Proceed with M2A as planned. Thanks!

## 2021-08-12 NOTE — TELEPHONE ENCOUNTER
8/12/21   Pt has been notified of results and  recommendations. Pt is aware she will be call to scheduled once it has been approved by insurance.     Sending the msg to [id number]

## 2021-08-18 DIAGNOSIS — D64.9 ANEMIA, UNSPECIFIED TYPE: ICD-10-CM

## 2021-08-18 DIAGNOSIS — Z79.01 WARFARIN ANTICOAGULATION: ICD-10-CM

## 2021-08-18 DIAGNOSIS — D68.51 FACTOR V LEIDEN MUTATION (HCC): Primary | ICD-10-CM

## 2021-08-18 NOTE — PROGRESS NOTES
OP HEMATOLOGY/ONCOLOGY CONSULTATION      Pt Name: Bk Him: 1955  MRN: [id number]  Referring provider: Cheko Cordero NP   PCP: Marvin Arugelles MD      Date of evaluation: 8/19/2021   History Obtained From:  patient, spouse Radha Akins, electronic medical record    CHIEF COMPLAINT:    Chief Complaint   Patient presents with    New Patient     Iron Deficiency      HISTORY OF PRESENT ILLNESS:    Maximo Johnson is a 77 y.o.  female referred by IMELDA Cruz for persistent anemia. Jan Underwood presented to [facility] earlier this year with a syncopal episode. She was also noted to have been experiencing melena x4 days. Prior to the admission however she reports that she was feeling more dyspnea with exertion, fatigue for a couple of months. CBC 4/27/2021 revealed a WBC of 9 with 80% PMN, 14.5% lymphocyte, 4.7% monocyte, Hgb 9.2, MCV 91.5, ,000    Hgb dropped to 6.6 on 4/27/2021. Serology 4/27/2021  Serum Fe - 122  TIBC - 303  Fe sat - 40%  Ferritin - 33.5  Retic - 2.03% with absolute 0.0554  Haptoglobin - 250    CMP - crt 0.8 GFR > 60, total protein low, total bilirubin normal < 0.2    She had a history of heterozygous factor V Leiden, pulmonary embolus and was on warfarin. Dr. Elda Overton performed an endoscopy on 4/28/2021 as inpatient at [facility] which revealed  · Nonbleeding 8 mm distal esophageal ulcer secondary to GERD  · 1.2 cm antral ulcer, origin of GI bleed which had subsided  · 1 cm inflammatory antral polyp (hyperplastic gastric mucosa with reactive features)    She did receive transfusions of 3 unit packed red blood cell  --  2 separate occasions during her hospitalization. Dr. Herminia Nick placed an IVC filter on 4/28/2021.         Endoscopy per Dr. Emily Chanel 6/25/2021 revealed  · Abnormal lower third esophagus with 1 to 1.5 cm segments of tonguelike projections (intestinal metaplasia not seen)  · Distal esophageal ring  · 3 to 4 cm in length hiatal hernia without erosions  · Multiple, small, sessile 3 to 4 mm diameter gastric fundic and body polyps    Colonoscopy per Dr. Frederick Rea 6/25/2021 revealed  8 mm diameter sessile hepatic polyp (tubular adenoma)    Kamtia Burciaga reports that she is taking Ferrex 150 daily and has been doing this for 3 months. She denies any GI upset with the iron replacement. She denies any melena or any obvious GI bleeding at present. She continues to have fatigue. M2A capsule is to be performed    She does have hypothyroidism and is on Synthroid. She has a history of DVT, heterozygous factor V Leiden, pulmonary embolus and was previously on warfarin but that was discontinued with IVC filter placed when she had her acute GI blood loss. Dr. Madisyn Hilton does plan on removing IVC filter once GI blood loss has dissipated. She denies taking any nonsteroidals or aspirin at present. She was previously on Protonix 40 mg twice daily and Carafate 4 times daily. She had Protonix decreased to once a day along with Carafate decreased to twice a day after follow-up GI evaluation on 6/25/2021. Now she is off the Carafate altogether and only on Protonix 40 daily. She has been having issues with hypocalcemia and is on 3 Tums twice a day. She is also on potassium replacement for hypokalemia. Blood pressure is fairly stable on her Maxide. She takes Zocor daily.       Past Medical History:   Diagnosis Date    Chronic back pain     pain management    Depression     Factor 5 Leiden mutation, heterozygous ([address]     Gastric ulcer     GI bleed     History of blood transfusion     Hx of blood clots     Nasal fracture     closed; no surgery or reduction needed    Pulmonary embolism ([address]     Thyroid disease     Warfarin anticoagulation        Past Surgical History:   Procedure Laterality Date    BLADDER SURGERY      BLADDER SUSPENSION      2013, done with hysterectomy    COLONOSCOPY  approx 2017    [address] [facility]: \"polyps repeat in 1 yr\" per pt recall    COLONOSCOPY N/A 06/25/2021    Dr Jared Castellanos, polyp, redundant tortuous colon, int hemorrhoids, 5 year recall   [address], VAGINAL  2013    done while bladder surgery being done    IVC [address]      2013 with hysterectomy    THYROIDECTOMY      2013    UPPER GASTROINTESTINAL ENDOSCOPY N/A 04/28/2021    Dr Dos Santos-Nonbleeding 8 mm distal esophageal ulcer secondary to GERD, 1.2 cm antral ulcer, 1 cm inflammatory antral polyp-HP, gastritis    UPPER GASTROINTESTINAL ENDOSCOPY N/A 06/25/2021    Dr Beata Osei- 3-4cm HH, esophagitis , gastric polyps; NEG h pylori, NEG barretts (however had the endoscopic appearance , so pt was given a 3 yr recall for re-evaluation)         Current Outpatient Medications:     iron polysaccharides (FERREX 150) 150 MG capsule, Take 1 capsule by mouth 2 times daily, Disp: 60 capsule, Rfl: 3    Cholecalciferol (VITAMIN D3) 50 MCG (2000 UT) CAPS, Take 1,000 Units by mouth 2 times daily, Disp: , Rfl:     potassium chloride (KLOR-CON M) 20 MEQ extended release tablet, 2 times daily , Disp: , Rfl:     simvastatin (ZOCOR) 40 MG tablet, TAKE 1 TABLET BY MOUTH ONCE DAILY, Disp: , Rfl:     EUTHYROX 88 MCG tablet, TAKE 1 TABLET BY MOUTH ONCE DAILY, Disp: , Rfl:     calcium carbonate (TUMS) 500 MG chewable tablet, Take 3 tablets by mouth 2 times daily , Disp: , Rfl:     pantoprazole (PROTONIX) 40 MG tablet, Take 1 tablet by mouth 2 times daily (before meals) for 180 doses, Disp: 60 tablet, Rfl: 2    ondansetron (ZOFRAN) 4 MG tablet, TAKE 1 TABLET 30 MIN BEFORE EACH DOSE OF BOWEL PREP. THEN EVERY 6 HOURS A NEEDED, Disp: 6 tablet, Rfl: 0    Triamterene-HCTZ (MAXZIDE PO), Take 37.5 mg by mouth Pt takes 37.5/25 mg daily, Disp: , Rfl:     FLUoxetine (PROZAC) 20 MG capsule, Take 20 mg by mouth daily, Disp: , Rfl:     pregabalin (LYRICA) 50 MG capsule, Take 50 mg by mouth 2 times daily.  , Disp: , Rfl:     HYDROcodone-acetaminophen (NORCO) 7.5-325 MG per tablet, Take 1 tablet by mouth every 6 hours as needed for Pain., Disp: , Rfl:      Allergies   Allergen Reactions    Bacitracin-Polymyxin B Hives, Itching and Rash    Macrobid [Nitrofurantoin] Itching and Rash    Neomycin-Bacitracin-Polymyxin [Bacitracin-Neomycin-Polymyxin] Hives, Itching and Rash    Sulfa Antibiotics Hives, Itching and Rash       Social History     Tobacco Use    Smoking status: Never Smoker    Smokeless tobacco: Never Used   Vaping Use    Vaping Use: Never used   Substance Use Topics    Alcohol use: Not Currently    Drug use: Never       Family History   Problem Relation Age of Onset    Breast Cancer Mother          at age 50 years   [employer] Heart Attack Father         when pt 1 months old  of a fatal MI - was at age 64 years    No Known Problems Son          in MVA at age 24 years   [employer] No Known Problems Son     No Known Problems Son     No Known Problems Son     Colon Cancer Neg Hx     Colon Polyps Neg Hx     Esophageal Cancer Neg Hx     Liver Cancer Neg Hx     Rectal Cancer Neg Hx     Stomach Cancer Neg Hx        Subjective   REVIEW OF SYSTEMS:   Review of Systems   Constitutional: Positive for fatigue. Negative for chills, diaphoresis, fever and unexpected weight change. HENT: Negative for mouth sores, nosebleeds, sore throat, trouble swallowing and voice change. Eyes: Negative for photophobia, discharge and itching. Respiratory: Positive for shortness of breath (With exertion). Negative for cough and wheezing. Cardiovascular: Negative for chest pain, palpitations and leg swelling. Gastrointestinal: Negative for abdominal distention, abdominal pain, blood in stool, constipation, diarrhea, nausea and vomiting. Endocrine: Positive for heat intolerance. Negative for cold intolerance, polydipsia and polyuria. Genitourinary: Negative for difficulty urinating, dysuria, hematuria and urgency. Musculoskeletal: Negative for arthralgias, back pain, joint swelling and myalgias. Skin: Negative for color change and rash. Neurological: Negative for dizziness, tremors, seizures, syncope and light-headedness. Hematological: Negative for adenopathy. Does not bruise/bleed easily. Psychiatric/Behavioral: Negative for behavioral problems and suicidal ideas. The patient is nervous/anxious. All other systems reviewed and are negative. Objective   /78   Pulse 75   Ht 5' 1\" (1.549 m)   Wt 184 lb 9.6 oz (83.7 kg)   SpO2 99%   BMI 34.88 kg/m²     PHYSICAL EXAM:  Physical Exam  Constitutional:       Appearance: She is well-developed. HENT:      Head: Normocephalic and atraumatic. Eyes:      General: No scleral icterus. Conjunctiva/sclera: Conjunctivae normal.   Neck:      Trachea: No tracheal deviation. Cardiovascular:      Rate and Rhythm: Normal rate and regular rhythm. Heart sounds: Normal heart sounds. No murmur heard. Pulmonary:      Effort: Pulmonary effort is normal. No respiratory distress. Breath sounds: Normal breath sounds. Abdominal:      General: Bowel sounds are normal. There is no distension. Palpations: Abdomen is soft. Tenderness: There is no abdominal tenderness. Musculoskeletal:         General: No tenderness. Cervical back: Normal range of motion and neck supple. Comments: Full ROM in all 4 extremities   Lymphadenopathy:      Cervical: No cervical adenopathy. Upper Body:      Right upper body: No supraclavicular or axillary adenopathy. Left upper body: No supraclavicular or axillary adenopathy. Lower Body: No right inguinal adenopathy. No left inguinal adenopathy. Skin:     General: Skin is warm and dry. Findings: No rash. Neurological:      Mental Status: She is alert and oriented to person, place, and time.       Coordination: Coordination normal.   Psychiatric:         Behavior: Behavior normal. Thought Content: Thought content normal.     CBC reviewed by me  Lab Results   Component Value Date    WBC 4.41 08/19/2021    HGB 9.9 (L) 08/19/2021    HCT 34.6 08/19/2021    MCV 88.3 08/19/2021     08/19/2021     Lab Results   Component Value Date    NEUTROABS 3.05 08/19/2021       VISIT DIAGNOSES  1. Normocytic hypochromic anemia    2. Acute GI bleeding    3. Chronic anticoagulation    4. Heterozygous factor V Leiden mutation ([address]    5. History of pulmonary embolus (PE)    6. History of recurrent deep vein thrombosis (DVT)        ASSESSMENT/PLAN:    1. Normocytic hypochromic anemia with prior acute GI blood loss from gastric ulcer    Umang Cobos presented to [facility] earlier this year with a syncopal episode. She was also noted to have been experiencing melena x4 days. Prior to the admission however she reports that she was feeling more dyspnea with exertion, fatigue for a couple of months. CBC 4/27/2021 revealed a WBC of 9 with 80% PMN, 14.5% lymphocyte, 4.7% monocyte, Hgb 9.2, MCV 91.5, ,000    Hgb dropped to 6.6 on 4/27/2021. Serology 4/27/2021  Serum Fe - 122  TIBC - 303  Fe sat - 40%  Ferritin - 33.5  Retic - 2.03% with absolute 0.0554  Haptoglobin - 250    CMP - crt 0.8 GFR > 60, total protein low, total bilirubin normal < 0.2    She had a history of heterozygous factor V Leiden, pulmonary embolus and was on warfarin. Dr. Danielito Munroe performed an endoscopy on 4/28/2021 as inpatient at [facility] which revealed  · Nonbleeding 8 mm distal esophageal ulcer secondary to GERD  · 1.2 cm antral ulcer, origin of GI bleed which had subsided  · 1 cm inflammatory antral polyp (hyperplastic gastric mucosa with reactive features)    She did receive transfusions of 3 unit packed red blood cell  --  2 separate occasions during her hospitalization. Dr. Elida Denton placed an IVC filter on 4/28/2021.         Endoscopy per Dr. Tin Cunningham 6/25/2021 revealed  · Abnormal lower third esophagus with 1 to 1.5 cm segments of tonguelike projections (intestinal metaplasia not seen)  · Distal esophageal ring  · 3 to 4 cm in length hiatal hernia without erosions  · Multiple, small, sessile 3 to 4 mm diameter gastric fundic and body polyps    Colonoscopy per Dr. Tin Cunningham 6/25/2021 revealed  8 mm diameter sessile hepatic polyp (tubular adenoma)    Umang Cobos reports that she is taking Ferrex 150 daily and has been doing this for 3 months. She denies any GI upset with the iron replacement. She denies any melena or any obvious GI bleeding at present. She continues to have fatigue. M2A capsule is to be performed    She was previously on Protonix 40 mg twice daily and Carafate 4 times daily. She had Protonix decreased to once a day along with Carafate decreased to twice a day after follow-up GI evaluation on 6/25/2021. Now she is off the Carafate altogether and only on Protonix 40 daily. She has a history of DVT, heterozygous factor V Leiden, pulmonary embolus and was previously on warfarin but that was discontinued with IVC filter placed when she had her acute GI blood loss. Dr. Elida Denton does plan on removing IVC filter once GI blood loss has dissipated. She denies taking any nonsteroidals or aspirin at present. CBC today reveals WBC of 4.41 with 69.2% PMN, 19.5% lymphocyte, 7.7% monocyte, 1.6% eosinophil, 2% basophil. Hgb 9.9, MCV 88.3, ,000. I discussed the fact that her Hgb has continued to slowly improve up to 9.9. I encouraged her to complete the M2A capsule. I am rechecking her iron panel with other serology today. I discussed potential need of IV iron replacement if iron panel is still low. I also discussed the potential need for bone marrow aspiration and biopsy if iron panel is stable, there is no noted acute GI blood loss, and other serology is unrevealing. All questions were answered to Umang Cobos and Wesley Portillo satisfaction today.     2.  Mild basophilia    WBC is 4.41 with 2% basophilsotherwise differential normal.  I will recheck this next visit. · Breast cancer screening. Bilateral screening mammogram at [facility] on 11/2/2020 was BI-RADS 2    · Colon cancer screening. Colonoscopy per Dr. Nicole Skelton 6/25/2021 revealed 8 mm diameter sessile hepatic polyp (tubular adenoma)    · Cervical/GYN cancer screening. Prior hysterectomy      Immunization History   Administered Date(s) Administered    COVID-19, Moderna, PF, 100mcg/0.5mL 12/30/2020, 01/26/2021    Influenza, High Dose (Fluzone 65 yrs and older) 10/15/2020    Tdap (Boostrix, Adacel) 04/27/2021           Orders Placed This Encounter   Procedures    Lactate Dehydrogenase    Ferritin    Iron and TIBC    Vitamin B12    Folate    Electrophoresis Protein, Serum with Reflex to Immunofixation    [place]/Lambda Free Lt Chains, Serum Quant        Return in about 6 weeks (around 9/30/2021) for With [place].       Suleiman Earl PA-C  12:22 PM  8/19/2021

## 2021-08-19 ENCOUNTER — HOSPITAL ENCOUNTER (OUTPATIENT)
Dept: INFUSION THERAPY | Age: 66
Discharge: HOME | End: 2021-08-19
Payer: MEDICARE

## 2021-08-19 ENCOUNTER — OFFICE VISIT (OUTPATIENT)
Dept: HEMATOLOGY | Age: 66
End: 2021-08-19
Payer: MEDICARE

## 2021-08-19 VITALS
WEIGHT: 184.6 LBS | HEART RATE: 75 BPM | DIASTOLIC BLOOD PRESSURE: 78 MMHG | OXYGEN SATURATION: 99 % | HEIGHT: 61 IN | BODY MASS INDEX: 34.85 KG/M2 | SYSTOLIC BLOOD PRESSURE: 132 MMHG

## 2021-08-19 DIAGNOSIS — D68.51 FACTOR V LEIDEN MUTATION (HCC): ICD-10-CM

## 2021-08-19 DIAGNOSIS — D50.9 IRON DEFICIENCY ANEMIA, UNSPECIFIED IRON DEFICIENCY ANEMIA TYPE: ICD-10-CM

## 2021-08-19 DIAGNOSIS — Z86.711 HISTORY OF PULMONARY EMBOLUS (PE): ICD-10-CM

## 2021-08-19 DIAGNOSIS — K92.2 ACUTE GI BLEEDING: ICD-10-CM

## 2021-08-19 DIAGNOSIS — Z86.718 HISTORY OF RECURRENT DEEP VEIN THROMBOSIS (DVT): ICD-10-CM

## 2021-08-19 DIAGNOSIS — K90.49 MALABSORPTION DUE TO INTOLERANCE, NOT ELSEWHERE CLASSIFIED: ICD-10-CM

## 2021-08-19 DIAGNOSIS — Z79.01 WARFARIN ANTICOAGULATION: ICD-10-CM

## 2021-08-19 DIAGNOSIS — D50.9 NORMOCYTIC HYPOCHROMIC ANEMIA: Primary | ICD-10-CM

## 2021-08-19 DIAGNOSIS — D50.9 NORMOCYTIC HYPOCHROMIC ANEMIA: ICD-10-CM

## 2021-08-19 DIAGNOSIS — D64.9 ANEMIA, UNSPECIFIED TYPE: ICD-10-CM

## 2021-08-19 DIAGNOSIS — Z79.01 CHRONIC ANTICOAGULATION: ICD-10-CM

## 2021-08-19 DIAGNOSIS — D68.51 HETEROZYGOUS FACTOR V LEIDEN MUTATION (HCC): ICD-10-CM

## 2021-08-19 LAB
BASOPHILS ABSOLUTE: 0.09 K/UL (ref 0.01–0.08)
BASOPHILS RELATIVE PERCENT: 2 % (ref 0.1–1.2)
EOSINOPHILS ABSOLUTE: 0.07 K/UL (ref 0.04–0.54)
EOSINOPHILS RELATIVE PERCENT: 1.6 % (ref 0.7–7)
FERRITIN: 13.6 NG/ML (ref 11.1–264)
FOLATE: 6.5 NG/ML (ref 2.7–20)
HCT VFR BLD CALC: 34.6 % (ref 34.1–44.9)
HEMOGLOBIN: 9.9 G/DL (ref 11.2–15.7)
IRON SATURATION: 14 % (ref 14–50)
IRON: 39 UG/DL (ref 37–170)
LACTATE DEHYDROGENASE: 625 U/L (ref 313–618)
LYMPHOCYTES ABSOLUTE: 0.86 K/UL (ref 1.18–3.74)
LYMPHOCYTES RELATIVE PERCENT: 19.5 % (ref 19.3–53.1)
MCH RBC QN AUTO: 25.3 PG (ref 25.6–32.2)
MCHC RBC AUTO-ENTMCNC: 28.6 G/DL (ref 32.3–35.5)
MCV RBC AUTO: 88.3 FL (ref 79.4–94.8)
MONOCYTES ABSOLUTE: 0.34 K/UL (ref 0.24–0.82)
MONOCYTES RELATIVE PERCENT: 7.7 % (ref 4.7–12.5)
NEUTROPHILS ABSOLUTE: 3.05 K/UL (ref 1.56–6.13)
NEUTROPHILS RELATIVE PERCENT: 69.2 % (ref 34–71.1)
PDW BLD-RTO: 15.5 % (ref 11.7–14.4)
PLATELET # BLD: 203 K/UL (ref 182–369)
PMV BLD AUTO: 9.7 FL (ref 7.4–10.4)
RBC # BLD: 3.92 M/UL (ref 3.93–5.22)
TOTAL IRON BINDING CAPACITY: 270 UG/DL (ref 265–497)
VITAMIN B-12: 408 PG/ML (ref 239–931)
WBC # BLD: 4.41 K/UL (ref 3.98–10.04)

## 2021-08-19 PROCEDURE — G8417 CALC BMI ABV UP PARAM F/U: HCPCS | Performed by: PHYSICIAN ASSISTANT

## 2021-08-19 PROCEDURE — G8400 PT W/DXA NO RESULTS DOC: HCPCS | Performed by: PHYSICIAN ASSISTANT

## 2021-08-19 PROCEDURE — 3017F COLORECTAL CA SCREEN DOC REV: CPT | Performed by: PHYSICIAN ASSISTANT

## 2021-08-19 PROCEDURE — 4040F PNEUMOC VAC/ADMIN/RCVD: CPT | Performed by: PHYSICIAN ASSISTANT

## 2021-08-19 PROCEDURE — 82607 VITAMIN B-12: CPT

## 2021-08-19 PROCEDURE — 85025 COMPLETE CBC W/AUTO DIFF WBC: CPT

## 2021-08-19 PROCEDURE — 99204 OFFICE O/P NEW MOD 45 MIN: CPT | Performed by: PHYSICIAN ASSISTANT

## 2021-08-19 PROCEDURE — 1036F TOBACCO NON-USER: CPT | Performed by: PHYSICIAN ASSISTANT

## 2021-08-19 PROCEDURE — 36415 COLL VENOUS BLD VENIPUNCTURE: CPT

## 2021-08-19 PROCEDURE — 99212 OFFICE O/P EST SF 10 MIN: CPT

## 2021-08-19 PROCEDURE — 83540 ASSAY OF IRON: CPT

## 2021-08-19 PROCEDURE — 82728 ASSAY OF FERRITIN: CPT

## 2021-08-19 PROCEDURE — 1090F PRES/ABSN URINE INCON ASSESS: CPT | Performed by: PHYSICIAN ASSISTANT

## 2021-08-19 PROCEDURE — 82746 ASSAY OF FOLIC ACID SERUM: CPT

## 2021-08-19 PROCEDURE — 83550 IRON BINDING TEST: CPT

## 2021-08-19 PROCEDURE — G8427 DOCREV CUR MEDS BY ELIG CLIN: HCPCS | Performed by: PHYSICIAN ASSISTANT

## 2021-08-19 PROCEDURE — 1123F ACP DISCUSS/DSCN MKR DOCD: CPT | Performed by: PHYSICIAN ASSISTANT

## 2021-08-19 PROCEDURE — 83615 LACTATE (LD) (LDH) ENZYME: CPT

## 2021-08-19 RX ORDER — DIPHENHYDRAMINE HYDROCHLORIDE 50 MG/ML
50 INJECTION INTRAMUSCULAR; INTRAVENOUS ONCE
Status: CANCELLED | OUTPATIENT
Start: 2021-08-20 | End: 2021-08-20

## 2021-08-19 RX ORDER — EPINEPHRINE 1 MG/ML
0.3 INJECTION, SOLUTION, CONCENTRATE INTRAVENOUS PRN
Status: CANCELLED | OUTPATIENT
Start: 2021-08-20

## 2021-08-19 RX ORDER — SODIUM CHLORIDE 9 MG/ML
INJECTION, SOLUTION INTRAVENOUS CONTINUOUS
Status: CANCELLED | OUTPATIENT
Start: 2021-08-20

## 2021-08-19 RX ORDER — IRON POLYSACCHARIDE COMPLEX 150 MG
150 CAPSULE ORAL 2 TIMES DAILY
Qty: 60 CAPSULE | Refills: 3 | Status: SHIPPED | OUTPATIENT
Start: 2021-08-19 | End: 2021-10-01 | Stop reason: SINTOL

## 2021-08-19 RX ORDER — SODIUM CHLORIDE 9 MG/ML
25 INJECTION, SOLUTION INTRAVENOUS PRN
Status: CANCELLED | OUTPATIENT
Start: 2021-08-20

## 2021-08-19 RX ORDER — METHYLPREDNISOLONE SODIUM SUCCINATE 125 MG/2ML
125 INJECTION, POWDER, LYOPHILIZED, FOR SOLUTION INTRAMUSCULAR; INTRAVENOUS ONCE
Status: CANCELLED | OUTPATIENT
Start: 2021-08-20 | End: 2021-08-20

## 2021-08-19 RX ORDER — HEPARIN SODIUM (PORCINE) LOCK FLUSH IV SOLN 100 UNIT/ML 100 UNIT/ML
500 SOLUTION INTRAVENOUS PRN
Status: CANCELLED | OUTPATIENT
Start: 2021-08-20

## 2021-08-19 RX ORDER — SODIUM CHLORIDE 0.9 % (FLUSH) 0.9 %
5-40 SYRINGE (ML) INJECTION PRN
Status: CANCELLED | OUTPATIENT
Start: 2021-08-20

## 2021-08-19 ASSESSMENT — ENCOUNTER SYMPTOMS
WHEEZING: 0
VOMITING: 0
COLOR CHANGE: 0
BACK PAIN: 0
BLOOD IN STOOL: 0
NAUSEA: 0
ABDOMINAL PAIN: 0
SORE THROAT: 0
EYE DISCHARGE: 0
PHOTOPHOBIA: 0
VOICE CHANGE: 0
COUGH: 0
EYE ITCHING: 0
ABDOMINAL DISTENTION: 0
TROUBLE SWALLOWING: 0
DIARRHEA: 0
SHORTNESS OF BREATH: 1
CONSTIPATION: 0

## 2021-08-24 ENCOUNTER — TELEPHONE (OUTPATIENT)
Dept: GASTROENTEROLOGY | Age: 66
End: 2021-08-24

## 2021-08-24 NOTE — TELEPHONE ENCOUNTER
08-23-21 Per Manda Chavarria MA   M2A Docutmentatiop      Patient presented to the office on 08-23-21@ 8 am as scheduled, for M2A pill cam ingestion and equipment placement. Instructions, risks, and benefits were discussed. All questions were answered and patient acknowledged understanding. Consent was signed and dated. The sensor belt was then placed around the mid abdomen and attached the recorder device to it and placed in shoulder holster. The pill cam was then opened and activated (light on recorder blinking blue). The patient successfully swallowed the pill cam with a small amount of water mixed with simethicone drops. When patient returns, the recorder will be uploaded to the computer and the appointed physician will be notified for the report to be read and resulted. Instructions are as follows:   Clear liquids 2 hours after ingestion of pill cam  Light snack 4 hours after ingestion of pill cam  Avoid any MRI machines  Resume ADL's as normal throughout the day  Watch for Pill cam to be expelled, if not seen in stool further testing may be required   Return to office no later than 4:30pm  Call the office if the blue light stops blinking or all lights disappear [phone].

## 2021-08-26 NOTE — TELEPHONE ENCOUNTER
08-25-21 Dr Enrique Alcantara:  Normal Duodenum,Jejunum and Ileum. NO evidence o nelly recent or active or impending bleeding . NO masses or erosions or ulcers or AVMs were  evident. RECOMMEND:  -Monitor H/H periodically  -GI clinic f/u PRN  -Keep scheduled appointments with her PCP and other MDs  -If her anemia does not improve or resolve, consider Hematology consult.

## 2021-08-26 NOTE — TELEPHONE ENCOUNTER
08-26-21 Results Routed to PCP JAGUAR Goyal.        08-26-21 Called m for the patient to return a call to the office Cv

## 2021-08-31 ENCOUNTER — HOSPITAL ENCOUNTER (OUTPATIENT)
Dept: INFUSION THERAPY | Age: 66
Setting detail: INFUSION SERIES
Discharge: HOME | End: 2021-08-31
Payer: MEDICARE

## 2021-08-31 VITALS
RESPIRATION RATE: 18 BRPM | SYSTOLIC BLOOD PRESSURE: 101 MMHG | HEART RATE: 57 BPM | OXYGEN SATURATION: 100 % | TEMPERATURE: 98.6 F | DIASTOLIC BLOOD PRESSURE: 63 MMHG

## 2021-08-31 DIAGNOSIS — D50.9 IRON DEFICIENCY ANEMIA, UNSPECIFIED IRON DEFICIENCY ANEMIA TYPE: Primary | ICD-10-CM

## 2021-08-31 DIAGNOSIS — K90.49 MALABSORPTION DUE TO INTOLERANCE, NOT ELSEWHERE CLASSIFIED: ICD-10-CM

## 2021-08-31 PROCEDURE — 2580000003 HC RX 258: Performed by: PHYSICIAN ASSISTANT

## 2021-08-31 PROCEDURE — 6360000002 HC RX W HCPCS: Performed by: PHYSICIAN ASSISTANT

## 2021-08-31 PROCEDURE — 96365 THER/PROPH/DIAG IV INF INIT: CPT

## 2021-08-31 RX ORDER — SODIUM CHLORIDE 9 MG/ML
INJECTION, SOLUTION INTRAVENOUS CONTINUOUS
Status: CANCELLED | OUTPATIENT
Start: 2021-09-07

## 2021-08-31 RX ORDER — METHYLPREDNISOLONE SODIUM SUCCINATE 125 MG/2ML
125 INJECTION, POWDER, LYOPHILIZED, FOR SOLUTION INTRAMUSCULAR; INTRAVENOUS ONCE
Status: CANCELLED | OUTPATIENT
Start: 2021-09-07 | End: 2021-09-07

## 2021-08-31 RX ORDER — EPINEPHRINE 1 MG/ML
0.3 INJECTION, SOLUTION, CONCENTRATE INTRAVENOUS PRN
Status: CANCELLED | OUTPATIENT
Start: 2021-09-07

## 2021-08-31 RX ORDER — SODIUM CHLORIDE 0.9 % (FLUSH) 0.9 %
5-40 SYRINGE (ML) INJECTION PRN
Status: CANCELLED | OUTPATIENT
Start: 2021-09-07

## 2021-08-31 RX ORDER — SODIUM CHLORIDE 9 MG/ML
25 INJECTION, SOLUTION INTRAVENOUS PRN
Status: CANCELLED | OUTPATIENT
Start: 2021-09-07

## 2021-08-31 RX ORDER — DIPHENHYDRAMINE HYDROCHLORIDE 50 MG/ML
50 INJECTION INTRAMUSCULAR; INTRAVENOUS ONCE
Status: CANCELLED | OUTPATIENT
Start: 2021-09-07 | End: 2021-09-07

## 2021-08-31 RX ORDER — HEPARIN SODIUM (PORCINE) LOCK FLUSH IV SOLN 100 UNIT/ML 100 UNIT/ML
500 SOLUTION INTRAVENOUS PRN
Status: CANCELLED | OUTPATIENT
Start: 2021-09-07

## 2021-08-31 RX ORDER — SODIUM CHLORIDE 9 MG/ML
INJECTION, SOLUTION INTRAVENOUS CONTINUOUS
Status: ACTIVE | OUTPATIENT
Start: 2021-08-31 | End: 2021-08-31

## 2021-08-31 RX ADMIN — SODIUM CHLORIDE: 9 INJECTION, SOLUTION INTRAVENOUS at 08:16

## 2021-08-31 RX ADMIN — FERRIC CARBOXYMALTOSE INJECTION 750 MG: 50 INJECTION, SOLUTION INTRAVENOUS at 08:31

## 2021-09-08 ENCOUNTER — HOSPITAL ENCOUNTER (OUTPATIENT)
Dept: INFUSION THERAPY | Age: 66
Setting detail: INFUSION SERIES
Discharge: HOME | End: 2021-09-08
Payer: MEDICARE

## 2021-09-08 VITALS
OXYGEN SATURATION: 99 % | RESPIRATION RATE: 18 BRPM | HEART RATE: 58 BPM | TEMPERATURE: 98 F | DIASTOLIC BLOOD PRESSURE: 70 MMHG | SYSTOLIC BLOOD PRESSURE: 126 MMHG

## 2021-09-08 DIAGNOSIS — K90.49 MALABSORPTION DUE TO INTOLERANCE, NOT ELSEWHERE CLASSIFIED: ICD-10-CM

## 2021-09-08 DIAGNOSIS — D50.9 IRON DEFICIENCY ANEMIA, UNSPECIFIED IRON DEFICIENCY ANEMIA TYPE: Primary | ICD-10-CM

## 2021-09-08 PROCEDURE — 96365 THER/PROPH/DIAG IV INF INIT: CPT

## 2021-09-08 PROCEDURE — 2580000003 HC RX 258: Performed by: PHYSICIAN ASSISTANT

## 2021-09-08 PROCEDURE — 6360000002 HC RX W HCPCS: Performed by: PHYSICIAN ASSISTANT

## 2021-09-08 RX ORDER — METHYLPREDNISOLONE SODIUM SUCCINATE 125 MG/2ML
125 INJECTION, POWDER, LYOPHILIZED, FOR SOLUTION INTRAMUSCULAR; INTRAVENOUS ONCE
Status: CANCELLED | OUTPATIENT
Start: 2021-09-08 | End: 2021-09-08

## 2021-09-08 RX ORDER — SODIUM CHLORIDE 9 MG/ML
INJECTION, SOLUTION INTRAVENOUS CONTINUOUS
Status: ACTIVE | OUTPATIENT
Start: 2021-09-08 | End: 2021-09-08

## 2021-09-08 RX ORDER — SODIUM CHLORIDE 9 MG/ML
25 INJECTION, SOLUTION INTRAVENOUS PRN
Status: CANCELLED | OUTPATIENT
Start: 2021-09-08

## 2021-09-08 RX ORDER — HEPARIN SODIUM (PORCINE) LOCK FLUSH IV SOLN 100 UNIT/ML 100 UNIT/ML
500 SOLUTION INTRAVENOUS PRN
Status: CANCELLED | OUTPATIENT
Start: 2021-09-08

## 2021-09-08 RX ORDER — DIPHENHYDRAMINE HYDROCHLORIDE 50 MG/ML
50 INJECTION INTRAMUSCULAR; INTRAVENOUS ONCE
Status: CANCELLED | OUTPATIENT
Start: 2021-09-08 | End: 2021-09-08

## 2021-09-08 RX ORDER — SODIUM CHLORIDE 9 MG/ML
INJECTION, SOLUTION INTRAVENOUS CONTINUOUS
Status: CANCELLED | OUTPATIENT
Start: 2021-09-08

## 2021-09-08 RX ORDER — EPINEPHRINE 1 MG/ML
0.3 INJECTION, SOLUTION, CONCENTRATE INTRAVENOUS PRN
Status: CANCELLED | OUTPATIENT
Start: 2021-09-08

## 2021-09-08 RX ORDER — SODIUM CHLORIDE 0.9 % (FLUSH) 0.9 %
5-40 SYRINGE (ML) INJECTION PRN
Status: CANCELLED | OUTPATIENT
Start: 2021-09-08

## 2021-09-08 RX ADMIN — FERRIC CARBOXYMALTOSE INJECTION 750 MG: 50 INJECTION, SOLUTION INTRAVENOUS at 08:19

## 2021-09-30 NOTE — PROGRESS NOTES
to the admission however she reports that she was feeling more dyspnea with exertion, fatigue for a couple of months.     CBC 4/27/2021 revealed a WBC of 9 with 80% PMN, 14.5% lymphocyte, 4.7% monocyte, Hgb 9.2, MCV 91.5, ,000     Hgb dropped to 6.6 on 4/27/2021.     Serology 4/27/2021  Serum Fe - 122  TIBC - 303  Fe sat - 40%  Ferritin - 33.5  Retic - 2.03% with absolute 0.0554  Haptoglobin - 250     CMP - crt 0.8 GFR > 60, total protein low, total bilirubin normal < 0.2     She had a history of heterozygous factor V Leiden, pulmonary embolus and was on warfarin.     Dr. Evert Councilman performed an endoscopy on 4/28/2021 as inpatient at [address] [place] [address] which revealed  · Nonbleeding 8 mm distal esophageal ulcer secondary to GERD  · 1.2 cm antral ulcer, origin of GI bleed which had subsided  · 1 cm inflammatory antral polyp (hyperplastic gastric mucosa with reactive features)     She did receive transfusions of 3 unit packed red blood cell  --  2 separate occasions during her hospitalization.     Dr. Leslie Larson placed an IVC filter on 4/28/2021.          Endoscopy per Dr. Serjio Camacho 6/25/2021 revealed  · Abnormal lower third esophagus with 1 to 1.5 cm segments of tonguelike projections (intestinal metaplasia not seen)  · Distal esophageal ring  · 3 to 4 cm in length hiatal hernia without erosions  · Multiple, small, sessile 3 to 4 mm diameter gastric fundic and body polyps     Colonoscopy per Dr. Serjio Camacho 6/25/2021 revealed  8 mm diameter sessile hepatic polyp (tubular adenoma)     Greta Pisano reported that she was taking Ferrex 150 daily for 3 months. She denied any GI upset with the iron replacement.     She denied any melena or any obvious GI bleeding at present.     She continued to have fatigue.     M2A capsule is to be performed       Her initial CBC in the office on 8/19/2021 revealed a WBC of 4.41 with 69.2% PMN, 19.5% lymphocyte, 7.7% monocyte, 1.6% eosinophil, 2% basophil.   Hgb 9.9, MCV 88.3, ,000.     I discussed the fact that her Hgb had continued to slowly improve up to 9.9.     M2A capsule completed on 8/23/2021 was NEGATIVE     I discussed potential need of IV iron replacement if iron panel is still low.     I also discussed the potential need for bone marrow aspiration and biopsy if iron panel is stable, there is no noted acute GI blood loss, and other serology is unrevealing. Serology 8/19/2021  Serum Fe - 39  TIBC - 270  Fe sat - 14%  Ferritin - 13.6    B12 - 408  Folate - 6.5    LDH -625 (313-618)    SPEP -no M spike with immunofixation negative  QI - IgG 894, IgA 201, IgM 56  Free serum light chain kappa 20.6 with K/L ratio 1.26    Her iron saturation above was low so I contacted her to see if she wanted to increase her iron to twice a day or take IV iron. She elected parenteral iron replacement.     TREATMENT SUMMARY  Injectafer 750 mg IV on 8/31 and 9/8/2021        Past Medical History:   Diagnosis Date    Chronic back pain     pain management    Depression     [facility] Leiden mutation[facility] ([facility]     Gastric ulcer     GI bleed     History of blood transfusion     Hx of blood clots     Nasal fracture     closed; no surgery or reduction needed    Pulmonary embolism ([address]     Thyroid disease     Warfarin anticoagulation         Past Surgical History:   Procedure Laterality Date    BLADDER SURGERY      BLADDER SUSPENSION      2013, done with hysterectomy    COLONOSCOPY  approx 2017    [address] [facility]:  \"polyps repeat in 1 yr\" per pt recall    COLONOSCOPY N/A 06/25/2021    Dr Sita Tidwell, polyp, redundant tortuous colon, int hemorrhoids, 5 year recall   [address], VAGINAL  2013    done while bladder surgery being done    [address]      2013 with hysterectomy    THYROIDECTOMY      2013    UPPER GASTROINTESTINAL ENDOSCOPY N/A 04/28/2021    Dr Dos Santos-Nonbleeding 8 mm distal esophageal ulcer secondary to GERD, 1.2 cm antral ulcer, 1 cm inflammatory antral polyp-HP, gastritis    UPPER GASTROINTESTINAL ENDOSCOPY N/A 06/25/2021    Dr Dee Oropeza- 3-4cm HH, esophagitis , gastric polyps; NEG h pylori, NEG barretts (however had the endoscopic appearance , so pt was given a 3 yr recall for re-evaluation)           Current Outpatient Medications:     pantoprazole (PROTONIX) 40 MG tablet, Take 1 tablet by mouth every morning (before breakfast), Disp: 30 tablet, Rfl: 9    Cholecalciferol (VITAMIN D3) 50 MCG (2000 UT) CAPS, Take 1,000 Units by mouth 2 times daily, Disp: , Rfl:     potassium chloride (KLOR-CON M) 20 MEQ extended release tablet, 2 times daily , Disp: , Rfl:     simvastatin (ZOCOR) 40 MG tablet, TAKE 1 TABLET BY MOUTH ONCE DAILY, Disp: , Rfl:     EUTHYROX 88 MCG tablet, TAKE 1 TABLET BY MOUTH ONCE DAILY, Disp: , Rfl:     calcium carbonate (TUMS) 500 MG chewable tablet, Take 3 tablets by mouth 2 times daily , Disp: , Rfl:     ondansetron (ZOFRAN) 4 MG tablet, TAKE 1 TABLET 30 MIN BEFORE EACH DOSE OF BOWEL PREP. THEN EVERY 6 HOURS A NEEDED, Disp: 6 tablet, Rfl: 0    Triamterene-HCTZ (MAXZIDE PO), Take 37.5 mg by mouth Pt takes 37.5/25 mg daily, Disp: , Rfl:     FLUoxetine (PROZAC) 20 MG capsule, Take 20 mg by mouth daily, Disp: , Rfl:     pregabalin (LYRICA) 50 MG capsule, Take 50 mg by mouth 2 times daily.  , Disp: , Rfl:     HYDROcodone-acetaminophen (NORCO) 7.5-325 MG per tablet, Take 1 tablet by mouth every 6 hours as needed for Pain., Disp: , Rfl:      Allergies   Allergen Reactions    Bacitracin-Polymyxin B Hives, Itching and Rash    Macrobid [Nitrofurantoin] Itching and Rash    Neomycin-Bacitracin-Polymyxin [Bacitracin-Neomycin-Polymyxin] Hives, Itching and Rash    Sulfa Antibiotics Hives, Itching and Rash       Social History     Tobacco Use    Smoking status: Never Smoker    Smokeless tobacco: Never Used   Vaping Use    Vaping Use: Never used   Substance Use Topics    Alcohol use: Not Currently    Drug use: Never       Family History   Problem Relation Age of Onset   Rossana Balnker Breast Cancer Mother          at age 50 years   Debrhea Blanker Heart Attack Father         when pt 1 months old  of a fatal MI - was at age 64 years    No Known Problems Son          in MVA at age 24 years   Rossana  No Known Problems Son     No Known Problems Son     No Known Problems Son     Colon Cancer Neg Hx     Colon Polyps Neg Hx     Esophageal Cancer Neg Hx     Liver Cancer Neg Hx     Rectal Cancer Neg Hx     Stomach Cancer Neg Hx        Subjective   REVIEW OF SYSTEMS:   Review of Systems   Constitutional: Positive for fatigue (Improved after iron). Negative for chills, diaphoresis, fever and unexpected weight change. HENT: Negative for mouth sores, nosebleeds, sore throat, trouble swallowing and voice change. Eyes: Negative for photophobia, discharge and itching. Respiratory: Negative for cough, shortness of breath and wheezing. Cardiovascular: Negative for chest pain, palpitations and leg swelling. Gastrointestinal: Negative for abdominal distention, abdominal pain, blood in stool, constipation, diarrhea, nausea and vomiting. Endocrine: Positive for heat intolerance. Negative for cold intolerance, polydipsia and polyuria. Genitourinary: Negative for difficulty urinating, dysuria, hematuria and urgency. Musculoskeletal: Negative for arthralgias, back pain, joint swelling and myalgias. Skin: Negative for color change and rash. Neurological: Negative for dizziness, tremors, seizures, syncope and light-headedness. Hematological: Negative for adenopathy. Does not bruise/bleed easily. Psychiatric/Behavioral: Negative for behavioral problems and suicidal ideas. The patient is nervous/anxious. All other systems reviewed and are negative.       Objective   /76   Pulse 75   Ht 5' 1\" (1.549 m)   Wt 186 lb 14.4 oz (84.8 kg)   SpO2 98%   BMI 35.31 kg/m²     PHYSICAL EXAM:  Physical Exam  Constitutional:       Appearance: She is well-developed. HENT:      Head: Normocephalic and atraumatic. Eyes:      General: No scleral icterus. Conjunctiva/sclera: Conjunctivae normal.   Neck:      Trachea: No tracheal deviation. Cardiovascular:      Rate and Rhythm: Normal rate and regular rhythm. Heart sounds: Normal heart sounds. No murmur heard. Pulmonary:      Effort: Pulmonary effort is normal. No respiratory distress. Breath sounds: Normal breath sounds. Abdominal:      General: Bowel sounds are normal. There is no distension. Palpations: Abdomen is soft. Tenderness: There is no abdominal tenderness. Musculoskeletal:         General: No tenderness. Cervical back: Normal range of motion and neck supple. Comments: Full ROM in all 4 extremities   Lymphadenopathy:      Cervical: No cervical adenopathy. Upper Body:      Right upper body: No supraclavicular or axillary adenopathy. Left upper body: No supraclavicular or axillary adenopathy. Lower Body: No right inguinal adenopathy. No left inguinal adenopathy. Skin:     General: Skin is warm and dry. Findings: No rash. Neurological:      Mental Status: She is alert and oriented to person, place, and time. Coordination: Coordination normal.   Psychiatric:         Behavior: Behavior normal.         Thought Content: Thought content normal.     CBC reviewed by me  Lab Results   Component Value Date    WBC 3.85 (L) 10/01/2021    HGB 11.3 10/01/2021    HCT 36.1 10/01/2021    MCV 92.1 10/01/2021     (L) 10/01/2021     Lab Results   Component Value Date    NEUTROABS 2.67 10/01/2021       VISIT DIAGNOSES  1. Iron deficiency anemia due to chronic blood loss    2. Intestinal malabsorption, unspecified type    3. Leukopenia, unspecified type    4. Basophilia        ASSESSMENT/PLAN:    1. Iron deficiency anemia -distal esophagitis/ulcerations.   Normalized with IV iron replacement    Her initial CBC in the office on 8/19/2021 revealed a WBC of 4.41 with 69.2% PMN, 19.5% lymphocyte, 7.7% monocyte, 1.6% eosinophil, 2% basophil. Hgb 9.9, MCV 88.3, ,000.     I discussed the fact that her Hgb had continued to slowly improve up to 9.9.     M2A capsule completed on 8/23/2021 was NEGATIVE     I discussed potential need of IV iron replacement if iron panel is still low.     I also discussed the potential need for bone marrow aspiration and biopsy if iron panel is stable, there is no noted acute GI blood loss, and other serology is unrevealing. Serology 8/19/2021  Serum Fe - 39  TIBC - 270  Fe sat - 14%  Ferritin - 13.6    B12 - 408  Folate - 6.5    LDH -625 (313-618)    SPEP -no M spike with immunofixation negative  QI - IgG 894, IgA 201, IgM 56  Free serum light chain kappa 20.6 with K/L ratio 1.26    Her iron saturation above was low so I contacted her to see if she wanted to increase her iron to twice a day or take IV iron. She elected parenteral iron replacement. She received Injectafer on 8/31 and 9/8/2021. She is feeling much better since getting the Injectafer. Hgb today is up to 11.3, MCV 92.1 normalized with the iron replacement    2. Mild basophilia/leukopenia -new progressive finding requiring discussion and further evaluation     WBC today is decreased slightly to 3.85 with 2.3% basophil. I am going to send peripheral blood to [facility] for evaluation. 3.  History of DVT/PE/heterozygous factor V Leiden    She reports that her diagnosis of PE was initially at [facility] in [facility]. She was treated with warfarin. When she relocated to [place] she was evaluated by hematologist in [facility] around 2005 and was found to have a heterozygous factor V Leiden. She was told that she needed to be on lifelong anticoagulation. She was on Coumadin until she presented to the hospital with a GI bleed 4/27/2021.   Dr. Chaitanya Hyman did place a removable IVC filter with plans to remove this now that the GI bleedhemoglobin is stable. She was treated also for the suspected area of GI blood lossdistal esophagus. She continues on her Protonix. It is okay from our standpoint for her to have her IVC filter removed and to resume her warfarin. · Breast cancer screening. Bilateral screening mammogram at [facility] on 11/2/2020 was BI-RADS 2     · Colon cancer screening. Colonoscopy per Dr. Altagracia Barrera 6/25/2021 revealed 8 mm diameter sessile hepatic polyp (tubular adenoma)     · Cervical/GYN cancer screening. Prior hysterectomy      Immunization History   Administered Date(s) Administered    COVID-19, Moderna, PF, 100mcg/0.5mL 12/30/2020, 01/26/2021         Orders Placed This Encounter   Procedures    Miscellaneous Sendout 1        Return in about 3 months (around 1/1/2022) for With [facility].      Froylan Robertson PA-C  10:32 AM  10/1/2021

## 2021-10-01 ENCOUNTER — OFFICE VISIT (OUTPATIENT)
Dept: HEMATOLOGY | Age: 66
End: 2021-10-01
Payer: MEDICARE

## 2021-10-01 ENCOUNTER — HOSPITAL ENCOUNTER (OUTPATIENT)
Dept: INFUSION THERAPY | Age: 66
Discharge: HOME | End: 2021-10-01
Payer: MEDICARE

## 2021-10-01 VITALS
HEIGHT: 61 IN | BODY MASS INDEX: 35.29 KG/M2 | OXYGEN SATURATION: 98 % | SYSTOLIC BLOOD PRESSURE: 122 MMHG | DIASTOLIC BLOOD PRESSURE: 76 MMHG | WEIGHT: 186.9 LBS | HEART RATE: 75 BPM

## 2021-10-01 DIAGNOSIS — D72.819 LEUKOPENIA, UNSPECIFIED TYPE: ICD-10-CM

## 2021-10-01 DIAGNOSIS — K90.9 INTESTINAL MALABSORPTION, UNSPECIFIED TYPE: ICD-10-CM

## 2021-10-01 DIAGNOSIS — D64.9 ANEMIA, UNSPECIFIED TYPE: ICD-10-CM

## 2021-10-01 DIAGNOSIS — D72.824 BASOPHILIA: ICD-10-CM

## 2021-10-01 DIAGNOSIS — D68.51 FACTOR V LEIDEN MUTATION (HCC): ICD-10-CM

## 2021-10-01 DIAGNOSIS — Z79.01 WARFARIN ANTICOAGULATION: ICD-10-CM

## 2021-10-01 DIAGNOSIS — D50.0 IRON DEFICIENCY ANEMIA DUE TO CHRONIC BLOOD LOSS: Primary | ICD-10-CM

## 2021-10-01 LAB
BASOPHILS ABSOLUTE: 0.09 K/UL (ref 0.01–0.08)
BASOPHILS RELATIVE PERCENT: 2.3 % (ref 0.1–1.2)
EOSINOPHILS ABSOLUTE: 0.07 K/UL (ref 0.04–0.54)
EOSINOPHILS RELATIVE PERCENT: 1.8 % (ref 0.7–7)
HCT VFR BLD CALC: 36.1 % (ref 34.1–44.9)
HEMOGLOBIN: 11.3 G/DL (ref 11.2–15.7)
LYMPHOCYTES ABSOLUTE: 0.76 K/UL (ref 1.18–3.74)
LYMPHOCYTES RELATIVE PERCENT: 19.7 % (ref 19.3–53.1)
MCH RBC QN AUTO: 28.8 PG (ref 25.6–32.2)
MCHC RBC AUTO-ENTMCNC: 31.3 G/DL (ref 32.3–35.5)
MCV RBC AUTO: 92.1 FL (ref 79.4–94.8)
MONOCYTES ABSOLUTE: 0.26 K/UL (ref 0.24–0.82)
MONOCYTES RELATIVE PERCENT: 6.8 % (ref 4.7–12.5)
NEUTROPHILS ABSOLUTE: 2.67 K/UL (ref 1.56–6.13)
NEUTROPHILS RELATIVE PERCENT: 69.4 % (ref 34–71.1)
PDW BLD-RTO: 18.7 % (ref 11.7–14.4)
PLATELET # BLD: 168 K/UL (ref 182–369)
PMV BLD AUTO: 9.1 FL (ref 7.4–10.4)
RBC # BLD: 3.92 M/UL (ref 3.93–5.22)
WBC # BLD: 3.85 K/UL (ref 3.98–10.04)

## 2021-10-01 PROCEDURE — 36415 COLL VENOUS BLD VENIPUNCTURE: CPT

## 2021-10-01 PROCEDURE — 99212 OFFICE O/P EST SF 10 MIN: CPT

## 2021-10-01 PROCEDURE — 1123F ACP DISCUSS/DSCN MKR DOCD: CPT | Performed by: PHYSICIAN ASSISTANT

## 2021-10-01 PROCEDURE — G8427 DOCREV CUR MEDS BY ELIG CLIN: HCPCS | Performed by: PHYSICIAN ASSISTANT

## 2021-10-01 PROCEDURE — G8400 PT W/DXA NO RESULTS DOC: HCPCS | Performed by: PHYSICIAN ASSISTANT

## 2021-10-01 PROCEDURE — 85025 COMPLETE CBC W/AUTO DIFF WBC: CPT

## 2021-10-01 PROCEDURE — 99214 OFFICE O/P EST MOD 30 MIN: CPT | Performed by: PHYSICIAN ASSISTANT

## 2021-10-01 PROCEDURE — 1036F TOBACCO NON-USER: CPT | Performed by: PHYSICIAN ASSISTANT

## 2021-10-01 PROCEDURE — G8417 CALC BMI ABV UP PARAM F/U: HCPCS | Performed by: PHYSICIAN ASSISTANT

## 2021-10-01 PROCEDURE — 1090F PRES/ABSN URINE INCON ASSESS: CPT | Performed by: PHYSICIAN ASSISTANT

## 2021-10-01 PROCEDURE — G8484 FLU IMMUNIZE NO ADMIN: HCPCS | Performed by: PHYSICIAN ASSISTANT

## 2021-10-01 PROCEDURE — 3017F COLORECTAL CA SCREEN DOC REV: CPT | Performed by: PHYSICIAN ASSISTANT

## 2021-10-01 PROCEDURE — 4040F PNEUMOC VAC/ADMIN/RCVD: CPT | Performed by: PHYSICIAN ASSISTANT

## 2021-10-01 ASSESSMENT — ENCOUNTER SYMPTOMS
BLOOD IN STOOL: 0
COUGH: 0
BACK PAIN: 0
PHOTOPHOBIA: 0
SORE THROAT: 0
DIARRHEA: 0
TROUBLE SWALLOWING: 0
ABDOMINAL DISTENTION: 0
VOICE CHANGE: 0
EYE DISCHARGE: 0
NAUSEA: 0
ABDOMINAL PAIN: 0
CONSTIPATION: 0
WHEEZING: 0
COLOR CHANGE: 0
EYE ITCHING: 0
VOMITING: 0
SHORTNESS OF BREATH: 0

## 2021-10-04 ENCOUNTER — TRANSCRIBE ORDERS (OUTPATIENT)
Dept: ADMINISTRATIVE | Facility: HOSPITAL | Age: 66
End: 2021-10-04

## 2021-10-04 DIAGNOSIS — Z12.31 SCREENING MAMMOGRAM, ENCOUNTER FOR: Primary | ICD-10-CM

## 2021-10-08 ENCOUNTER — TELEPHONE (OUTPATIENT)
Dept: VASCULAR SURGERY | Age: 66
End: 2021-10-08

## 2021-10-08 NOTE — TELEPHONE ENCOUNTER
CALLED PT TO LET HER KNOW I GOT HER APT SET UP O DISCUSS IVC FILTER REMOVAL.  PT VOICED SHE UNDERSTOOD

## 2021-10-21 ENCOUNTER — OFFICE VISIT (OUTPATIENT)
Dept: VASCULAR SURGERY | Age: 66
End: 2021-10-21
Payer: MEDICARE

## 2021-10-21 VITALS
SYSTOLIC BLOOD PRESSURE: 126 MMHG | HEART RATE: 76 BPM | RESPIRATION RATE: 18 BRPM | DIASTOLIC BLOOD PRESSURE: 73 MMHG | TEMPERATURE: 97.4 F | OXYGEN SATURATION: 96 %

## 2021-10-21 DIAGNOSIS — Z79.01 LONG TERM CURRENT USE OF ANTICOAGULANT: ICD-10-CM

## 2021-10-21 DIAGNOSIS — Z86.711 PERSONAL HISTORY OF PULMONARY EMBOLISM: ICD-10-CM

## 2021-10-21 DIAGNOSIS — Z86.718 PERSONAL HISTORY OF DVT (DEEP VEIN THROMBOSIS): ICD-10-CM

## 2021-10-21 DIAGNOSIS — D68.51 FACTOR V LEIDEN MUTATION (HCC): Primary | ICD-10-CM

## 2021-10-21 PROCEDURE — 1123F ACP DISCUSS/DSCN MKR DOCD: CPT | Performed by: PHYSICIAN ASSISTANT

## 2021-10-21 PROCEDURE — G8417 CALC BMI ABV UP PARAM F/U: HCPCS | Performed by: PHYSICIAN ASSISTANT

## 2021-10-21 PROCEDURE — G8427 DOCREV CUR MEDS BY ELIG CLIN: HCPCS | Performed by: PHYSICIAN ASSISTANT

## 2021-10-21 PROCEDURE — 1090F PRES/ABSN URINE INCON ASSESS: CPT | Performed by: PHYSICIAN ASSISTANT

## 2021-10-21 PROCEDURE — 4040F PNEUMOC VAC/ADMIN/RCVD: CPT | Performed by: PHYSICIAN ASSISTANT

## 2021-10-21 PROCEDURE — 3017F COLORECTAL CA SCREEN DOC REV: CPT | Performed by: PHYSICIAN ASSISTANT

## 2021-10-21 PROCEDURE — G8484 FLU IMMUNIZE NO ADMIN: HCPCS | Performed by: PHYSICIAN ASSISTANT

## 2021-10-21 PROCEDURE — 1036F TOBACCO NON-USER: CPT | Performed by: PHYSICIAN ASSISTANT

## 2021-10-21 PROCEDURE — 99213 OFFICE O/P EST LOW 20 MIN: CPT | Performed by: PHYSICIAN ASSISTANT

## 2021-10-21 PROCEDURE — G8400 PT W/DXA NO RESULTS DOC: HCPCS | Performed by: PHYSICIAN ASSISTANT

## 2021-10-21 NOTE — PROGRESS NOTES
Patient Care Team:  Mone Osborn as PCP - General (Family Medicine)    History and physical:  Mrs. Fabienne Gar is a 58-year-old female who has a history of factor V Leiden mutation, DVT, PE on long-term anticoagulation. She developed a GI bleed which her anticoagulation had to be stopped. She had an IVC filter placed on 4/29/2021 by Dr. Carlos Hanna. He comes in today to discuss possible IVC filter retrieval.  Ports that she has been cleared by GI and hematology to restart her anticoagulation. However,  she has not because she was not sure if she needed to with the filter still in place. Her H&H's are stable and she is having no more GI bleeding. Tammi Crawford is a 74 y.o. female with the following history reviewed and recorded in [facility]:  Patient Active Problem List    Diagnosis Date Noted    Iron deficiency anemia 08/19/2021    Malabsorption due to intolerance, not elsewhere classified 08/19/2021    Ulcer of esophagus without bleeding 05/28/2021    Gastric ulcer without hemorrhage or perforation 05/28/2021    Chronic diarrhea 05/28/2021    Anemia 05/28/2021    Personal history of colonic polyps 05/28/2021    Acute blood loss anemia 04/29/2021    Laceration of nose     Closed fracture of nasal bones      Examination. CT FACIAL BONES WO CONTRAST 4/27/2021 1:27 AM   History: The patient fell and have a swelling and laceration of the   nose. DLP: 267 mGycm. The CT scan of the facial bones is performed without intravenous   contrast enhancement. The images are acquired in axial plane and   subsequent reconstruction in coronal and sagittal planes. There is no previous study for comparison. There is a moderately depressed fracture of the right nasal bone with   a mild overlapping of the bony fragments. There is adjacent soft   tissue swelling and laceration/soft tissue air. The fracture does not   appear to involve the nasal septum. There is a nondisplaced fracture   of the anterior nasal spine. The maxilla, the mandible, the orbits and zygomatic arches are normal.       Impression   A moderately displaced fracture of the right nasal bone   with a mild overlapping of the bony fragments. A nondisplaced fracture           Factor V Leiden mutation ([facility] 04/27/2021     Formatting of this note might be different from the original.  - Unknown if hetero or homozygous  - PE 1990s  - DVT and multiple superficial clots      Essential hypertension 04/27/2021     Formatting of this note might be different from the original.  : [  ]      Lupus anticoagulant disorder ([facility] 04/27/2021     Formatting of this note might be different from the original.  Probable per hematology records. Thrombophilia.  Protein S deficiency ([facility] 04/27/2021    Gastroesophageal reflux disease with esophagitis and hemorrhage 04/27/2021    GI bleed 04/27/2021     Added automatically from request for surgery [id number]      Warfarin anticoagulation      Current Outpatient Medications   Medication Sig Dispense Refill    pantoprazole (PROTONIX) 40 MG tablet Take 1 tablet by mouth every morning (before breakfast) 30 tablet 9    Cholecalciferol (VITAMIN D3) 50 MCG (2000 UT) CAPS Take 1,000 Units by mouth 2 times daily      potassium chloride (KLOR-CON M) 20 MEQ extended release tablet 2 times daily       simvastatin (ZOCOR) 40 MG tablet TAKE 1 TABLET BY MOUTH ONCE DAILY      EUTHYROX 88 MCG tablet TAKE 1 TABLET BY MOUTH ONCE DAILY      calcium carbonate (TUMS) 500 MG chewable tablet Take 3 tablets by mouth 2 times daily       ondansetron (ZOFRAN) 4 MG tablet TAKE 1 TABLET 30 MIN BEFORE EACH DOSE OF BOWEL PREP. THEN EVERY 6 HOURS A NEEDED 6 tablet 0    Triamterene-HCTZ (MAXZIDE PO) Take 37.5 mg by mouth Pt takes 37.5/25 mg daily      FLUoxetine (PROZAC) 20 MG capsule Take 20 mg by mouth daily      pregabalin (LYRICA) 50 MG capsule Take 50 mg by mouth 2 times daily.        HYDROcodone-acetaminophen (NORCO) 7.5-325 MG per tablet Take 1 tablet by mouth every 6 hours as needed for Pain. No current facility-administered medications for this visit.      Allergies: Bacitracin-polymyxin b, Macrobid [nitrofurantoin], Neomycin-bacitracin-polymyxin [bacitracin-neomycin-polymyxin], and Sulfa antibiotics  Past Medical History:   Diagnosis Date    Chronic back pain     pain management    Depression     Factor 5 Leiden mutation, heterozygous ([address]     Gastric ulcer     GI bleed     History of blood transfusion     Hx of blood clots     Nasal fracture     closed; no surgery or reduction needed    Pulmonary embolism (HCC)     Thyroid disease     Warfarin anticoagulation      Past Surgical History:   Procedure Laterality Date    BLADDER SURGERY      BLADDER SUSPENSION      , done with hysterectomy    COLONOSCOPY  approx 2017    [facility]:  \"polyps repeat in 1 yr\" per pt recall    COLONOSCOPY N/A 2021    Dr Angelo Jorge, polyp, redundant tortuous colon, int hemorrhoids, 5 year recall   [address], VAGINAL      done while bladder surgery being done    IVC [address]       with hysterectomy    THYROIDECTOMY          UPPER GASTROINTESTINAL ENDOSCOPY N/A 2021    Dr Dos Santos-Nonbleeding 8 mm distal esophageal ulcer secondary to GERD, 1.2 cm antral ulcer, 1 cm inflammatory antral polyp-HP, gastritis    UPPER GASTROINTESTINAL ENDOSCOPY N/A 2021    Dr Rashid Salcedo- 3-4cm HH, esophagitis , gastric polyps; NEG h pylori, NEG barretts (however had the endoscopic appearance , so pt was given a 3 yr recall for re-evaluation)     Family History   Problem Relation Age of Onset    Breast Cancer Mother          at age 50 years   [facility] Heart Attack Father         when pt 1 months old  of a fatal MI - was at age 64 years    No Known Problems Son          in MVA at age 24 years   [facility] No Known Problems Son     No Known Problems Son    [facility] No Known Problems Son     Colon Cancer Neg Hx     Colon Polyps Neg Hx     Esophageal Cancer Neg Hx     Liver Cancer Neg Hx     Rectal Cancer Neg Hx     Stomach Cancer Neg Hx      Social History     Tobacco Use    Smoking status: Never Smoker    Smokeless tobacco: Never Used   Substance Use Topics    Alcohol use: Not Currently       Review of Systems    Constitutional    No fever or chills   HENT  no HA's, tinnitus, rhinorrhea, sore throat  Eyes  no sudden vision change or amaurosis. Respiratory  SOB or chest pain  Cardiovascular  no chest pain, syncope, or significant dizziness. No palpitations   Gastrointestinal  no significant abdominal pain. No blood in stool. No diarrhea, nausea, or vomiting. Genitourinary  No difficulty urinating, dysuria, frequency, or urgency. No flank pain or hematuria. Musculoskeletal  no back pain or myalgia. Skin  no rashes or wounds   Neurologic  no dizziness, facial asymmetry, or light headedness. No seizures. No new onset of partial or complete loss of vision affecting only one eye, speech difficulty or lateralizing weakness, numbness/tingling   Hematologic  no excessive bleeding. Psychiatric  no severe anxiety or nervousness. No confusion. All other review of systems are negative. Physical Exam    /73 Comment: left  Pulse 76   Temp 97.4 °F (36.3 °C)   Resp 18   SpO2 96%     Constitutional  No acute distress. HENT  head normocephalic. Hearing is intact   Eyes  conjunctiva normal.  EOMS normal.  No exudate. No icterus. Neck- ROM appears normal, no tracheal deviation. Cardiovascular  Regular rate and rhythm. Heart sounds are normal.  No murmur, rub, or gallop. Carotid pulses bilaterally without bruit. Extremities - Radial and ulnar pulses are 2+ to palpation bilaterally. No cyanosis, clubbing, or significant edema. No signs atheroembolic event. Pulmonary  effort appears normal.  No respiratory distress.     Lungs - Breath sounds normal.   GI - Abdomen  No distension or palpable mass. Genitourinary  deferred. Musculoskeletal  ROM appears normal.  No significant edema. Neurologic  alert and oriented X 3. Face symmetric. No lateralizing weakness noted. Skin  warm, dry, and intact. No rash, erythema, or pallor. Psychiatric  mood, affect, and behavior appear normal.  Judgment and thought processes appear normal.      Assessment      1. Factor V Leiden mutation ([address]    2. Long term current use of anticoagulant    3. Personal history of DVT (deep vein thrombosis)    4. Personal history of pulmonary embolism    5. GI Bleed (resloved)      Plan      Dr. Lupis Ayers discussed with the patient possibly switching to Eliquis based on her insurance coverage versus restarting her Coumadin. She is going to check on this and call us back regarding her wishes.    We will follow-up in 2 months to discuss possible IVC filter retrieval

## 2021-10-26 ENCOUNTER — TELEPHONE (OUTPATIENT)
Dept: VASCULAR SURGERY | Age: 66
End: 2021-10-26

## 2021-10-26 NOTE — TELEPHONE ENCOUNTER
Attempted to call [facility] did not say we had permission to leave a VM. I will try to call again. I am trying to find out if patient has thought about starting Eliquis or id shewants to stay on Coumadin. If she wants to stay on her Coumadin then she will need to follow up with her PCP to have this regulated.

## 2021-11-05 ENCOUNTER — HOSPITAL ENCOUNTER (OUTPATIENT)
Dept: MAMMOGRAPHY | Facility: HOSPITAL | Age: 66
Discharge: HOME | End: 2021-11-05
Admitting: FAMILY MEDICINE

## 2021-11-05 PROCEDURE — 77067 SCR MAMMO BI INCL CAD: CPT

## 2021-11-05 PROCEDURE — 77063 BREAST TOMOSYNTHESIS BI: CPT

## 2021-12-22 ENCOUNTER — TELEPHONE (OUTPATIENT)
Dept: VASCULAR SURGERY | Age: 66
End: 2021-12-22

## 2022-01-03 NOTE — PROGRESS NOTES
Progress Note      Pt Name: Loyda Arroyo: 1955  MRN: [id number]    Date of evaluation: 1/7/2022  History Obtained From:  patient, spouse, electronic medical record    CHIEF COMPLAINT:    Chief Complaint   Patient presents with    Follow-up     Iron deficiency anemia due to chronic blood loss     Current active problems  Iron deficiency anemia      HISTORY OF PRESENT ILLNESS:    Ceasar Patel is a 74 y.o.  female severe anemia with iron deficiency component seen initially in the office on 8/19/2021. Her initial serology revealed a persistently low iron saturation. She received Injectafer on 8/31 and 9/8/2021. She was found to have ulcerations of the distal esophagus on endoscopy previously noted during her hospitalization. She continues taking Protonix 40 mg daily. She denies any transfusions. She has a history of DVT, heterozygous factor V Leiden, pulmonary embolus and was previously on warfarin but that was discontinued with IVC filter placed when she had her acute GI blood loss. She has an appointmentthat was delayed but sees vascular surgery next week for consideration of IVC filter removal.    She does have hypothyroidism and is on Synthroid. She continues taking Tums daily but is taking it because of hypocalcemia. She is also on potassium replacement for hypokalemia. Blood pressure is stable on her Maxide. She takes Zocor daily. HEMATOLOGY HISTORY: Iron deficiency anemia  Indio Hines was seen in initial hematology consultation on 8/19/2021 referred by IMELDA Barrow for persistent anemia.     Indio Hines presented to [facility] earlier this year with a syncopal episode. She was also noted to have been experiencing melena x4 days.   Prior to the admission however she reports that she was feeling more dyspnea with exertion, fatigue for a couple of months.     CBC 4/27/2021 revealed a WBC of 9 with 80% PMN, 14.5% lymphocyte, 4.7% monocyte, Hgb 9.2, MCV 91.5, PLT 424,000     Hgb dropped to 6.6 on 4/27/2021.     Serology 4/27/2021  Serum Fe - 122  TIBC - 303  Fe sat - 40%  Ferritin - 33.5  Retic - 2.03% with absolute 0.0554  Haptoglobin - 250     CMP - crt 0.8 GFR > 60, total protein low, total bilirubin normal < 0.2     She had a history of heterozygous factor V Leiden, pulmonary embolus and was on warfarin.     Dr. Jane Aaron performed an endoscopy on 4/28/2021 as inpatient at [address] [place] [address] which revealed  · Nonbleeding 8 mm distal esophageal ulcer secondary to GERD  · 1.2 cm antral ulcer, origin of GI bleed which had subsided  · 1 cm inflammatory antral polyp (hyperplastic gastric mucosa with reactive features)     She did receive transfusions of 3 unit packed red blood cell  --  2 separate occasions during her hospitalization.     Dr. Verónica Ponce placed an IVC filter on 4/28/2021.          Endoscopy per Dr. Batool Basurto 6/25/2021 revealed  · Abnormal lower third esophagus with 1 to 1.5 cm segments of tonguelike projections (intestinal metaplasia not seen)  · Distal esophageal ring  · 3 to 4 cm in length hiatal hernia without erosions  · Multiple, small, sessile 3 to 4 mm diameter gastric fundic and body polyps     Colonoscopy per Dr. Batool Basurto 6/25/2021 revealed  8 mm diameter sessile hepatic polyp (tubular adenoma)     Radha Duke reported that she was taking Ferrex 150 daily for 3 months. She denied any GI upset with the iron replacement.     She denied any melena or any obvious GI bleeding at present.     She continued to have fatigue.     Her initial CBC in the office on 8/19/2021 revealed a WBC of 4.41 with 69.2% PMN, 19.5% lymphocyte, 7.7% monocyte, 1.6% eosinophil, 2% basophil.   Hgb 9.9, MCV 88.3, ,000.     I discussed the fact that her Hgb had continued to slowly improve up to 9.9.     M2A capsule completed on 8/23/2021 was NEGATIVE     I discussed potential need of IV iron replacement if iron panel is still low.     I also discussed the potential need for bone marrow aspiration and biopsy if iron panel is stable, there is no noted acute GI blood loss, and other serology is unrevealing. Serology 8/19/2021  Serum Fe - 39  TIBC - 270  Fe sat - 14%  Ferritin - 13.6    B12 - 408  Folate - 6.5    LDH -625 (313-618)    SPEP -no M spike with immunofixation negative  QI - IgG 894, IgA 201, IgM 56  Free serum light chain kappa 20.6 with K/L ratio 1.26    Her iron saturation above was low so I contacted her to see if she wanted to increase her iron to twice a day or take IV iron. She elected parenteral iron replacement.       TREATMENT SUMMARY  Injectafer 750 mg IV on 8/31 and 9/8/2021        Past Medical History:   Diagnosis Date    Chronic back pain     pain management    Depression     Factor 5 Leiden mutation, heterozygous ([address]     Gastric ulcer     GI bleed     History of blood transfusion     Hx of blood clots     Nasal fracture     closed; no surgery or reduction needed    Pulmonary embolism ([address]     Thyroid disease     Warfarin anticoagulation         Past Surgical History:   Procedure Laterality Date    BLADDER SURGERY      BLADDER SUSPENSION      2013, done with hysterectomy    COLONOSCOPY  approx 2017    [facility]  \"polyps repeat in 1 yr\" per pt recall    COLONOSCOPY N/A 06/25/2021    Dr Shirley Delong, polyp, redundant tortuous colon, int hemorrhoids, 5 year recall   [address], VAGINAL  2013    done while bladder surgery being done    [address]C [address]      2013 with hysterectomy    THYROIDECTOMY      2013    UPPER GASTROINTESTINAL ENDOSCOPY N/A 04/28/2021    Dr Dos Santos-Nonbleeding 8 mm distal esophageal ulcer secondary to GERD, 1.2 cm antral ulcer, 1 cm inflammatory antral polyp-HP, gastritis    UPPER GASTROINTESTINAL ENDOSCOPY N/A 06/25/2021    Dr Viki Torres- 3-4cm HH, esophagitis , gastric polyps; NEG h pylori, NEG barretts (however had the endoscopic appearance , so pt was given a 3 yr recall for re-evaluation)           Current Outpatient Medications:     pantoprazole (PROTONIX) 40 MG tablet, Take 1 tablet by mouth every morning (before breakfast), Disp: 30 tablet, Rfl: 9    Cholecalciferol (VITAMIN D3) 50 MCG (2000 UT) CAPS, Take 1,000 Units by mouth 2 times daily, Disp: , Rfl:     potassium chloride (KLOR-CON M) 20 MEQ extended release tablet, 2 times daily , Disp: , Rfl:     simvastatin (ZOCOR) 40 MG tablet, TAKE 1 TABLET BY MOUTH ONCE DAILY, Disp: , Rfl:     EUTHYROX 88 MCG tablet, TAKE 1 TABLET BY MOUTH ONCE DAILY, Disp: , Rfl:     calcium carbonate (TUMS) 500 MG chewable tablet, Take 3 tablets by mouth 2 times daily , Disp: , Rfl:     ondansetron (ZOFRAN) 4 MG tablet, TAKE 1 TABLET 30 MIN BEFORE EACH DOSE OF BOWEL PREP. THEN EVERY 6 HOURS A NEEDED, Disp: 6 tablet, Rfl: 0    Triamterene-HCTZ (MAXZIDE PO), Take 37.5 mg by mouth Pt takes 37.5/25 mg daily, Disp: , Rfl:     FLUoxetine (PROZAC) 20 MG capsule, Take 20 mg by mouth daily, Disp: , Rfl:     pregabalin (LYRICA) 50 MG capsule, Take 50 mg by mouth 2 times daily.  , Disp: , Rfl:     HYDROcodone-acetaminophen (NORCO) 7.5-325 MG per tablet, Take 1 tablet by mouth every 6 hours as needed for Pain., Disp: , Rfl:      Allergies   Allergen Reactions    Bacitracin-Polymyxin B Hives, Itching and Rash    Macrobid [Nitrofurantoin] Itching and Rash    Neomycin-Bacitracin-Polymyxin [Bacitracin-Neomycin-Polymyxin] Hives, Itching and Rash    Sulfa Antibiotics Hives, Itching and Rash       Social History     Tobacco Use    Smoking status: Never Smoker    Smokeless tobacco: Never Used   Vaping Use    Vaping Use: Never used   Substance Use Topics    Alcohol use: Not Currently    Drug use: Never       Family History   Problem Relation Age of Onset    Breast Cancer Mother          at age 50 years   [facility] Heart Attack Father         when pt 1 months old  of a fatal MI - was at age 62 years    No Known Problems Son  in MVA at age 24 years    No Known Problems Son     No Known Problems Son     No Known Problems Son     Colon Cancer Neg Hx     Colon Polyps Neg Hx     Esophageal Cancer Neg Hx     Liver Cancer Neg Hx     Rectal Cancer Neg Hx     Stomach Cancer Neg Hx        Subjective   REVIEW OF SYSTEMS:   Review of Systems   Constitutional: Negative for chills, diaphoresis, fatigue, fever and unexpected weight change. HENT: Negative for mouth sores, nosebleeds, sore throat, trouble swallowing and voice change. Eyes: Negative for photophobia, discharge and itching. Respiratory: Negative for cough, shortness of breath and wheezing. Cardiovascular: Negative for chest pain, palpitations and leg swelling. Gastrointestinal: Negative for abdominal distention, abdominal pain, blood in stool, constipation, diarrhea, nausea and vomiting. Endocrine: Positive for heat intolerance. Negative for cold intolerance, polydipsia and polyuria. Genitourinary: Negative for difficulty urinating, dysuria, hematuria and urgency. Musculoskeletal: Negative for arthralgias, back pain, joint swelling and myalgias. Skin: Negative for color change and rash. Neurological: Negative for dizziness, tremors, seizures, syncope and light-headedness. Hematological: Negative for adenopathy. Does not bruise/bleed easily. Psychiatric/Behavioral: Negative for behavioral problems and suicidal ideas. The patient is not nervous/anxious. All other systems reviewed and are negative. Objective   /88   Pulse 78   Ht 5' 2\" (1.575 m)   Wt 192 lb (87.1 kg)   SpO2 97%   BMI 35.12 kg/m²     PHYSICAL EXAM:  Physical Exam  Constitutional:       Appearance: She is well-developed. HENT:      Head: Normocephalic and atraumatic. Eyes:      General: No scleral icterus. Conjunctiva/sclera: Conjunctivae normal.   Neck:      Trachea: No tracheal deviation.    Cardiovascular:      Rate and Rhythm: Normal rate and regular rhythm. Heart sounds: Normal heart sounds. No murmur heard. Pulmonary:      Effort: Pulmonary effort is normal. No respiratory distress. Breath sounds: Normal breath sounds. Chest:   Breasts:      Right: No axillary adenopathy or supraclavicular adenopathy. Left: No axillary adenopathy or supraclavicular adenopathy. Abdominal:      General: Bowel sounds are normal. There is no distension. Palpations: Abdomen is soft. Tenderness: There is no abdominal tenderness. Musculoskeletal:         General: No tenderness. Cervical back: Normal range of motion and neck supple. Comments: Full ROM in all 4 extremities   Lymphadenopathy:      Cervical: No cervical adenopathy. Upper Body:      Right upper body: No supraclavicular or axillary adenopathy. Left upper body: No supraclavicular or axillary adenopathy. Lower Body: No right inguinal adenopathy. No left inguinal adenopathy. Skin:     General: Skin is warm and dry. Findings: No rash. Neurological:      Mental Status: She is alert and oriented to person, place, and time. Coordination: Coordination normal.   Psychiatric:         Behavior: Behavior normal.         Thought Content: Thought content normal.     CBC reviewed by me    VISIT DIAGNOSES  1. Iron deficiency anemia due to chronic blood loss    2. Intestinal malabsorption, unspecified type    3. Leukopenia, unspecified type        ASSESSMENT/PLAN:    1. Iron deficiency anemia -distal esophagitis/ulcerations. Normalized with IV iron replacement    She received Injectafer on 8/31/2021 and 9/8/2021. Her hemoglobin today is normal at 13.3 with MCV 93.3. No further GI bleeding. 2.  Mild basophilia/leukopenia -     She follow-up in the office on 10/1/2021 with WBC noted to have decreased slightly to 3.85 with 2.3% basophil.      Peripheral blood smear [facility] 10/1/2021  · Normochromic normocytic anemia with mild anisocytosis, occasional elliptocytes, and rufus cells seen. · Mild leukopenia, overall the neutrophils are hypersegmented. No circulating blasts  · Lymphocytes have heterogenous morphologic features  · Platelets appear slightly decreased in number with no platelet clumps seen    CBC today reveals a normal WBC of 4.6. She has a slight increase in neutrophils today at 77.6% however reports that she had a steroid injection a couple of weeks ago. Basophil percentage is normal at 0.4% today. 3.  History of DVT/PE/heterozygous factor V Leiden    She reports that her diagnosis of PE was initially at [facility] [place] [facility]. She was treated with warfarin. When she relocated to [place] she was evaluated by hematologist in [facility] around 2005 and was found to have a heterozygous factor V Leiden. She was told that she needed to be on lifelong anticoagulation. She was on Coumadin until she presented to the hospital with a GI bleed 4/27/2021. Dr. Liam Estrada did place a removable IVC filter with plans to remove this now that the GI bleedhemoglobin is stable. She was treated also for the suspected area of GI blood lossdistal esophagus. She continues on her Protonix. It is okay from our standpoint for her to have her IVC filter removed and to resume her warfarin. [facility]. · Breast cancer screening. Bilateral screening mammogram at [facility] on 11/5/2021 was BIRADS 2       · Colon cancer screening. Colonoscopy per Dr. Steven Champagne 6/25/2021 revealed 8 mm diameter sessile hepatic polyp (tubular adenoma)     · Cervical/GYN cancer screening. Prior hysterectomy    Immunization History   Administered Date(s) Administered    COVID-19, Moderna, Booster, PF, 50mcg/0.25ml 11/19/2021    COVID-19, Foster Sharlene, Primary or Immunocompromised, PF, 100mcg/0.5mL 12/30/2020, 01/26/2021          Return in about 6 months (around 7/7/2022) for With [facility].      Lida Maddox PA-C  10:18 AM  1/7/2022

## 2022-01-07 ENCOUNTER — OFFICE VISIT (OUTPATIENT)
Dept: HEMATOLOGY | Age: 67
End: 2022-01-07
Payer: MEDICARE

## 2022-01-07 ENCOUNTER — HOSPITAL ENCOUNTER (OUTPATIENT)
Dept: INFUSION THERAPY | Age: 67
Discharge: HOME | End: 2022-01-07
Payer: MEDICARE

## 2022-01-07 VITALS
OXYGEN SATURATION: 97 % | DIASTOLIC BLOOD PRESSURE: 88 MMHG | SYSTOLIC BLOOD PRESSURE: 132 MMHG | BODY MASS INDEX: 35.33 KG/M2 | HEIGHT: 62 IN | HEART RATE: 78 BPM | WEIGHT: 192 LBS

## 2022-01-07 DIAGNOSIS — D72.819 LEUKOPENIA, UNSPECIFIED TYPE: ICD-10-CM

## 2022-01-07 DIAGNOSIS — D50.0 IRON DEFICIENCY ANEMIA DUE TO CHRONIC BLOOD LOSS: Primary | ICD-10-CM

## 2022-01-07 DIAGNOSIS — Z79.01 WARFARIN ANTICOAGULATION: ICD-10-CM

## 2022-01-07 DIAGNOSIS — D68.51 FACTOR V LEIDEN MUTATION (HCC): ICD-10-CM

## 2022-01-07 DIAGNOSIS — D64.9 ANEMIA, UNSPECIFIED TYPE: ICD-10-CM

## 2022-01-07 DIAGNOSIS — K90.9 INTESTINAL MALABSORPTION, UNSPECIFIED TYPE: ICD-10-CM

## 2022-01-07 LAB
BASOPHILS ABSOLUTE: 0.02 K/UL (ref 0.01–0.08)
BASOPHILS RELATIVE PERCENT: 0.4 % (ref 0.1–1.2)
EOSINOPHILS ABSOLUTE: 0.04 K/UL (ref 0.04–0.54)
EOSINOPHILS RELATIVE PERCENT: 0.9 % (ref 0.7–7)
HCT VFR BLD CALC: 38.9 % (ref 34.1–44.9)
HEMOGLOBIN: 13.3 G/DL (ref 11.2–15.7)
LYMPHOCYTES ABSOLUTE: 0.76 K/UL (ref 1.18–3.74)
LYMPHOCYTES RELATIVE PERCENT: 16.5 % (ref 19.3–53.1)
MCH RBC QN AUTO: 31.9 PG (ref 25.6–32.2)
MCHC RBC AUTO-ENTMCNC: 34.2 G/DL (ref 32.3–35.5)
MCV RBC AUTO: 93.3 FL (ref 79.4–94.8)
MONOCYTES ABSOLUTE: 0.21 K/UL (ref 0.24–0.82)
MONOCYTES RELATIVE PERCENT: 4.6 % (ref 4.7–12.5)
NEUTROPHILS ABSOLUTE: 3.57 K/UL (ref 1.56–6.13)
NEUTROPHILS RELATIVE PERCENT: 77.6 % (ref 34–71.1)
PDW BLD-RTO: 12.6 % (ref 11.7–14.4)
PLATELET # BLD: 202 K/UL (ref 182–369)
PMV BLD AUTO: 8.8 FL (ref 7.4–10.4)
RBC # BLD: 4.17 M/UL (ref 3.93–5.22)
WBC # BLD: 4.6 K/UL (ref 3.98–10.04)

## 2022-01-07 PROCEDURE — 85025 COMPLETE CBC W/AUTO DIFF WBC: CPT

## 2022-01-07 PROCEDURE — G8400 PT W/DXA NO RESULTS DOC: HCPCS | Performed by: PHYSICIAN ASSISTANT

## 2022-01-07 PROCEDURE — 99211 OFF/OP EST MAY X REQ PHY/QHP: CPT

## 2022-01-07 PROCEDURE — 1090F PRES/ABSN URINE INCON ASSESS: CPT | Performed by: PHYSICIAN ASSISTANT

## 2022-01-07 PROCEDURE — G8427 DOCREV CUR MEDS BY ELIG CLIN: HCPCS | Performed by: PHYSICIAN ASSISTANT

## 2022-01-07 PROCEDURE — G8417 CALC BMI ABV UP PARAM F/U: HCPCS | Performed by: PHYSICIAN ASSISTANT

## 2022-01-07 PROCEDURE — 3017F COLORECTAL CA SCREEN DOC REV: CPT | Performed by: PHYSICIAN ASSISTANT

## 2022-01-07 PROCEDURE — 99213 OFFICE O/P EST LOW 20 MIN: CPT | Performed by: PHYSICIAN ASSISTANT

## 2022-01-07 PROCEDURE — 4040F PNEUMOC VAC/ADMIN/RCVD: CPT | Performed by: PHYSICIAN ASSISTANT

## 2022-01-07 PROCEDURE — G8484 FLU IMMUNIZE NO ADMIN: HCPCS | Performed by: PHYSICIAN ASSISTANT

## 2022-01-07 PROCEDURE — 36415 COLL VENOUS BLD VENIPUNCTURE: CPT

## 2022-01-07 PROCEDURE — 1036F TOBACCO NON-USER: CPT | Performed by: PHYSICIAN ASSISTANT

## 2022-01-07 PROCEDURE — 1123F ACP DISCUSS/DSCN MKR DOCD: CPT | Performed by: PHYSICIAN ASSISTANT

## 2022-01-07 ASSESSMENT — ENCOUNTER SYMPTOMS
CONSTIPATION: 0
VOICE CHANGE: 0
SORE THROAT: 0
EYE ITCHING: 0
ABDOMINAL DISTENTION: 0
COLOR CHANGE: 0
EYE DISCHARGE: 0
ABDOMINAL PAIN: 0
PHOTOPHOBIA: 0
TROUBLE SWALLOWING: 0
VOMITING: 0
DIARRHEA: 0
NAUSEA: 0
SHORTNESS OF BREATH: 0
WHEEZING: 0
COUGH: 0
BACK PAIN: 0
BLOOD IN STOOL: 0

## 2022-01-13 ENCOUNTER — OFFICE VISIT (OUTPATIENT)
Dept: VASCULAR SURGERY | Age: 67
End: 2022-01-13
Payer: MEDICARE

## 2022-01-13 VITALS
BODY MASS INDEX: 35.33 KG/M2 | TEMPERATURE: 96.2 F | HEIGHT: 62 IN | WEIGHT: 192 LBS | RESPIRATION RATE: 20 BRPM | SYSTOLIC BLOOD PRESSURE: 137 MMHG | DIASTOLIC BLOOD PRESSURE: 74 MMHG | OXYGEN SATURATION: 95 % | HEART RATE: 66 BPM

## 2022-01-13 DIAGNOSIS — Z79.01 LONG TERM CURRENT USE OF ANTICOAGULANT: ICD-10-CM

## 2022-01-13 DIAGNOSIS — Z86.711 PERSONAL HISTORY OF PULMONARY EMBOLISM: Primary | ICD-10-CM

## 2022-01-13 DIAGNOSIS — D68.51 FACTOR V LEIDEN MUTATION (HCC): ICD-10-CM

## 2022-01-13 DIAGNOSIS — Z86.718 PERSONAL HISTORY OF DVT (DEEP VEIN THROMBOSIS): ICD-10-CM

## 2022-01-13 PROCEDURE — 4040F PNEUMOC VAC/ADMIN/RCVD: CPT | Performed by: PHYSICIAN ASSISTANT

## 2022-01-13 PROCEDURE — 1123F ACP DISCUSS/DSCN MKR DOCD: CPT | Performed by: PHYSICIAN ASSISTANT

## 2022-01-13 PROCEDURE — 3017F COLORECTAL CA SCREEN DOC REV: CPT | Performed by: PHYSICIAN ASSISTANT

## 2022-01-13 PROCEDURE — G8417 CALC BMI ABV UP PARAM F/U: HCPCS | Performed by: PHYSICIAN ASSISTANT

## 2022-01-13 PROCEDURE — G8484 FLU IMMUNIZE NO ADMIN: HCPCS | Performed by: PHYSICIAN ASSISTANT

## 2022-01-13 PROCEDURE — G8427 DOCREV CUR MEDS BY ELIG CLIN: HCPCS | Performed by: PHYSICIAN ASSISTANT

## 2022-01-13 PROCEDURE — 99214 OFFICE O/P EST MOD 30 MIN: CPT | Performed by: PHYSICIAN ASSISTANT

## 2022-01-13 PROCEDURE — 1090F PRES/ABSN URINE INCON ASSESS: CPT | Performed by: PHYSICIAN ASSISTANT

## 2022-01-13 PROCEDURE — G8400 PT W/DXA NO RESULTS DOC: HCPCS | Performed by: PHYSICIAN ASSISTANT

## 2022-01-13 PROCEDURE — 1036F TOBACCO NON-USER: CPT | Performed by: PHYSICIAN ASSISTANT

## 2022-01-13 NOTE — PROGRESS NOTES
Patient Care Team:  Jay Riojas as PCP - General (Family Medicine)    History and physical:  Mrs. Jermain Miles is a 25-year-old female who has a history of factor V Leiden mutation, DVT, PE on long-term anticoagulation. She developed a GI bleed for which her anticoagulation had to be stopped. She had an IVC filter placed on 4/29/2021 by Dr. Tenzin Prieto. She denies any further GI bleeding. She is on Coumadin daily regulated by her PCP. She would like to have the IVC filter removed. Danuta Moise is a 74 y.o. female with the following history reviewed and recorded in xiao qu wu you:  Patient Active Problem List    Diagnosis Date Noted    Iron deficiency anemia 08/19/2021    Malabsorption due to intolerance, not elsewhere classified 08/19/2021    Ulcer of esophagus without bleeding 05/28/2021    Gastric ulcer without hemorrhage or perforation 05/28/2021    Chronic diarrhea 05/28/2021    Anemia 05/28/2021    Personal history of colonic polyps 05/28/2021    Acute blood loss anemia 04/29/2021    Laceration of nose     Closed fracture of nasal bones      Examination. CT FACIAL BONES WO CONTRAST 4/27/2021 1:27 AM   History: The patient fell and have a swelling and laceration of the   nose. DLP: 267 mGycm. The CT scan of the facial bones is performed without intravenous   contrast enhancement. The images are acquired in axial plane and   subsequent reconstruction in coronal and sagittal planes. There is no previous study for comparison. There is a moderately depressed fracture of the right nasal bone with   a mild overlapping of the bony fragments. There is adjacent soft   tissue swelling and laceration/soft tissue air. The fracture does not   appear to involve the nasal septum. There is a nondisplaced fracture   of the anterior nasal spine.    The maxilla, the mandible, the orbits and zygomatic arches are normal.       Impression   A moderately displaced fracture of the right nasal bone   with a mild overlapping of the bony fragments. A nondisplaced fracture           Factor V Leiden mutation ([facility] 04/27/2021     Formatting of this note might be different from the original.  - Unknown if hetero or homozygous  - PE 1990s  - DVT and multiple superficial clots      Essential hypertension 04/27/2021     Formatting of this note might be different from the original.  : [  ]      Lupus anticoagulant disorder ([facility] 04/27/2021     Formatting of this note might be different from the original.  Probable per hematology records. Thrombophilia.  Protein S deficiency ([facility] 04/27/2021    Gastroesophageal reflux disease with esophagitis and hemorrhage 04/27/2021    GI bleed 04/27/2021     Added automatically from request for surgery [id number]      Warfarin anticoagulation      Current Outpatient Medications   Medication Sig Dispense Refill    pantoprazole (PROTONIX) 40 MG tablet Take 1 tablet by mouth every morning (before breakfast) 30 tablet 9    Cholecalciferol (VITAMIN D3) 50 MCG (2000 UT) CAPS Take 1,000 Units by mouth 2 times daily      potassium chloride (KLOR-CON M) 20 MEQ extended release tablet 2 times daily       simvastatin (ZOCOR) 40 MG tablet TAKE 1 TABLET BY MOUTH ONCE DAILY      EUTHYROX 88 MCG tablet TAKE 1 TABLET BY MOUTH ONCE DAILY      calcium carbonate (TUMS) 500 MG chewable tablet Take 3 tablets by mouth 2 times daily       ondansetron (ZOFRAN) 4 MG tablet TAKE 1 TABLET 30 MIN BEFORE EACH DOSE OF BOWEL PREP. THEN EVERY 6 HOURS A NEEDED 6 tablet 0    Triamterene-HCTZ (MAXZIDE PO) Take 37.5 mg by mouth Pt takes 37.5/25 mg daily      FLUoxetine (PROZAC) 20 MG capsule Take 20 mg by mouth daily      pregabalin (LYRICA) 50 MG capsule Take 50 mg by mouth 2 times daily.  HYDROcodone-acetaminophen (NORCO) 7.5-325 MG per tablet Take 1 tablet by mouth every 6 hours as needed for Pain. No current facility-administered medications for this visit.      Allergies: Bacitracin-polymyxin b, Macrobid [nitrofurantoin], Neomycin-bacitracin-polymyxin [bacitracin-neomycin-polymyxin], and Sulfa antibiotics  Past Medical History:   Diagnosis Date    Chronic back pain     pain management    Depression     Factor 5 Leiden mutation, heterozygous ([address]     Gastric ulcer     GI bleed     History of blood transfusion     Hx of blood clots     Nasal fracture     closed; no surgery or reduction needed    Pulmonary embolism (H[facility])     Thyroid disease     Warfarin anticoagulation      Past Surgical History:   Procedure Laterality Date    BLADDER SURGERY      BLADDER SUSPENSION      , done with hysterectomy    COLONOSCOPY  approx 2017    [address] [facility]:  \"polyps repeat in 1 yr\" per pt recall    COLONOSCOPY N/A 2021    Dr Madi Corrales, polyp, redundant tortuous colon, int hemorrhoids, 5 year recall   [address], VAGINAL      done while bladder surgery being done    IVC [address]       with hysterectomy    THYROIDECTOMY          UPPER GASTROINTESTINAL ENDOSCOPY N/A 2021    Dr Dos Santos-Nonbleeding 8 mm distal esophageal ulcer secondary to GERD, 1.2 cm antral ulcer, 1 cm inflammatory antral polyp-HP, gastritis    UPPER GASTROINTESTINAL ENDOSCOPY N/A 2021    Dr Gemma Herron- 3-4cm HH, esophagitis , gastric polyps; NEG h pylori, NEG barretts (however had the endoscopic appearance , so pt was given a 3 yr recall for re-evaluation)     Family History   Problem Relation Age of Onset    Breast Cancer Mother          at age 50 years   [facility] Heart Attack Father         when pt 1 months old  of a fatal MI - was at age 64 years    No Known Problems Son          in MVA at age 24 years    No Known Problems Son     No Known Problems Son     No Known Problems Son     Colon Cancer Neg Hx     Colon Polyps Neg Hx     Esophageal Cancer Neg Hx     Liver Cancer Neg Hx     Rectal Cancer Neg Hx     Stomach Cancer Neg Hx      Social History     Tobacco Use    Smoking status: Never Smoker    Smokeless tobacco: Never Used   Substance Use Topics    Alcohol use: Not Currently       Review of Systems    Constitutional    No fever or chills   HENT  no HA's, tinnitus, rhinorrhea, sore throat  Eyes  no sudden vision change or amaurosis. Respiratory  SOB or chest pain  Cardiovascular  no chest pain, syncope, or significant dizziness. No palpitations   Gastrointestinal  no significant abdominal pain. No blood in stool. No diarrhea, nausea, or vomiting. Genitourinary  No difficulty urinating, dysuria, frequency, or urgency. No flank pain or hematuria. Musculoskeletal  no back pain or myalgia. Skin  no rashes or wounds   Neurologic  no dizziness, facial asymmetry, or light headedness. No seizures. No new onset of partial or complete loss of vision affecting only one eye, speech difficulty or lateralizing weakness, numbness/tingling   Hematologic  no excessive bleeding. Psychiatric  no severe anxiety or nervousness. No confusion. All other review of systems are negative. Physical Exam    /74 (Site: Left Upper Arm)   Pulse 66   Temp 96.2 °F (35.7 °C)   Resp 20   Ht 5' 2\" (1.575 m)   Wt 192 lb (87.1 kg)   SpO2 95%   BMI 35.12 kg/m²     Constitutional  No acute distress. HENT  head normocephalic. Hearing is intact   Eyes  conjunctiva normal.  EOMS normal.  No exudate. No icterus. Neck- ROM appears normal, no tracheal deviation. Cardiovascular  Regular rate and rhythm. Heart sounds are normal.  No murmur, rub, or gallop. Carotid pulses bilaterally without bruit. Extremities - Radial and ulnar pulses are 2+ to palpation bilaterally. No cyanosis, clubbing, or significant edema. No signs atheroembolic event. Pulmonary  effort appears normal.  No respiratory distress. Lungs - Breath sounds normal.   GI - Abdomen  No distension or palpable mass.   Genitourinary  deferred. Musculoskeletal  ROM appears normal.  No significant edema. Neurologic  alert and oriented X 3. Face symmetric. No lateralizing weakness noted. Skin  warm, dry, and intact. No rash, erythema, or pallor. Psychiatric  mood, affect, and behavior appear normal.  Judgment and thought processes appear normal.    Options were discussed with the patient including continued medical management versus proceeding with angiography and potential intervention for retrieval of IVC filter. Patient has opted to proceed with retrieval of IVC filter risks have been discussed with the patient including but not limited to MI, death, CVA, bleed, nerve injury, infection, contrast nephropathy, possible need for dialysis, and need for further surgery. Assessment      1. History of DVT  2. History of pulmonary embolus  3. Factor V Leiden deficiency  4.   Long-term anticoagulation therapy      Plan      Proceed with retrieval IVC filter

## 2022-01-14 ENCOUNTER — TELEPHONE (OUTPATIENT)
Dept: VASCULAR SURGERY | Age: 67
End: 2022-01-14

## 2022-01-14 DIAGNOSIS — Z01.818 PRE-OP TESTING: Primary | ICD-10-CM

## 2022-01-14 NOTE — TELEPHONE ENCOUNTER
I sw the pt to discuss the details of her upcoming procedure with Dr. Mariaa Buchanan on [facility]. 1/20/2022. The pt will need to arrive at [facility] reg at 0630 and be NPO after midnight the night before. She will need to hold Maxzide the morning of the procedure. I asked the pt if she would like her labs and covid here in town or closer to home? The pt asked to have orders sent to [facility]. She will need to arrive at their OP lab on Mon for the required testing. She will not need a scheduled appt or need to fast prior. Please buy a bottle of hibiclens soap, wash the night before and the morning of your procedure. You will need a  to take you home. Please call the office with any questions or concerns. The pt voiced understanding and is aware of these instructions.

## 2022-01-17 ENCOUNTER — PREP FOR PROCEDURE (OUTPATIENT)
Dept: VASCULAR SURGERY | Age: 67
End: 2022-01-17

## 2022-01-17 RX ORDER — SODIUM CHLORIDE 0.9 % (FLUSH) 0.9 %
10 SYRINGE (ML) INJECTION PRN
Status: CANCELLED | OUTPATIENT
Start: 2022-01-17

## 2022-01-17 RX ORDER — SODIUM CHLORIDE 9 MG/ML
25 INJECTION, SOLUTION INTRAVENOUS PRN
Status: CANCELLED | OUTPATIENT
Start: 2022-01-17

## 2022-01-17 RX ORDER — SODIUM CHLORIDE 9 MG/ML
INJECTION, SOLUTION INTRAVENOUS CONTINUOUS
Status: CANCELLED | OUTPATIENT
Start: 2022-01-17

## 2022-01-17 NOTE — H&P (VIEW-ONLY)
Patient Care Team:  Nandini Amin as PCP - General (Family Medicine)    History and physical:  Mrs. James Castillo is a 77-year-old female who has a history of factor V Leiden mutation, DVT, PE on long-term anticoagulation. She developed a GI bleed for which her anticoagulation had to be stopped. She had an IVC filter placed on 4/29/2021 by Dr. Jeremiah Mera. She denies any further GI bleeding. She is on Coumadin daily regulated by her PCP. She would like to have the IVC filter removed. Geneva Cornejo is a 74 y.o. female with the following history reviewed and recorded in [employer]:  Patient Active Problem List    Diagnosis Date Noted    Iron deficiency anemia 08/19/2021    Malabsorption due to intolerance, not elsewhere classified 08/19/2021    Ulcer of esophagus without bleeding 05/28/2021    Gastric ulcer without hemorrhage or perforation 05/28/2021    Chronic diarrhea 05/28/2021    Anemia 05/28/2021    Personal history of colonic polyps 05/28/2021    Acute blood loss anemia 04/29/2021    Laceration of nose     Closed fracture of nasal bones      Examination. CT FACIAL BONES WO CONTRAST 4/27/2021 1:27 AM   History: The patient fell and have a swelling and laceration of the   nose. DLP: 267 mGycm. The CT scan of the facial bones is performed without intravenous   contrast enhancement. The images are acquired in axial plane and   subsequent reconstruction in coronal and sagittal planes. There is no previous study for comparison. There is a moderately depressed fracture of the right nasal bone with   a mild overlapping of the bony fragments. There is adjacent soft   tissue swelling and laceration/soft tissue air. The fracture does not   appear to involve the nasal septum. There is a nondisplaced fracture   of the anterior nasal spine.    The maxilla, the mandible, the orbits and zygomatic arches are normal.       Impression   A moderately displaced fracture of the right nasal bone   with a mild overlapping of the bony fragments. A nondisplaced fracture           Factor V Leiden mutation ([facility] 04/27/2021     Formatting of this note might be different from the original.  - Unknown if hetero or homozygous  - PE 1990s  - DVT and multiple superficial clots      Essential hypertension 04/27/2021     Formatting of this note might be different from the original.  : [  ]      Lupus anticoagulant disorder ([facility] 04/27/2021     Formatting of this note might be different from the original.  Probable per hematology records. Thrombophilia.  Protein S deficiency ([facility] 04/27/2021    Gastroesophageal reflux disease with esophagitis and hemorrhage 04/27/2021    GI bleed 04/27/2021     Added automatically from request for surgery [id number]      Warfarin anticoagulation      Current Outpatient Medications   Medication Sig Dispense Refill    pantoprazole (PROTONIX) 40 MG tablet Take 1 tablet by mouth every morning (before breakfast) 30 tablet 9    Cholecalciferol (VITAMIN D3) 50 MCG (2000 UT) CAPS Take 1,000 Units by mouth 2 times daily      potassium chloride (KLOR-CON M) 20 MEQ extended release tablet 2 times daily       simvastatin (ZOCOR) 40 MG tablet TAKE 1 TABLET BY MOUTH ONCE DAILY      EUTHYROX 88 MCG tablet TAKE 1 TABLET BY MOUTH ONCE DAILY      calcium carbonate (TUMS) 500 MG chewable tablet Take 3 tablets by mouth 2 times daily       ondansetron (ZOFRAN) 4 MG tablet TAKE 1 TABLET 30 MIN BEFORE EACH DOSE OF BOWEL PREP. THEN EVERY 6 HOURS A NEEDED 6 tablet 0    Triamterene-HCTZ (MAXZIDE PO) Take 37.5 mg by mouth Pt takes 37.5/25 mg daily      FLUoxetine (PROZAC) 20 MG capsule Take 20 mg by mouth daily      pregabalin (LYRICA) 50 MG capsule Take 50 mg by mouth 2 times daily.  HYDROcodone-acetaminophen (NORCO) 7.5-325 MG per tablet Take 1 tablet by mouth every 6 hours as needed for Pain. No current facility-administered medications for this visit.      Allergies: Bacitracin-polymyxin b, Stomach Cancer Neg Hx      Social History     Tobacco Use    Smoking status: Never Smoker    Smokeless tobacco: Never Used   Substance Use Topics    Alcohol use: Not Currently       Review of Systems    Constitutional    No fever or chills   HENT  no HA's, tinnitus, rhinorrhea, sore throat  Eyes  no sudden vision change or amaurosis. Respiratory  SOB or chest pain  Cardiovascular  no chest pain, syncope, or significant dizziness. No palpitations   Gastrointestinal  no significant abdominal pain. No blood in stool. No diarrhea, nausea, or vomiting. Genitourinary  No difficulty urinating, dysuria, frequency, or urgency. No flank pain or hematuria. Musculoskeletal  no back pain or myalgia. Skin  no rashes or wounds   Neurologic  no dizziness, facial asymmetry, or light headedness. No seizures. No new onset of partial or complete loss of vision affecting only one eye, speech difficulty or lateralizing weakness, numbness/tingling   Hematologic  no excessive bleeding. Psychiatric  no severe anxiety or nervousness. No confusion. All other review of systems are negative. Physical Exam    Constitutional  No acute distress. HENT  head normocephalic. Hearing is intact   Eyes  conjunctiva normal.  EOMS normal.  No exudate. No icterus. Neck- ROM appears normal, no tracheal deviation. Cardiovascular  Regular rate and rhythm. Heart sounds are normal.  No murmur, rub, or gallop. Carotid pulses bilaterally without bruit. Extremities - Radial and ulnar pulses are 2+ to palpation bilaterally. No cyanosis, clubbing, or significant edema. No signs atheroembolic event. Pulmonary  effort appears normal.  No respiratory distress. Lungs - Breath sounds normal.   GI - Abdomen  No distension or palpable mass. Genitourinary  deferred. Musculoskeletal  ROM appears normal.  No significant edema. Neurologic  alert and oriented X 3. Face symmetric. No lateralizing weakness noted.    Skin  warm, dry, and intact. No rash, erythema, or pallor. Psychiatric  mood, affect, and behavior appear normal.  Judgment and thought processes appear normal.    Options were discussed with the patient including continued medical management versus proceeding with angiography and potential intervention for retrieval of IVC filter. Patient has opted to proceed with retrieval of IVC filter risks have been discussed with the patient including but not limited to MI, death, CVA, bleed, nerve injury, infection, contrast nephropathy, possible need for dialysis, and need for further surgery. Assessment      1. History of DVT  2. History of pulmonary embolus  3. Factor V Leiden deficiency  4.   Long-term anticoagulation therapy      Plan      Proceed with retrieval IVC filter

## 2022-01-17 NOTE — H&P
Patient Care Team:  Jumana Garibay as PCP - General (Family Medicine)    History and physical:  Mrs. Costa Muller is a 41-year-old female who has a history of factor V Leiden mutation, DVT, PE on long-term anticoagulation. She developed a GI bleed for which her anticoagulation had to be stopped. She had an IVC filter placed on 4/29/2021 by Dr. Mariaa Buchanan. She denies any further GI bleeding. She is on Coumadin daily regulated by her PCP. She would like to have the IVC filter removed. Lee Robbins is a 74 y.o. female with the following history reviewed and recorded in [employer]:  Patient Active Problem List    Diagnosis Date Noted    Iron deficiency anemia 08/19/2021    Malabsorption due to intolerance, not elsewhere classified 08/19/2021    Ulcer of esophagus without bleeding 05/28/2021    Gastric ulcer without hemorrhage or perforation 05/28/2021    Chronic diarrhea 05/28/2021    Anemia 05/28/2021    Personal history of colonic polyps 05/28/2021    Acute blood loss anemia 04/29/2021    Laceration of nose     Closed fracture of nasal bones      Examination. CT FACIAL BONES WO CONTRAST 4/27/2021 1:27 AM   History: The patient fell and have a swelling and laceration of the   nose. DLP: 267 mGycm. The CT scan of the facial bones is performed without intravenous   contrast enhancement. The images are acquired in axial plane and   subsequent reconstruction in coronal and sagittal planes. There is no previous study for comparison. There is a moderately depressed fracture of the right nasal bone with   a mild overlapping of the bony fragments. There is adjacent soft   tissue swelling and laceration/soft tissue air. The fracture does not   appear to involve the nasal septum. There is a nondisplaced fracture   of the anterior nasal spine.    The maxilla, the mandible, the orbits and zygomatic arches are normal.       Impression   A moderately displaced fracture of the right nasal bone   with a mild overlapping of the bony fragments. A nondisplaced fracture           Factor V Leiden mutation ([facility] 04/27/2021     Formatting of this note might be different from the original.  - Unknown if hetero or homozygous  - PE 1990s  - DVT and multiple superficial clots      Essential hypertension 04/27/2021     Formatting of this note might be different from the original.  : [  ]      Lupus anticoagulant disorder ([facility] 04/27/2021     Formatting of this note might be different from the original.  Probable per hematology records. Thrombophilia.  Protein S deficiency ([facility] 04/27/2021    Gastroesophageal reflux disease with esophagitis and hemorrhage 04/27/2021    GI bleed 04/27/2021     Added automatically from request for surgery [id number]      Warfarin anticoagulation      Current Outpatient Medications   Medication Sig Dispense Refill    pantoprazole (PROTONIX) 40 MG tablet Take 1 tablet by mouth every morning (before breakfast) 30 tablet 9    Cholecalciferol (VITAMIN D3) 50 MCG (2000 UT) CAPS Take 1,000 Units by mouth 2 times daily      potassium chloride (KLOR-CON M) 20 MEQ extended release tablet 2 times daily       simvastatin (ZOCOR) 40 MG tablet TAKE 1 TABLET BY MOUTH ONCE DAILY      EUTHYROX 88 MCG tablet TAKE 1 TABLET BY MOUTH ONCE DAILY      calcium carbonate (TUMS) 500 MG chewable tablet Take 3 tablets by mouth 2 times daily       ondansetron (ZOFRAN) 4 MG tablet TAKE 1 TABLET 30 MIN BEFORE EACH DOSE OF BOWEL PREP. THEN EVERY 6 HOURS A NEEDED 6 tablet 0    Triamterene-HCTZ (MAXZIDE PO) Take 37.5 mg by mouth Pt takes 37.5/25 mg daily      FLUoxetine (PROZAC) 20 MG capsule Take 20 mg by mouth daily      pregabalin (LYRICA) 50 MG capsule Take 50 mg by mouth 2 times daily.  HYDROcodone-acetaminophen (NORCO) 7.5-325 MG per tablet Take 1 tablet by mouth every 6 hours as needed for Pain. No current facility-administered medications for this visit.      Allergies: Bacitracin-polymyxin b, Macrobid [nitrofurantoin], Neomycin-bacitracin-polymyxin [bacitracin-neomycin-polymyxin], and Sulfa antibiotics  Past Medical History:   Diagnosis Date    Chronic back pain     pain management    Depression     Factor 5 Leiden mutation, heterozygous ([address]     Gastric ulcer     GI bleed     History of blood transfusion     Hx of blood clots     Nasal fracture     closed; no surgery or reduction needed    Pulmonary embolism (HCC)     Thyroid disease     Warfarin anticoagulation      Past Surgical History:   Procedure Laterality Date    BLADDER SURGERY      BLADDER SUSPENSION      , done with hysterectomy    COLONOSCOPY  approx 2017    [address] [facility]:  \"polyps repeat in 1 yr\" per pt recall    COLONOSCOPY N/A 2021    Dr Queen Hess, polyp, redundant tortuous colon, int hemorrhoids, 5 year recall   [address], VAGINAL      done while bladder surgery being done    [address]       with hysterectomy    THYROIDECTOMY          UPPER GASTROINTESTINAL ENDOSCOPY N/A 2021    Dr Dos Santos-Nonbleeding 8 mm distal esophageal ulcer secondary to GERD, 1.2 cm antral ulcer, 1 cm inflammatory antral polyp-HP, gastritis    UPPER GASTROINTESTINAL ENDOSCOPY N/A 2021    Dr Btaool Basurto- 3-4cm HH, esophagitis , gastric polyps; NEG h pylori, NEG barretts (however had the endoscopic appearance , so pt was given a 3 yr recall for re-evaluation)     Family History   Problem Relation Age of Onset    Breast Cancer Mother          at age 50 years   [place] Heart Attack Father         when pt 1 months old  of a fatal MI - was at age 64 years    No Known Problems Son          in MVA at age 24 years    No Known Problems Son     No Known Problems Son     No Known Problems Son     Colon Cancer Neg Hx     Colon Polyps Neg Hx     Esophageal Cancer Neg Hx     Liver Cancer Neg Hx     Rectal Cancer Neg Hx     Stomach Cancer Neg Hx      Social History     Tobacco Use    Smoking status: Never Smoker    Smokeless tobacco: Never Used   Substance Use Topics    Alcohol use: Not Currently       Review of Systems    Constitutional -   No fever or chills   HENT - no HA's, tinnitus, rhinorrhea, sore throat  Eyes - no sudden vision change or amaurosis. Respiratory - SOB or chest pain  Cardiovascular - no chest pain, syncope, or significant dizziness. No palpitations   Gastrointestinal - no significant abdominal pain. No blood in stool. No diarrhea, nausea, or vomiting. Genitourinary - No difficulty urinating, dysuria, frequency, or urgency. No flank pain or hematuria. Musculoskeletal - no back pain or myalgia. Skin - no rashes or wounds   Neurologic - no dizziness, facial asymmetry, or light headedness. No seizures. No new onset of partial or complete loss of vision affecting only one eye, speech difficulty or lateralizing weakness, numbness/tingling   Hematologic - no excessive bleeding. Psychiatric - no severe anxiety or nervousness. No confusion. All other review of systems are negative. Physical Exam    Constitutional - No acute distress. HENT - head normocephalic. Hearing is intact   Eyes - conjunctiva normal.  EOMS normal.  No exudate. No icterus. Neck- ROM appears normal, no tracheal deviation. Cardiovascular - Regular rate and rhythm. Heart sounds are normal.  No murmur, rub, or gallop. Carotid pulses bilaterally without bruit. Extremities - Radial and ulnar pulses are 2+ to palpation bilaterally. No cyanosis, clubbing, or significant edema. No signs atheroembolic event. Pulmonary - effort appears normal.  No respiratory distress. Lungs - Breath sounds normal.   GI - Abdomen - No distension or palpable mass. Genitourinary - deferred. Musculoskeletal - ROM appears normal.  No significant edema. Neurologic - alert and oriented X 3. Face symmetric. No lateralizing weakness noted.    Skin - warm, dry, and intact. No rash, erythema, or pallor. Psychiatric - mood, affect, and behavior appear normal.  Judgment and thought processes appear normal.    Options were discussed with the patient including continued medical management versus proceeding with angiography and potential intervention for retrieval of IVC filter. Patient has opted to proceed with retrieval of IVC filter risks have been discussed with the patient including but not limited to MI, death, CVA, bleed, nerve injury, infection, contrast nephropathy, possible need for dialysis, and need for further surgery. Assessment      1. History of DVT  2. History of pulmonary embolus  3. Factor V Leiden deficiency  4.   Long-term anticoagulation therapy      Plan      Proceed with retrieval IVC filter

## 2022-01-19 ENCOUNTER — TELEPHONE (OUTPATIENT)
Dept: VASCULAR SURGERY | Age: 67
End: 2022-01-19

## 2022-01-19 NOTE — TELEPHONE ENCOUNTER
I sw the pt to let her know due to a cancellation her arrival time has been moved up to 0600 for her procedure tomorrow with Dr. Liam Estrada. The pt voiced understanding and is aware of this change.

## 2022-01-20 ENCOUNTER — HOSPITAL ENCOUNTER (OUTPATIENT)
Dept: INTERVENTIONAL RADIOLOGY/VASCULAR | Age: 67
Discharge: HOME | End: 2022-01-20
Payer: MEDICARE

## 2022-01-20 VITALS
HEART RATE: 57 BPM | HEIGHT: 62 IN | WEIGHT: 192 LBS | SYSTOLIC BLOOD PRESSURE: 130 MMHG | DIASTOLIC BLOOD PRESSURE: 70 MMHG | BODY MASS INDEX: 35.33 KG/M2 | TEMPERATURE: 96.7 F | OXYGEN SATURATION: 95 % | RESPIRATION RATE: 12 BRPM

## 2022-01-20 DIAGNOSIS — I82.4Y9 ACUTE DEEP VEIN THROMBOSIS (DVT) OF PROXIMAL VEIN OF LOWER EXTREMITY, UNSPECIFIED LATERALITY (HCC): ICD-10-CM

## 2022-01-20 DIAGNOSIS — Z95.828 PRESENCE OF IVC FILTER: ICD-10-CM

## 2022-01-20 LAB
INR BLD: 2.32 (ref 0.88–1.18)
PROTHROMBIN TIME: 25.2 SEC (ref 12–14.6)

## 2022-01-20 PROCEDURE — 2500000003 HC RX 250 WO HCPCS: Performed by: SURGERY

## 2022-01-20 PROCEDURE — 6360000004 HC RX CONTRAST MEDICATION: Performed by: SURGERY

## 2022-01-20 PROCEDURE — 2580000003 HC RX 258: Performed by: PHYSICIAN ASSISTANT

## 2022-01-20 PROCEDURE — 37193 REM ENDOVAS VENA CAVA FILTER: CPT

## 2022-01-20 PROCEDURE — 6360000002 HC RX W HCPCS: Performed by: SURGERY

## 2022-01-20 PROCEDURE — 36415 COLL VENOUS BLD VENIPUNCTURE: CPT

## 2022-01-20 PROCEDURE — 99152 MOD SED SAME PHYS/QHP 5/>YRS: CPT

## 2022-01-20 PROCEDURE — 99153 MOD SED SAME PHYS/QHP EA: CPT

## 2022-01-20 PROCEDURE — 6360000002 HC RX W HCPCS: Performed by: PHYSICIAN ASSISTANT

## 2022-01-20 PROCEDURE — 2709999900 IR GUIDED IVC FILTER RETRIEVAL

## 2022-01-20 PROCEDURE — 85610 PROTHROMBIN TIME: CPT

## 2022-01-20 PROCEDURE — 37193 REM ENDOVAS VENA CAVA FILTER: CPT | Performed by: SURGERY

## 2022-01-20 RX ORDER — SODIUM CHLORIDE 0.9 % (FLUSH) 0.9 %
10 SYRINGE (ML) INJECTION PRN
Status: DISCONTINUED | OUTPATIENT
Start: 2022-01-20 | End: 2022-01-22 | Stop reason: HOSPADM

## 2022-01-20 RX ORDER — SODIUM CHLORIDE 9 MG/ML
25 INJECTION, SOLUTION INTRAVENOUS PRN
Status: DISCONTINUED | OUTPATIENT
Start: 2022-01-20 | End: 2022-01-22 | Stop reason: HOSPADM

## 2022-01-20 RX ORDER — LEVOTHYROXINE SODIUM 88 UG/1
88 TABLET ORAL DAILY
COMMUNITY

## 2022-01-20 RX ORDER — IODIXANOL 320 MG/ML
INJECTION, SOLUTION INTRAVASCULAR
Status: COMPLETED | OUTPATIENT
Start: 2022-01-20 | End: 2022-01-20

## 2022-01-20 RX ORDER — ONDANSETRON 2 MG/ML
4 INJECTION INTRAMUSCULAR; INTRAVENOUS EVERY 8 HOURS PRN
Status: DISCONTINUED | OUTPATIENT
Start: 2022-01-20 | End: 2022-01-22 | Stop reason: HOSPADM

## 2022-01-20 RX ORDER — SODIUM CHLORIDE 9 MG/ML
INJECTION, SOLUTION INTRAVENOUS CONTINUOUS
Status: DISCONTINUED | OUTPATIENT
Start: 2022-01-20 | End: 2022-01-22 | Stop reason: HOSPADM

## 2022-01-20 RX ORDER — ACETAMINOPHEN 325 MG/1
650 TABLET ORAL EVERY 4 HOURS PRN
Status: DISCONTINUED | OUTPATIENT
Start: 2022-01-20 | End: 2022-01-22 | Stop reason: HOSPADM

## 2022-01-20 RX ORDER — HYDROCODONE BITARTRATE AND ACETAMINOPHEN 5; 325 MG/1; MG/1
1 TABLET ORAL EVERY 4 HOURS PRN
Status: DISCONTINUED | OUTPATIENT
Start: 2022-01-20 | End: 2022-01-22 | Stop reason: HOSPADM

## 2022-01-20 RX ORDER — HYDROCODONE BITARTRATE AND ACETAMINOPHEN 5; 325 MG/1; MG/1
2 TABLET ORAL EVERY 4 HOURS PRN
Status: DISCONTINUED | OUTPATIENT
Start: 2022-01-20 | End: 2022-01-22 | Stop reason: HOSPADM

## 2022-01-20 RX ORDER — MIDAZOLAM HYDROCHLORIDE 1 MG/ML
INJECTION INTRAMUSCULAR; INTRAVENOUS
Status: COMPLETED | OUTPATIENT
Start: 2022-01-20 | End: 2022-01-20

## 2022-01-20 RX ORDER — LIDOCAINE HYDROCHLORIDE 20 MG/ML
INJECTION, SOLUTION INFILTRATION; PERINEURAL
Status: COMPLETED | OUTPATIENT
Start: 2022-01-20 | End: 2022-01-20

## 2022-01-20 RX ORDER — FENTANYL CITRATE 50 UG/ML
INJECTION, SOLUTION INTRAMUSCULAR; INTRAVENOUS
Status: COMPLETED | OUTPATIENT
Start: 2022-01-20 | End: 2022-01-20

## 2022-01-20 RX ORDER — HEPARIN SODIUM 5000 [USP'U]/ML
INJECTION, SOLUTION INTRAVENOUS; SUBCUTANEOUS
Status: COMPLETED | OUTPATIENT
Start: 2022-01-20 | End: 2022-01-20

## 2022-01-20 RX ORDER — WARFARIN SODIUM 4 MG/1
4 TABLET ORAL
COMMUNITY

## 2022-01-20 RX ADMIN — MIDAZOLAM 1 MG: 1 INJECTION INTRAMUSCULAR; INTRAVENOUS at 08:12

## 2022-01-20 RX ADMIN — FENTANYL CITRATE 25 MCG: 50 INJECTION, SOLUTION INTRAMUSCULAR; INTRAVENOUS at 08:12

## 2022-01-20 RX ADMIN — HEPARIN SODIUM 5000 UNITS: 5000 INJECTION INTRAVENOUS; SUBCUTANEOUS at 08:16

## 2022-01-20 RX ADMIN — IODIXANOL 30 ML: 320 INJECTION, SOLUTION INTRAVASCULAR at 08:21

## 2022-01-20 RX ADMIN — LIDOCAINE HYDROCHLORIDE 10 ML: 20 INJECTION, SOLUTION INFILTRATION; PERINEURAL at 08:15

## 2022-01-20 RX ADMIN — SODIUM CHLORIDE: 9 INJECTION, SOLUTION INTRAVENOUS at 07:08

## 2022-01-20 RX ADMIN — Medication 2000 MG: at 08:00

## 2022-01-20 NOTE — PROGRESS NOTES
Patient and  instructed on care of right neck site post IVC filter removal.  Given written instructions. Both stated understanding.

## 2022-01-20 NOTE — OP NOTE
Preoperative Diagnosis:  1. Pulmonary Embolism  2. Retrievable inferior vena cava filter that is no longer needed. 3.  Factor V Leiden mutation    Postoperative Diagnosis:  Same     Procedure:  1. Ultrasound guided access right internal jugular vein  2. Inferior vena cava venograms  3. Retrieval of retrievable Inferior Vena Cava filter ([place])  4. Completion inferior vena cava venograms    Surgeon:  Brian Xiong M.D. Anesthesia:  Intravenous sedation plus local    EBL:  Less than 50 ml    Findings:  1. The right internal jugular vein is patent. 2.  The inferior vena cava filter is patent without any significant clot. 3.  The filter was successfully removed and inspected on the back table and found to be completely intact. 4.  The inferior vena cava is patent after filter retrieval without extravasation of dye or clot. Procedure in Detail:    After consent was achieved and the patient was given IV antibiotics, she was brought to the angiography suite and placed supine on the fluoroscopic table. Intravenous sedation was administered and the neck was prepped and draped in the usual sterile fashion. The skin and subcutaneous tissues in the right neck were anesthetized with lidocaine. A micropuncture needle was used to access the right internal jugular vein under ultrasound guidance. An 0.018 wire was passed through the needle and the needle was replaced with an 0.035 wire, which was placed into the distal inferior vena cava under fluoroscopic visualization. The micropuncture catheter was then removed and a small incision made over the 0035 wire in the patient's neck. A 12 [place]ra[place] x 40 cm dilator/sheath was placed over the wire without resistance under fluoroscopic visualization into the inferior vena cava. The inner cannula and wire were removed and inferior vena cava venograms were performed with the above findings.   An Atrieve 45 mm snare was used to grasp the hook at the apex of the filter. The sheath was passed over the filter to collapse the filter which was removed with gentle traction on the snare. A completion inferior vena cava venogram confirmed that the inferior vena cava was patent without signs of perforation or clot. The sheath was removed, pressure was held for hemostasis, and a sterile dressing was applied. The patient was taken back to their room in goodcondition.

## 2022-06-10 ENCOUNTER — HOSPITAL ENCOUNTER (OUTPATIENT)
Dept: PREADMISSION TESTING | Age: 67
Discharge: HOME | End: 2022-06-14
Payer: MEDICARE

## 2022-06-10 VITALS — WEIGHT: 205 LBS | HEIGHT: 61 IN | BODY MASS INDEX: 38.71 KG/M2

## 2022-06-10 LAB
ANION GAP SERPL CALCULATED.3IONS-SCNC: 11 MMOL/L (ref 7–19)
APTT: 40.2 SEC (ref 26–36.2)
BASOPHILS ABSOLUTE: 0 K/UL (ref 0–0.2)
BASOPHILS RELATIVE PERCENT: 0.5 % (ref 0–1)
BUN BLDV-MCNC: 15 MG/DL (ref 8–23)
CALCIUM SERPL-MCNC: 7.5 MG/DL (ref 8.8–10.2)
CHLORIDE BLD-SCNC: 101 MMOL/L (ref 98–111)
CO2: 29 MMOL/L (ref 22–29)
CREAT SERPL-MCNC: 0.8 MG/DL (ref 0.5–0.9)
EKG P AXIS: 55 DEGREES
EKG P-R INTERVAL: 120 MS
EKG Q-T INTERVAL: 466 MS
EKG QRS DURATION: 92 MS
EKG QTC CALCULATION (BAZETT): 463 MS
EKG T AXIS: 41 DEGREES
EOSINOPHILS ABSOLUTE: 0.1 K/UL (ref 0–0.6)
EOSINOPHILS RELATIVE PERCENT: 1.2 % (ref 0–5)
GFR AFRICAN AMERICAN: >59
GFR NON-AFRICAN AMERICAN: >60
GLUCOSE BLD-MCNC: 86 MG/DL (ref 74–109)
HCT VFR BLD CALC: 38.1 % (ref 37–47)
HEMOGLOBIN: 12.1 G/DL (ref 12–16)
IMMATURE GRANULOCYTES #: 0 K/UL
INR BLD: 2.43 (ref 0.88–1.18)
LYMPHOCYTES ABSOLUTE: 0.8 K/UL (ref 1.1–4.5)
LYMPHOCYTES RELATIVE PERCENT: 20 % (ref 20–40)
MCH RBC QN AUTO: 30.6 PG (ref 27–31)
MCHC RBC AUTO-ENTMCNC: 31.8 G/DL (ref 33–37)
MCV RBC AUTO: 96.5 FL (ref 81–99)
MONOCYTES ABSOLUTE: 0.3 K/UL (ref 0–0.9)
MONOCYTES RELATIVE PERCENT: 6.4 % (ref 0–10)
MRSA SCREEN RT-PCR: NOT DETECTED
NEUTROPHILS ABSOLUTE: 2.9 K/UL (ref 1.5–7.5)
NEUTROPHILS RELATIVE PERCENT: 71.7 % (ref 50–65)
PDW BLD-RTO: 12.3 % (ref 11.5–14.5)
PLATELET # BLD: 206 K/UL (ref 130–400)
PMV BLD AUTO: 9.8 FL (ref 9.4–12.3)
POTASSIUM SERPL-SCNC: 4.4 MMOL/L (ref 3.5–5)
PROTHROMBIN TIME: 27.1 SEC (ref 12–14.6)
RBC # BLD: 3.95 M/UL (ref 4.2–5.4)
SODIUM BLD-SCNC: 141 MMOL/L (ref 136–145)
WBC # BLD: 4.1 K/UL (ref 4.8–10.8)

## 2022-06-10 PROCEDURE — 85610 PROTHROMBIN TIME: CPT

## 2022-06-10 PROCEDURE — 80048 BASIC METABOLIC PNL TOTAL CA: CPT

## 2022-06-10 PROCEDURE — 87641 MR-STAPH DNA AMP PROBE: CPT

## 2022-06-10 PROCEDURE — 85025 COMPLETE CBC W/AUTO DIFF WBC: CPT

## 2022-06-10 PROCEDURE — 85730 THROMBOPLASTIN TIME PARTIAL: CPT

## 2022-06-10 PROCEDURE — 93005 ELECTROCARDIOGRAM TRACING: CPT | Performed by: ORTHOPAEDIC SURGERY

## 2022-06-10 RX ORDER — MAGNESIUM OXIDE 400 MG/1
400 TABLET ORAL DAILY
COMMUNITY

## 2022-06-21 ENCOUNTER — ANESTHESIA (OUTPATIENT)
Dept: OPERATING ROOM | Age: 67
End: 2022-06-21
Payer: MEDICARE

## 2022-06-21 ENCOUNTER — HOSPITAL ENCOUNTER (OUTPATIENT)
Age: 67
Setting detail: OUTPATIENT SURGERY
Discharge: HOME | End: 2022-06-21
Attending: ORTHOPAEDIC SURGERY | Admitting: ORTHOPAEDIC SURGERY
Payer: MEDICARE

## 2022-06-21 ENCOUNTER — ANESTHESIA EVENT (OUTPATIENT)
Dept: OPERATING ROOM | Age: 67
End: 2022-06-21
Payer: MEDICARE

## 2022-06-21 VITALS
OXYGEN SATURATION: 95 % | HEIGHT: 61 IN | WEIGHT: 205 LBS | BODY MASS INDEX: 38.71 KG/M2 | TEMPERATURE: 96.9 F | DIASTOLIC BLOOD PRESSURE: 84 MMHG | HEART RATE: 64 BPM | SYSTOLIC BLOOD PRESSURE: 112 MMHG | RESPIRATION RATE: 16 BRPM

## 2022-06-21 DIAGNOSIS — M17.12 PRIMARY OSTEOARTHRITIS OF LEFT KNEE: Primary | ICD-10-CM

## 2022-06-21 LAB
INR BLD: 0.88 (ref 0.88–1.18)
PROTHROMBIN TIME: 11.8 SEC (ref 12–14.6)

## 2022-06-21 PROCEDURE — 3600000005 HC SURGERY LEVEL 5 BASE: Performed by: ORTHOPAEDIC SURGERY

## 2022-06-21 PROCEDURE — 36415 COLL VENOUS BLD VENIPUNCTURE: CPT

## 2022-06-21 PROCEDURE — 7100000001 HC PACU RECOVERY - ADDTL 15 MIN: Performed by: ORTHOPAEDIC SURGERY

## 2022-06-21 PROCEDURE — 64447 NJX AA&/STRD FEMORAL NRV IMG: CPT | Performed by: NURSE ANESTHETIST, CERTIFIED REGISTERED

## 2022-06-21 PROCEDURE — 2500000003 HC RX 250 WO HCPCS: Performed by: ORTHOPAEDIC SURGERY

## 2022-06-21 PROCEDURE — 2580000003 HC RX 258: Performed by: ORTHOPAEDIC SURGERY

## 2022-06-21 PROCEDURE — 85610 PROTHROMBIN TIME: CPT

## 2022-06-21 PROCEDURE — 6370000000 HC RX 637 (ALT 250 FOR IP): Performed by: ORTHOPAEDIC SURGERY

## 2022-06-21 PROCEDURE — 6360000002 HC RX W HCPCS: Performed by: NURSE ANESTHETIST, CERTIFIED REGISTERED

## 2022-06-21 PROCEDURE — 3700000000 HC ANESTHESIA ATTENDED CARE: Performed by: ORTHOPAEDIC SURGERY

## 2022-06-21 PROCEDURE — 6360000002 HC RX W HCPCS: Performed by: ANESTHESIOLOGY

## 2022-06-21 PROCEDURE — 7100000010 HC PHASE II RECOVERY - FIRST 15 MIN: Performed by: ORTHOPAEDIC SURGERY

## 2022-06-21 PROCEDURE — 7100000000 HC PACU RECOVERY - FIRST 15 MIN: Performed by: ORTHOPAEDIC SURGERY

## 2022-06-21 PROCEDURE — 3700000001 HC ADD 15 MINUTES (ANESTHESIA): Performed by: ORTHOPAEDIC SURGERY

## 2022-06-21 PROCEDURE — 2709999900 HC NON-CHARGEABLE SUPPLY: Performed by: ORTHOPAEDIC SURGERY

## 2022-06-21 PROCEDURE — A4217 STERILE WATER/SALINE, 500 ML: HCPCS | Performed by: ORTHOPAEDIC SURGERY

## 2022-06-21 PROCEDURE — 2500000003 HC RX 250 WO HCPCS: Performed by: NURSE ANESTHETIST, CERTIFIED REGISTERED

## 2022-06-21 PROCEDURE — 6360000002 HC RX W HCPCS

## 2022-06-21 PROCEDURE — 7100000011 HC PHASE II RECOVERY - ADDTL 15 MIN: Performed by: ORTHOPAEDIC SURGERY

## 2022-06-21 PROCEDURE — C1713 ANCHOR/SCREW BN/BN,TIS/BN: HCPCS | Performed by: ORTHOPAEDIC SURGERY

## 2022-06-21 PROCEDURE — 3600000015 HC SURGERY LEVEL 5 ADDTL 15MIN: Performed by: ORTHOPAEDIC SURGERY

## 2022-06-21 PROCEDURE — 6360000002 HC RX W HCPCS: Performed by: ORTHOPAEDIC SURGERY

## 2022-06-21 PROCEDURE — C1776 JOINT DEVICE (IMPLANTABLE): HCPCS | Performed by: ORTHOPAEDIC SURGERY

## 2022-06-21 PROCEDURE — 2580000003 HC RX 258: Performed by: NURSE ANESTHETIST, CERTIFIED REGISTERED

## 2022-06-21 DEVICE — EXTENSION STEM L30MM DIA14MM KNEE TAPR CEM PERSONA: Type: IMPLANTABLE DEVICE | Site: KNEE | Status: FUNCTIONAL

## 2022-06-21 DEVICE — COMPONENT ARTC SURF PS 6-9 CD 11 MM LT TIB FIX BEAR: Type: IMPLANTABLE DEVICE | Site: KNEE | Status: FUNCTIONAL

## 2022-06-21 DEVICE — COMPONENT FEM SZ 8 NAR L KNEE CO CHROM CEM POST STBL COR: Type: IMPLANTABLE DEVICE | Site: KNEE | Status: FUNCTIONAL

## 2022-06-21 DEVICE — PSN TIB STM 5 DEG SZ D L: Type: IMPLANTABLE DEVICE | Site: KNEE | Status: FUNCTIONAL

## 2022-06-21 DEVICE — CEMENT BNE 40GM HI VISC RADPQ FOR REV SURG: Type: IMPLANTABLE DEVICE | Site: KNEE | Status: FUNCTIONAL

## 2022-06-21 DEVICE — COMPONENT PAT DIA32MM THK8.5MM KNEE POLY CEM CONVENTIONAL: Type: IMPLANTABLE DEVICE | Site: KNEE | Status: FUNCTIONAL

## 2022-06-21 RX ORDER — MIDAZOLAM HYDROCHLORIDE 1 MG/ML
INJECTION INTRAMUSCULAR; INTRAVENOUS
Status: COMPLETED
Start: 2022-06-21 | End: 2022-06-21

## 2022-06-21 RX ORDER — MORPHINE SULFATE 2 MG/ML
2 INJECTION, SOLUTION INTRAMUSCULAR; INTRAVENOUS EVERY 5 MIN PRN
Status: DISCONTINUED | OUTPATIENT
Start: 2022-06-21 | End: 2022-06-21 | Stop reason: HOSPADM

## 2022-06-21 RX ORDER — FAMOTIDINE 20 MG/1
20 TABLET, FILM COATED ORAL ONCE
Status: DISCONTINUED | OUTPATIENT
Start: 2022-06-21 | End: 2022-06-21 | Stop reason: HOSPADM

## 2022-06-21 RX ORDER — OXYCODONE HCL 10 MG/1
10 TABLET, FILM COATED, EXTENDED RELEASE ORAL
Status: COMPLETED | OUTPATIENT
Start: 2022-06-21 | End: 2022-06-21

## 2022-06-21 RX ORDER — CEFAZOLIN SODIUM 1 G/3ML
INJECTION, POWDER, FOR SOLUTION INTRAMUSCULAR; INTRAVENOUS PRN
Status: DISCONTINUED | OUTPATIENT
Start: 2022-06-21 | End: 2022-06-21 | Stop reason: SDUPTHER

## 2022-06-21 RX ORDER — SODIUM CHLORIDE 0.9 % (FLUSH) 0.9 %
5-40 SYRINGE (ML) INJECTION EVERY 12 HOURS SCHEDULED
Status: DISCONTINUED | OUTPATIENT
Start: 2022-06-21 | End: 2022-06-21 | Stop reason: HOSPADM

## 2022-06-21 RX ORDER — OXYCODONE HYDROCHLORIDE 5 MG/1
5 TABLET ORAL
Status: COMPLETED | OUTPATIENT
Start: 2022-06-21 | End: 2022-06-21

## 2022-06-21 RX ORDER — LIDOCAINE HYDROCHLORIDE 10 MG/ML
INJECTION, SOLUTION EPIDURAL; INFILTRATION; INTRACAUDAL; PERINEURAL PRN
Status: DISCONTINUED | OUTPATIENT
Start: 2022-06-21 | End: 2022-06-21 | Stop reason: SDUPTHER

## 2022-06-21 RX ORDER — TRANEXAMIC ACID 650 1/1
1950 TABLET ORAL
Status: COMPLETED | OUTPATIENT
Start: 2022-06-21 | End: 2022-06-21

## 2022-06-21 RX ORDER — SODIUM CHLORIDE 9 MG/ML
INJECTION, SOLUTION INTRAVENOUS PRN
Status: DISCONTINUED | OUTPATIENT
Start: 2022-06-21 | End: 2022-06-21 | Stop reason: HOSPADM

## 2022-06-21 RX ORDER — SODIUM CHLORIDE, SODIUM LACTATE, POTASSIUM CHLORIDE, CALCIUM CHLORIDE 600; 310; 30; 20 MG/100ML; MG/100ML; MG/100ML; MG/100ML
INJECTION, SOLUTION INTRAVENOUS CONTINUOUS PRN
Status: DISCONTINUED | OUTPATIENT
Start: 2022-06-21 | End: 2022-06-21 | Stop reason: SDUPTHER

## 2022-06-21 RX ORDER — ACETAMINOPHEN 500 MG
1000 TABLET ORAL ONCE
Status: COMPLETED | OUTPATIENT
Start: 2022-06-21 | End: 2022-06-21

## 2022-06-21 RX ORDER — SODIUM CHLORIDE, SODIUM LACTATE, POTASSIUM CHLORIDE, CALCIUM CHLORIDE 600; 310; 30; 20 MG/100ML; MG/100ML; MG/100ML; MG/100ML
INJECTION, SOLUTION INTRAVENOUS CONTINUOUS
Status: DISCONTINUED | OUTPATIENT
Start: 2022-06-21 | End: 2022-06-21 | Stop reason: HOSPADM

## 2022-06-21 RX ORDER — IBUPROFEN 400 MG/1
400 TABLET ORAL EVERY 8 HOURS PRN
Qty: 30 TABLET | Refills: 0 | Status: SHIPPED | OUTPATIENT
Start: 2022-06-21

## 2022-06-21 RX ORDER — CELECOXIB 100 MG/1
100 CAPSULE ORAL ONCE
Status: DISCONTINUED | OUTPATIENT
Start: 2022-06-21 | End: 2022-06-21

## 2022-06-21 RX ORDER — OXYCODONE HYDROCHLORIDE 5 MG/1
5 TABLET ORAL EVERY 4 HOURS PRN
Qty: 30 TABLET | Refills: 0 | Status: SHIPPED | OUTPATIENT
Start: 2022-06-21 | End: 2022-06-24

## 2022-06-21 RX ORDER — LIDOCAINE HYDROCHLORIDE 10 MG/ML
1 INJECTION, SOLUTION EPIDURAL; INFILTRATION; INTRACAUDAL; PERINEURAL
Status: DISCONTINUED | OUTPATIENT
Start: 2022-06-21 | End: 2022-06-21 | Stop reason: HOSPADM

## 2022-06-21 RX ORDER — PROPOFOL 10 MG/ML
INJECTION, EMULSION INTRAVENOUS PRN
Status: DISCONTINUED | OUTPATIENT
Start: 2022-06-21 | End: 2022-06-21 | Stop reason: SDUPTHER

## 2022-06-21 RX ORDER — MORPHINE SULFATE 4 MG/ML
4 INJECTION, SOLUTION INTRAMUSCULAR; INTRAVENOUS EVERY 5 MIN PRN
Status: DISCONTINUED | OUTPATIENT
Start: 2022-06-21 | End: 2022-06-21 | Stop reason: HOSPADM

## 2022-06-21 RX ORDER — SCOLOPAMINE TRANSDERMAL SYSTEM 1 MG/1
1 PATCH, EXTENDED RELEASE TRANSDERMAL ONCE
Status: DISCONTINUED | OUTPATIENT
Start: 2022-06-21 | End: 2022-06-21 | Stop reason: HOSPADM

## 2022-06-21 RX ORDER — MEPERIDINE HYDROCHLORIDE 25 MG/ML
12.5 INJECTION INTRAMUSCULAR; INTRAVENOUS; SUBCUTANEOUS EVERY 5 MIN PRN
Status: DISCONTINUED | OUTPATIENT
Start: 2022-06-21 | End: 2022-06-21 | Stop reason: HOSPADM

## 2022-06-21 RX ORDER — MIDAZOLAM HYDROCHLORIDE 1 MG/ML
2 INJECTION INTRAMUSCULAR; INTRAVENOUS ONCE
Status: DISCONTINUED | OUTPATIENT
Start: 2022-06-21 | End: 2022-06-21 | Stop reason: HOSPADM

## 2022-06-21 RX ORDER — DIPHENHYDRAMINE HYDROCHLORIDE 50 MG/ML
12.5 INJECTION INTRAMUSCULAR; INTRAVENOUS
Status: DISCONTINUED | OUTPATIENT
Start: 2022-06-21 | End: 2022-06-21 | Stop reason: HOSPADM

## 2022-06-21 RX ORDER — ROPIVACAINE HYDROCHLORIDE 5 MG/ML
INJECTION, SOLUTION EPIDURAL; INFILTRATION; PERINEURAL
Status: COMPLETED | OUTPATIENT
Start: 2022-06-21 | End: 2022-06-21

## 2022-06-21 RX ORDER — MIDAZOLAM HYDROCHLORIDE 1 MG/ML
2 INJECTION INTRAMUSCULAR; INTRAVENOUS
Status: COMPLETED | OUTPATIENT
Start: 2022-06-21 | End: 2022-06-21

## 2022-06-21 RX ORDER — MORPHINE SULFATE 10 MG/ML
INJECTION, SOLUTION INTRAMUSCULAR; INTRAVENOUS PRN
Status: DISCONTINUED | OUTPATIENT
Start: 2022-06-21 | End: 2022-06-21 | Stop reason: SDUPTHER

## 2022-06-21 RX ORDER — FENTANYL CITRATE 50 UG/ML
INJECTION, SOLUTION INTRAMUSCULAR; INTRAVENOUS PRN
Status: DISCONTINUED | OUTPATIENT
Start: 2022-06-21 | End: 2022-06-21 | Stop reason: SDUPTHER

## 2022-06-21 RX ORDER — SODIUM CHLORIDE 0.9 % (FLUSH) 0.9 %
5-40 SYRINGE (ML) INJECTION PRN
Status: DISCONTINUED | OUTPATIENT
Start: 2022-06-21 | End: 2022-06-21 | Stop reason: HOSPADM

## 2022-06-21 RX ORDER — DEXAMETHASONE SODIUM PHOSPHATE 10 MG/ML
8 INJECTION, SOLUTION INTRAMUSCULAR; INTRAVENOUS ONCE
Status: DISCONTINUED | OUTPATIENT
Start: 2022-06-21 | End: 2022-06-21 | Stop reason: HOSPADM

## 2022-06-21 RX ORDER — ONDANSETRON 2 MG/ML
INJECTION INTRAMUSCULAR; INTRAVENOUS PRN
Status: DISCONTINUED | OUTPATIENT
Start: 2022-06-21 | End: 2022-06-21 | Stop reason: SDUPTHER

## 2022-06-21 RX ORDER — ROPIVACAINE HYDROCHLORIDE 2 MG/ML
INJECTION, SOLUTION EPIDURAL; INFILTRATION; PERINEURAL PRN
Status: DISCONTINUED | OUTPATIENT
Start: 2022-06-21 | End: 2022-06-21 | Stop reason: HOSPADM

## 2022-06-21 RX ORDER — ROCURONIUM BROMIDE 10 MG/ML
INJECTION, SOLUTION INTRAVENOUS PRN
Status: DISCONTINUED | OUTPATIENT
Start: 2022-06-21 | End: 2022-06-21 | Stop reason: SDUPTHER

## 2022-06-21 RX ORDER — METOCLOPRAMIDE HYDROCHLORIDE 5 MG/ML
10 INJECTION INTRAMUSCULAR; INTRAVENOUS
Status: DISCONTINUED | OUTPATIENT
Start: 2022-06-21 | End: 2022-06-21 | Stop reason: HOSPADM

## 2022-06-21 RX ORDER — DEXAMETHASONE SODIUM PHOSPHATE 10 MG/ML
INJECTION, SOLUTION INTRAMUSCULAR; INTRAVENOUS PRN
Status: DISCONTINUED | OUTPATIENT
Start: 2022-06-21 | End: 2022-06-21 | Stop reason: SDUPTHER

## 2022-06-21 RX ORDER — ROPIVACAINE HYDROCHLORIDE 5 MG/ML
INJECTION, SOLUTION EPIDURAL; INFILTRATION; PERINEURAL
Status: COMPLETED
Start: 2022-06-21 | End: 2022-06-21

## 2022-06-21 RX ADMIN — MIDAZOLAM HYDROCHLORIDE 2 MG: 1 INJECTION, SOLUTION INTRAMUSCULAR; INTRAVENOUS at 14:04

## 2022-06-21 RX ADMIN — MORPHINE SULFATE 10 MG: 10 INJECTION, SOLUTION INTRAMUSCULAR; INTRAVENOUS at 15:52

## 2022-06-21 RX ADMIN — MORPHINE SULFATE 2 MG: 2 INJECTION, SOLUTION INTRAMUSCULAR; INTRAVENOUS at 16:40

## 2022-06-21 RX ADMIN — FENTANYL CITRATE 50 MCG: 50 INJECTION, SOLUTION INTRAMUSCULAR; INTRAVENOUS at 14:26

## 2022-06-21 RX ADMIN — MIDAZOLAM HYDROCHLORIDE 2 MG: 1 INJECTION INTRAMUSCULAR; INTRAVENOUS at 14:04

## 2022-06-21 RX ADMIN — SODIUM CHLORIDE, SODIUM LACTATE, POTASSIUM CHLORIDE, AND CALCIUM CHLORIDE: 600; 310; 30; 20 INJECTION, SOLUTION INTRAVENOUS at 15:31

## 2022-06-21 RX ADMIN — MORPHINE SULFATE 4 MG: 4 INJECTION, SOLUTION INTRAMUSCULAR; INTRAVENOUS at 16:11

## 2022-06-21 RX ADMIN — ROPIVACAINE HYDROCHLORIDE 30 ML: 5 INJECTION, SOLUTION EPIDURAL; INFILTRATION; PERINEURAL at 14:07

## 2022-06-21 RX ADMIN — ROCURONIUM BROMIDE 50 MG: 10 INJECTION, SOLUTION INTRAVENOUS at 14:26

## 2022-06-21 RX ADMIN — CEFAZOLIN 2000 MG: 2 INJECTION, POWDER, FOR SOLUTION INTRAMUSCULAR; INTRAVENOUS at 14:23

## 2022-06-21 RX ADMIN — SUGAMMADEX 200 MG: 100 INJECTION, SOLUTION INTRAVENOUS at 15:45

## 2022-06-21 RX ADMIN — MORPHINE SULFATE 2 MG: 2 INJECTION, SOLUTION INTRAMUSCULAR; INTRAVENOUS at 16:54

## 2022-06-21 RX ADMIN — MORPHINE SULFATE 4 MG: 4 INJECTION, SOLUTION INTRAMUSCULAR; INTRAVENOUS at 16:24

## 2022-06-21 RX ADMIN — LIDOCAINE HYDROCHLORIDE 50 MG: 10 INJECTION, SOLUTION EPIDURAL; INFILTRATION; INTRACAUDAL; PERINEURAL at 14:26

## 2022-06-21 RX ADMIN — TRANEXAMIC ACID 1950 MG: 650 TABLET ORAL at 12:44

## 2022-06-21 RX ADMIN — OXYCODONE HYDROCHLORIDE 10 MG: 10 TABLET, FILM COATED, EXTENDED RELEASE ORAL at 12:43

## 2022-06-21 RX ADMIN — PROPOFOL 150 MG: 10 INJECTION, EMULSION INTRAVENOUS at 14:26

## 2022-06-21 RX ADMIN — SODIUM CHLORIDE, POTASSIUM CHLORIDE, SODIUM LACTATE AND CALCIUM CHLORIDE: 600; 310; 30; 20 INJECTION, SOLUTION INTRAVENOUS at 12:45

## 2022-06-21 RX ADMIN — SODIUM CHLORIDE, SODIUM LACTATE, POTASSIUM CHLORIDE, AND CALCIUM CHLORIDE: 600; 310; 30; 20 INJECTION, SOLUTION INTRAVENOUS at 14:17

## 2022-06-21 RX ADMIN — OXYCODONE 5 MG: 5 TABLET ORAL at 17:50

## 2022-06-21 RX ADMIN — DEXAMETHASONE SODIUM PHOSPHATE 10 MG: 10 INJECTION, SOLUTION INTRAMUSCULAR; INTRAVENOUS at 14:26

## 2022-06-21 RX ADMIN — ONDANSETRON 4 MG: 2 INJECTION INTRAMUSCULAR; INTRAVENOUS at 14:26

## 2022-06-21 RX ADMIN — ACETAMINOPHEN 1000 MG: 500 TABLET ORAL at 12:44

## 2022-06-21 RX ADMIN — FENTANYL CITRATE 50 MCG: 50 INJECTION, SOLUTION INTRAMUSCULAR; INTRAVENOUS at 14:48

## 2022-06-21 ASSESSMENT — PAIN DESCRIPTION - DESCRIPTORS
DESCRIPTORS: ACHING

## 2022-06-21 ASSESSMENT — PAIN DESCRIPTION - ORIENTATION
ORIENTATION: LEFT

## 2022-06-21 ASSESSMENT — LIFESTYLE VARIABLES: SMOKING_STATUS: 0

## 2022-06-21 ASSESSMENT — PAIN DESCRIPTION - LOCATION
LOCATION: KNEE

## 2022-06-21 ASSESSMENT — PAIN SCALES - GENERAL
PAINLEVEL_OUTOF10: 7
PAINLEVEL_OUTOF10: 0
PAINLEVEL_OUTOF10: 6
PAINLEVEL_OUTOF10: 7
PAINLEVEL_OUTOF10: 4
PAINLEVEL_OUTOF10: 5
PAINLEVEL_OUTOF10: 6

## 2022-06-21 ASSESSMENT — PAIN DESCRIPTION - ONSET: ONSET: PROGRESSIVE

## 2022-06-21 ASSESSMENT — ENCOUNTER SYMPTOMS: SHORTNESS OF BREATH: 0

## 2022-06-21 ASSESSMENT — PAIN DESCRIPTION - FREQUENCY: FREQUENCY: CONTINUOUS

## 2022-06-21 ASSESSMENT — PAIN - FUNCTIONAL ASSESSMENT: PAIN_FUNCTIONAL_ASSESSMENT: 0-10

## 2022-06-21 ASSESSMENT — PAIN DESCRIPTION - PAIN TYPE: TYPE: SURGICAL PAIN

## 2022-06-21 NOTE — ANESTHESIA PRE PROCEDURE
Department of Anesthesiology  Preprocedure Note       Name:  Regine Vásquez   Age:  74 y.o.  :  1955                                          MRN:  [id number]         Date:  2022      Surgeon: Araceli Broderick):  Juan Carlos Lopez MD    Procedure: Procedure(s):  LEFT KNEE TOTAL ARTHROPLASTY    Medications prior to admission:   Prior to Admission medications    Medication Sig Start Date End Date Taking? Authorizing Provider   ibuprofen (ADVIL;MOTRIN) 400 MG tablet Take 1 tablet by mouth every 8 hours as needed for Pain 22  Yes Juan Carlos Lopez MD   oxyCODONE (ROXICODONE) 5 MG immediate release tablet Take 1 tablet by mouth every 4 hours as needed for Pain for up to 3 days. 22 Yes Juan Carlos Lopez MD   magnesium oxide (MAG-OX) 400 MG tablet Take 400 mg by mouth daily    Historical Provider, MD   levothyroxine (SYNTHROID) 88 MCG tablet Take 88 mcg by mouth Daily    Historical Provider, MD   warfarin (COUMADIN) 4 MG tablet Take 4 mg by mouth Daily with supper     Historical Provider, MD   pantoprazole (PROTONIX) 40 MG tablet Take 1 tablet by mouth every morning (before breakfast) 21   IMELDA Norwood   Cholecalciferol (VITAMIN D3) 50 MCG ( UT) CAPS Take 1,000 Units by mouth 2 times daily    Historical Provider, MD   potassium chloride (KLOR-CON M) 20 MEQ extended release tablet Take 20 mEq by mouth 2 times daily  21   Historical Provider, MD   simvastatin (ZOCOR) 40 MG tablet Take 40 mg by mouth nightly  5/3/21   Historical Provider, MD   calcium carbonate (TUMS) 500 MG chewable tablet Take 3 tablets by mouth 2 times daily     Historical Provider, MD   ondansetron (ZOFRAN) 4 MG tablet TAKE 1 TABLET 30 MIN BEFORE EACH DOSE OF BOWEL PREP.  THEN EVERY 6 HOURS A NEEDED  Patient not taking: Reported on 2022   Yadi Redding MD   Triamterene-HCTZ (MAXZIDE PO) Take 37.5 mg by mouth daily Pt takes 37.5/25 mg daily    Historical Provider, MD FLUoxetine (PROZAC) 20 MG capsule Take 20 mg by mouth daily    Historical Provider, MD   pregabalin (LYRICA) 50 MG capsule Take 50 mg by mouth 2 times daily. Historical Provider, MD       Current medications:    Current Facility-Administered Medications   Medication Dose Route Frequency Provider Last Rate Last Admin    midazolam (VERSED) 2 MG/2ML injection             ropivacaine (NAROPIN) 0.5% injection             scopolamine (TRANSDERM-SCOP) transdermal patch 1 patch  1 patch TransDERmal Once Michi Quintanilla MD   1 patch at 06/21/22 1244    dexamethasone (PF) (DECADRON) injection 8 mg  8 mg IntraVENous Once Michi Quintanilla MD        lactated ringers infusion   IntraVENous Continuous Michi Quintanilla  mL/hr at 06/21/22 1245 New Bag at 06/21/22 1245    sodium chloride flush 0.9 % injection 5-40 mL  5-40 mL IntraVENous 2 times per day Michi Quintanilla MD        sodium chloride flush 0.9 % injection 5-40 mL  5-40 mL IntraVENous PRN Michi Quintanilla MD        0.9 % sodium chloride infusion   IntraVENous PRN Michi Quintanilla MD        ceFAZolin (ANCEF) 2,000 mg in sterile water 20 mL IV syringe  2,000 mg IntraVENous On Call to OR Michi Quintanilla MD        midazolam (VERSED) injection 2 mg  2 mg IntraVENous Once Tani Negro MD           Allergies:     Allergies   Allergen Reactions    Bacitracin-Polymyxin B Hives, Itching and Rash    Macrobid [Nitrofurantoin] Itching and Rash    Neomycin-Bacitracin-Polymyxin [Bacitracin-Neomycin-Polymyxin] Hives, Itching and Rash    Sulfa Antibiotics Hives, Itching and Rash       Problem List:    Patient Active Problem List   Diagnosis Code    Warfarin anticoagulation Z79.01    Factor V Leiden mutation ([facility] D68.51    Essential hypertension I10    Lupus anticoagulant disorder ([facility] D68.62    Protein S deficiency ([facility] D68.59    Gastroesophageal reflux disease with esophagitis and hemorrhage K21.01    GI bleed K92.2    Acute blood loss anemia D62    Laceration of nose S01. 21XA    Closed fracture of nasal bones S02. 2XXA    Ulcer of esophagus without bleeding K22.10    Gastric ulcer without hemorrhage or perforation K25.9    Chronic diarrhea K52.9    Anemia D64.9    Personal history of colonic polyps Z86.010    Iron deficiency anemia D50.9    Malabsorption due to intolerance, not elsewhere classified K90.49    Presence of IVC filter Z95.828    Acute deep vein thrombosis (DVT) of proximal vein of lower extremity (HCC) I82.4Y9       Past Medical History:        Diagnosis Date    Arthritis     Chronic back pain     pain management    Depression     Factor 5 Leiden mutation, heterozygous ([address]     Gastric ulcer     GERD (gastroesophageal reflux disease)     GI bleed     History of blood transfusion     Hx of blood clots     Hyperlipidemia     Hypertension     mild    Knee pain     Nasal fracture     closed; no surgery or reduction needed    Pulmonary embolism (HCC)     Thyroid disease     Warfarin anticoagulation        Past Surgical History:        Procedure Laterality Date    BLADDER SUSPENSION      2013, done with hysterectomy    COLONOSCOPY  approx 2017    [facility]:  \"polyps repeat in 1 yr\" per pt recall    COLONOSCOPY N/A 06/25/2021    Dr Kami Christopher, polyp, redundant tortuous colon, int hemorrhoids, 5 year recall    ENDOSCOPY, COLON, DIAGNOSTIC      HYSTERECTOMY (CERVIX STATUS UNKNOWN)  2013    HYSTERECTOMY, VAGINAL  2013    done while bladder surgery being done    IVC FILTER INSERTION      RECTOCELE REPAIR      2013 with hysterectomy    THYROIDECTOMY      2013    UPPER GASTROINTESTINAL ENDOSCOPY N/A 04/28/2021    Dr Dos Santos-Nonbleeding 8 mm distal esophageal ulcer secondary to GERD, 1.2 cm antral ulcer, 1 cm inflammatory antral polyp-HP, gastritis    UPPER GASTROINTESTINAL ENDOSCOPY N/A 06/25/2021    Dr Mcguire [facility]- 3-4cm , esophagitis , gastric polyps; NEG h pylori, NEG barretts (however had the endoscopic appearance , so pt was given a 3 yr recall for re-evaluation)       Social History:    Social History     Tobacco Use    Smoking status: Never Smoker    Smokeless tobacco: Never Used   Substance Use Topics    Alcohol use: Not Currently                                Counseling given: Not Answered      Vital Signs (Current):   Vitals:    06/21/22 1225   BP: (!) 166/97   Pulse: 66   Resp: 16   Temp: 98.6 °F (37 °C)   TempSrc: Tympanic   SpO2: 98%   Weight: 205 lb (93 kg)   Height: 5' 1\" (1.549 m)                                              BP Readings from Last 3 Encounters:   06/21/22 (!) 166/97   01/20/22 130/70   01/13/22 137/74       NPO Status: Time of last liquid consumption: 0000                        Time of last solid consumption: 0000                        Date of last liquid consumption: 06/20/22                        Date of last solid food consumption: 06/20/22    BMI:   Wt Readings from Last 3 Encounters:   06/21/22 205 lb (93 kg)   06/10/22 205 lb (93 kg)   01/20/22 192 lb (87.1 kg)     Body mass index is 38.73 kg/m². CBC:   Lab Results   Component Value Date    WBC 4.1 06/10/2022    RBC 3.95 06/10/2022    HGB 12.1 06/10/2022    HCT 38.1 06/10/2022    MCV 96.5 06/10/2022    RDW 12.3 06/10/2022     06/10/2022       CMP:   Lab Results   Component Value Date     06/10/2022    K 4.4 06/10/2022    K 3.6 04/30/2021     06/10/2022    CO2 29 06/10/2022    BUN 15 06/10/2022    CREATININE 0.8 06/10/2022    GFRAA >59 06/10/2022    AGRATIO 1.1 08/19/2021    LABGLOM >60 06/10/2022    GLUCOSE 86 06/10/2022    PROT 7.1 08/19/2021    PROT 6.8 10/09/2012    CALCIUM 7.5 06/10/2022    BILITOT <0.2 04/27/2021    ALKPHOS 50 04/27/2021    AST 17 04/27/2021    ALT 7 04/27/2021       POC Tests: No results for input(s): POCGLU, POCNA, POCK, POCCL, POCBUN, POCHEMO, POCHCT in the last 72 hours.     Coags:   Lab Results patient. Plan discussed with CRNA.                     Thi Angeles MD   6/21/2022

## 2022-06-21 NOTE — PROGRESS NOTES
CLINICAL PHARMACY NOTE: MEDS TO BEDS    Total # of Prescriptions Filled: 2   The following medications were delivered to the patient:  · Ibuprofen 400mg  · Oxycodone 5mg    Additional Documentation:    Delivered to patients family in waiting area, paid cash.

## 2022-06-21 NOTE — H&P
[facility] [place] ([facility]) Pre-Operative History and Physical    Patient Name: Stacey Lyles  : 1955        Chief Complaint: Left knee pain  History of Present Illness: This patient has had ongoing pain for several weeks/months that has been unresponsive to conservative care which has included injection, therapy, activity modification and presents now for surgery.     Past Medical History:       Diagnosis Date    Arthritis     Chronic back pain     pain management    Depression     Factor [facility] Leiden mutation[facility] ([facility]     Gastric ulcer     GERD (gastroesophageal reflux disease)     GI bleed     History of blood transfusion     Hx of blood clots     Hyperlipidemia     Hypertension     mild    Knee pain     Nasal fracture     closed; no surgery or reduction needed    Pulmonary embolism ([facility]     Thyroid disease     Warfarin anticoagulation      Past Surgical History:       Procedure Laterality Date    BLADDER SUSPENSION      , done with hysterectomy    COLONOSCOPY  approx 2017    [facility]:  \"polyps repeat in 1 yr\" per pt recall    COLONOSCOPY N/A 2021    Dr Teri Davis, polyp, redundant tortuous colon, int hemorrhoids, 5 year recall    ENDOSCOPY, COLON, DIAGNOSTIC      HYSTERECTOMY (CERVIX STATUS UNKNOWN)      HYSTERECTOMY, VAGINAL      done while bladder surgery being done    IVC [address]       with hysterectomy    THYROIDECTOMY          UPPER GASTROINTESTINAL ENDOSCOPY N/A 2021    Dr Dos Santos-Nonbleeding 8 mm distal esophageal ulcer secondary to GERD, 1.2 cm antral ulcer, 1 cm inflammatory antral polyp-HP, gastritis    UPPER GASTROINTESTINAL ENDOSCOPY N/A 2021    Dr Jaime Montalvo- 3-4cm HH, esophagitis , gastric polyps; NEG h pylori, NEG barretts (however had the endoscopic appearance , so pt was given a 3 yr recall for re-evaluation)       Medications:   Prior to Admission medications Medication Sig Start Date End Date Taking? Authorizing Provider   magnesium oxide (MAG-OX) 400 MG tablet Take 400 mg by mouth daily    Historical Provider, MD   levothyroxine (SYNTHROID) 88 MCG tablet Take 88 mcg by mouth Daily    Historical Provider, MD   warfarin (COUMADIN) 4 MG tablet Take 4 mg by mouth Daily with supper     Historical Provider, MD   pantoprazole (PROTONIX) 40 MG tablet Take 1 tablet by mouth every morning (before breakfast) 9/29/21   IMELDA Olson   Cholecalciferol (VITAMIN D3) 50 MCG (2000 UT) CAPS Take 1,000 Units by mouth 2 times daily    Historical Provider, MD   potassium chloride (KLOR-CON M) 20 MEQ extended release tablet Take 20 mEq by mouth 2 times daily  5/8/21   Historical Provider, MD   simvastatin (ZOCOR) 40 MG tablet Take 40 mg by mouth nightly  5/3/21   Historical Provider, MD   calcium carbonate (TUMS) 500 MG chewable tablet Take 3 tablets by mouth 2 times daily     Historical Provider, MD   ondansetron (ZOFRAN) 4 MG tablet TAKE 1 TABLET 30 MIN BEFORE EACH DOSE OF BOWEL PREP. THEN EVERY 6 HOURS A NEEDED  Patient not taking: Reported on 6/21/2022 5/28/21   Thelma Love MD   Triamterene-HCTZ (MAXZIDE PO) Take 37.5 mg by mouth daily Pt takes 37.5/25 mg daily    Historical Provider, MD   FLUoxetine (PROZAC) 20 MG capsule Take 20 mg by mouth daily    Historical Provider, MD   pregabalin (LYRICA) 50 MG capsule Take 50 mg by mouth 2 times daily. Historical Provider, MD   HYDROcodone-acetaminophen (NORCO) 7.5-325 MG per tablet Take 1 tablet by mouth 4 times daily. Historical Provider, MD       Allergies:  Bacitracin-polymyxin b, Macrobid [nitrofurantoin], Neomycin-bacitracin-polymyxin [bacitracin-neomycin-polymyxin], and Sulfa antibiotics    Social History:   Tobacco:  reports that she has never smoked. She has never used smokeless tobacco.   Alcohol:  reports previous alcohol use.     Review of Systems:  General: Denies any fever or chills  EYES: Denies any diplopia  ENT: Tinnitus or vertigo  Resp: Denies any shortness of breath, cough or wheezing  Cardiac: Denies any chest pain, palpitations, claudication or edema  GI: Denies any melena, hematochezia, hematemesis or pyrosis  : Denies any frequency, urgency, hesitancy or incontinence  Musculoskeletal: Denies back pain, joint pain, myalgias  Heme: Denies bruising or bleeding easily  Endocrine: Denies any history of diabetes or thyroid disease  Psych: Denies anxiety or depression  Neuro: Denies any focal motor or sensory deficits      Physical Exam:  Vitals: BP (!) 166/97   Pulse 66   Temp 98.6 °F (37 °C) (Tympanic)   Resp 16   Ht 5' 1\" (1.549 m)   Wt 205 lb (93 kg)   SpO2 98%   BMI 38.73 kg/m²   CONSTITUTIONAL: Alert, appropriate, no acute distress. PSYCH: mood and affect are normal with a normal rate and tone of speech  EYES: Non icteric, EOM intact, pupils equal round and reactive to light  ENT: Mucus membranes moist, no oral pharyngeal lesions, nares patent   NECK: Supple, no masses, no JVD, trachea mid line   CHEST/LUNGS: CTA bilaterally, normal respiratory effort   CARDIOVASCULAR: RRR, no murmurs,  2+ DP and radial pulses bilaterally  ABDOMEN: soft, nontender  EXTREMITIES: warm, well perfused, no edema. Left knee joint with mildly reduced range of motion and generalized tenderness.   Neurovascular exam normal  SKIN: warm, dry with no rashes or lesions  LYMPH: No cervical or inguinal lymphadenopathy    RADIOLOGY: xrays of left knee show severe knee arthritis  LABORATORY:    CBC :    Lab Results   Component Value Date    WBC 4.1 06/10/2022    HGB 12.1 06/10/2022    HCT 38.1 06/10/2022     06/10/2022     BMP:   Lab Results   Component Value Date     06/10/2022    K 4.4 06/10/2022    K 3.6 04/30/2021     06/10/2022    CO2 29 06/10/2022    BUN 15 06/10/2022    LABALBU 3.7 04/27/2021    CREATININE 0.8 06/10/2022    CALCIUM 7.5 06/10/2022    GFRAA >59 06/10/2022    LABGLOM >60 06/10/2022 GLUCOSE 86 06/10/2022     PT/INR:    Lab Results   Component Value Date    PROTIME 11.8 06/21/2022    INR 0.88 06/21/2022     U/A: No results found for: NITRITE, WBCUA, RBCUA, BACTERIA  HgBA1c:  No components found for: HGBA1C    IMPRESSION:   Left primary knee osteoarthritis. Most recent office note reviewed and there has been no change in health status. PLAN: Left knee replacement. I explained to the patient/family the patient's diagnosis and operative procedure in detail. They said they understood basically what was wrong and how I planned to fix it. They understand the expected recovery and the risks which include excessive bleeding, infection, reaction to anesthesia, nerve injury, stiffness, fracture, deformity and dislocation. They then signed an operative consent form. Provider:  Monique Israel MD  Date: 6/21/2022

## 2022-06-21 NOTE — OP NOTE
TOTAL KNEE ARTHROPLASTY OPERATIVE NOTE    NAME OF SURGEON / : Moncho Smith MD  PATIENT:   Paola Cowart  Date: 6/21/2022        Time: 3:48 PM   Referring Physician: ________________________    PREOP DIAGNOSIS:  left Knee  Primary osteoarthritis   POSTOP DIAGNOSIS:  Same     PROCEDURE:    Left  Cemented Posterior Stabilized Knee arthroplasty    IMPLANTS:   Implant Name Type Inv. Item Serial No.  Lot No. LRB No. Used Action   CEMENT BNE 40GM HI VISC RADPQ FOR REV SURG - [id number]  CEMENT BNE 40GM HI VISC RADPQ FOR REV SURG  [employer] [id number] Left 2 Implanted   EXTENSION STEM L30MM [id number] KNEE Shanelle Yvonne PERSONA - [id number]  EXTENSION STEM L30MM [id number] KNEE Shanelle Yvonne PERSONA  [employer] [id number] Left 1 Implanted   COMPONENT PAT [id number] THK8. 5MM KNEE POLY RUDDY CONVENTIONAL - [id number]  COMPONENT PAT [id number] THK8. 5MM KNEE POLY RUDDY CONVENTIONAL  [employer] [id number] Left 1 Implanted   PSN TIB STM 5 DEG SZ D L - [id number]  PSN TIB STM 5 DEG SZ D L  [employer]WD [id number] Left 1 Implanted   COMPONENT FEM SZ 8 DERRICK L KNEE CO CHROM RUDDY POST STBL COR - [id number]  COMPONENT FEM SZ 8 DERRICK L KNEE CO CHROM RUDDY POST STBL COR  [employer] [id number] Left 1 Implanted   COMPONENT PAT [id number] THK8. 5MM KNEE POLY RUDDY CONVENTIONAL - [id number]  COMPONENT PAT [id number] THK8. 5MM KNEE POLY RUDDY CONVENTIONAL  [employer] [id number] Left 1 Implanted   COMPONENT ARTC SURF PS 6-9 CD 11 MM LT TIB FIX BEAR - [id number]  COMPONENT ARTC SURF PS 6-9 CD 11 MM LT TIB FIX BEAR  [employer] [id number] Left 1 Implanted       FINDINGS:  Preop ROM:  Full except for   -  full  extension  Alignment:    varus  Bone quality:   normal  Cartilage wear:  Medial  severe  Lateral  mild  Pat-fem moderate  ACL Intact    ASSISTANT: Jarrod Davenport, certified first assistant. Helped with draping, exposure, retraction, essential steps of the procedure, and with wound closure.    ANESTHESIA:  General  EBL: 500 mL  TOURNIQUET:  none  FLUIDS: See anesthesia record  BLOOD PRODUCTS:  None  COMPLICATIONS:  None  SPECIMEN:  None            INDICATIONS:  Patient presents for the above procedure having failed conservative treatment. Patient consents to the procedure above understanding the risks of bleeding, infection, anesthesia, nerve injury, stiffness, and blood clots. PROCEDURE:  The patient was brought to the operating room and placed in a supine position on the operating table. Anesthesia as specified above was placed. An antiobiotic was given IV. The unoperated extremities were well padded. A tourniquet was placed around the proximal thigh. The lower extremity was prepped with chlorhexidene/alcohol and draped sterilely using ioband barriers. A time out was performed. An anterior knee incision was made and fasciocutaneous flaps were developed. A mini midvastus approach was made and the patella subluxed. The prepatellar fat pad, ACL, and the anterior horns of the menisci were excised. A starter drill was placed down the femoral shaft 1 cm anterior to the PCL origin. An alignment janet was placed down the femoral shaft. The distal femoral cutting guide, set at 5 degrees of valgus was then placed over the alignment janet, and pinned perpendicular to the AP axis. The cutting block was then set to remove an extra 1 mm of distal femur and the distal cut made. The medial bone cut thickness was 10 mm. Hohmans and a PCL retractor were placed around the tibia. The external alignment guide set to cut 10 mm off the intact side at 7° degrees posterior slope was pinned in place. I stepped back and verified that the guide followed the anterior cortex of the tibia to the ankle. The tibial cut was made. Its thickness was 10 mm. The tibial fragment and osteophytes were removed. Posterior meniscal horns were resected. The extension gap was satisfactory.      The epicondylar and AP femoral axes were marked with electrocautery. Femoral trials were laid on the distal femur to estimate the appropriate size. The femoral sizer, with posterior paddles set at 3 degrees of external rotation, and an anterior stylus was placed. The sizer reading matched the size predicted by the trial.   The pinholes were drilled for the 4 in 1 femoral cutting block. This block was impacted on the distal femur and sat flush with the proper rotation relative to the AP and epicondylar axes. A drill was advanced through the anterior slot to check for notching. The femoral block was fixed with medial and lateral screws and the remaining femoral cuts were made. Femoral osteophytes were removed with a rongeur. The flexion gap was satisfactory. Posterior femoral osteophytes were removed with a 3/4 inch curved osteotome and rongeur. The appropriate tibial post punch guide covered the tibia without overhang and sat flush. It was lined up with the medial third of the tibial tubercle. The tibia was reamed, then the keel punched. The femoral trial was impacted onto the femur. The box cut was made first with a recip saw and finished with regular saw. The insert for the box was placed. A 10 mm thick, posterior stabilized tibial liner was snapped in place and ROM and stability were checked. The knee had full ROM and symmetric varus/valgus stability in flexion and extension after    *release of  NO RELEASES  * no extra resection was needed   *the appropriate size tibial liner was placed (see above)    Note: Stability to varus/valgus stress(mm):  Extension ( 1  ,  1 ) Mid Flexion (  2  ,  1  ) Flexion (  1  ,  1  )  *A CPS poly was notused to improve stability      The maximum patella thickness was 20 mm. The patella as resected with an oscillating saw and consistent thickness verified with caliper. The trial patella was placed and the overall thickness was restored to 21 mm.        A femoral  bone plug placed, the femoral lug holes drilled and the trials were removed. The surfaces were rinsed with pulse lavage and dried. Two batches of cement  were mixed. Appropriate sized implants were cemented in place, a trial tibial liner placed, and the knee held in full extension until cement hardened. The capsule was injected with Ropivacaine. Excess cement was removed, and the actual tibial liner was snapped in place. No thumbs patella tracking was checked. No release was needed. Hemostasis was achieved with electrocautery. The wound was copiously irrigated with antibiotic irrigation. The capsule was closed with interrupted #2 vicryl. Subcutaneous tissue was closed with running 2-0 vicryl. Skin was closed with 3-0 vicryl and Prineo. A sterile dressing was applied. The patient was awakened, extubated and transferred to the recovery room in stable condition.             PLAN:  Full weight bearing, ROM, dvt prophylaxis      Electronically signed by Elisa Marshall MD on 6/21/2022 at 3:48 PM

## 2022-06-21 NOTE — ANESTHESIA POSTPROCEDURE EVALUATION
Department of Anesthesiology  Postprocedure Note    Patient: River Gaines  MRN: [id number]  YOB: 1955  Date of evaluation: 6/21/2022      Procedure Summary     Date: 06/21/22 Room / Location: [address]    Anesthesia Start: 5279 Anesthesia Stop: 5700    Procedure: LEFT KNEE TOTAL ARTHROPLASTY (Left Knee) Diagnosis:       Primary osteoarthritis of left knee      (M17.12)    Surgeons: Rashad Myers MD Responsible Provider: IMELDA Harper CRNA    Anesthesia Type: regional, general ASA Status: 3          Anesthesia Type: No value filed. Naveed Phase I: Naveed Score: 8    Naveed Phase II:      Last vitals:   Vitals Value Taken Time   /71 06/21/22 1606   Temp 97.4 °F (36.3 °C) 06/21/22 1607   Pulse 81 06/21/22 1607   Resp 9 06/21/22 1607   SpO2 80 % 06/21/22 1607   Vitals shown include unvalidated device data.       Anesthesia Post Evaluation    Patient location during evaluation: bedside  Patient participation: complete - patient participated  Level of consciousness: awake and alert  Pain score: 0  Airway patency: patent  Nausea & Vomiting: no nausea  Complications: no  Cardiovascular status: hemodynamically stable  Respiratory status: acceptable  Hydration status: euvolemic

## 2022-06-21 NOTE — DISCHARGE INSTR - DIET
Good nutrition is important when healing from an illness, injury, or surgery. Follow any nutrition recommendations given to you during your hospital stay. If you were given an oral nutrition supplement while in the hospital, continue to take this supplement at home. You can take it with meals, in-between meals, and/or before bedtime. These supplements can be purchased at most local grocery stores, pharmacies, and chain [employer]. If you have any questions about your diet or nutrition, call the hospital and ask for the dietitian.             Low carb / High protein

## 2022-06-21 NOTE — PROGRESS NOTES
Pt ambulated from the bed in room 16 to the wheelchair waiting just outside the room. Pt ambulated with gait belt, standby asst x2, and a walker. Pt did very well,  present. No questions or concerns. Stable for discharge to home.

## 2022-06-21 NOTE — CARE COORDINATION
Angelo Walker RN Ortho Navigator  [phone]    Patient would like dme item to be delivered to [address] [place] [address] [address]. Patient is scheduled to discharge home today at 2:30 pm.  Please call if you have any questions. Nacho Pichardo  6/21/2022 11:46 AM   Admission  Description: 74year old female Department: [facility] OR       Patient Demographics    Name Patient ID SSN Gender Identity Birth Date   Tano Read [id number] Female [birth year] (74 yrs)     Address Phone Email     [address] [place], [place] [address] [address] [phone] (G)   [phone] (L)   [phone] (M) [email or web address]       Reg Status PCP Date Last Verified Next Review Date    Verified Ismael Schulte  [phone] 06/10/22 07/10/22        Insurance Payors as of 6/21/2022      MEDICARE    Plan: MEDICARE PART A AND B Member: [id number] Effective from: 9/1/2018   Subscriber: Ralph Patel Subscriber ID: [id number] Guarantor: [address] [place] [address] Drive: [facility] [employer]: Plan G Member: [id number]   Effective from: 9/1/2018 Subscriber: Ralph Patel Subscriber ID: [id number]   Guarantor: Ralph Patel       Emergency Contact(s)    Name Relation Home Work Tubac Massiel Spouse    [facility] [phone]       Electronically signed by Yazmin Egan RN on 6/21/2022 at 11:54 AM

## 2022-06-22 ENCOUNTER — TELEPHONE (OUTPATIENT)
Dept: INPATIENT UNIT | Age: 67
End: 2022-06-22

## 2022-07-07 NOTE — PROGRESS NOTES
Progress Note      Pt Name: Rachel Dexter: 1955  MRN: [id number]    Date of evaluation: 7/8/2022  History Obtained From:  patient, spouse, electronic medical record    CHIEF COMPLAINT:    Chief Complaint   Patient presents with    Follow-up     Iron deficiency anemia due to chronic blood loss     Current active problems  Iron deficiency anemia      HISTORY OF PRESENT ILLNESS:    Payton Linares is a 74 y.o.  female severe anemia with iron deficiency component seen initially in the office on 8/19/2021. She received Injectafer on 8/31 and 9/8/2021. She was found to have ulcerations of the distal esophagus on endoscopy previously noted during her hospitalization. She is on Protonix 40 daily. She denies any melena or any new GI issues. She denies any transfusions since her last visit. She has a history of DVT, heterozygous factor V Leiden, pulmonary embolus and was previously on warfarin but that was discontinued with IVC filter placed when she had her acute GI blood loss. GI bleeding resolved, she is status post IVC filter removal by Dr. Paul Pierce on 1/20/2022. She has severe osteoarthritis and status post a left knee arthroplasty by Dr. Low Alas on 6/21/2022. She was bridged with Lovenox. She is back on her warfarin which is managed by her PCP Dr. Jenni Bernal. She is recovering fairly well. She does have hypothyroidism and is stable on her Synthroid 88 mcg daily. She continues taking Tums daily for hypocalcemia. She is also on potassium replacement for hypokalemia. Blood pressure is stable on her Maxide. She takes Zocor daily. 1st HEMATOLOGY HISTORY: Iron deficiency anemia  Kat Bonds was seen in initial hematology consultation on 8/19/2021 referred by IMELDA Quintero for persistent anemia.     Kat Bonds presented to [facility] earlier this year with a syncopal episode. She was also noted to have been experiencing melena x4 days.   Prior to the admission however she reports that she was feeling more dyspnea with exertion, fatigue for a couple of months.     CBC 4/27/2021 revealed a WBC of 9 with 80% PMN, 14.5% lymphocyte, 4.7% monocyte, Hgb 9.2, MCV 91.5, ,000     Hgb dropped to 6.6 on 4/27/2021.     Serology 4/27/2021  Serum Fe - 122  TIBC - 303  Fe sat - 40%  Ferritin - 33.5  Retic - 2.03% with absolute 0.0554  Haptoglobin - 250     CMP - crt 0.8 GFR > 60, total protein low, total bilirubin normal < 0.2     She had a history of heterozygous factor V Leiden, pulmonary embolus and was on warfarin.     Dr. Charli Ledesma performed an endoscopy on 4/28/2021 as inpatient at [facility] which revealed  · Nonbleeding 8 mm distal esophageal ulcer secondary to GERD  · 1.2 cm antral ulcer, origin of GI bleed which had subsided  · 1 cm inflammatory antral polyp (hyperplastic gastric mucosa with reactive features)     She did receive transfusions of 3 unit packed red blood cell  --  2 separate occasions during her hospitalization.     Dr. Eun Bah placed an IVC filter on 4/28/2021.          Endoscopy per Dr. Shereen Fernando 6/25/2021 revealed  · Abnormal lower third esophagus with 1 to 1.5 cm segments of tonguelike projections (intestinal metaplasia not seen)  · Distal esophageal ring  · 3 to 4 cm in length hiatal hernia without erosions  · Multiple, small, sessile 3 to 4 mm diameter gastric fundic and body polyps     Colonoscopy per Dr. Shereen Fernando 6/25/2021 revealed  8 mm diameter sessile hepatic polyp (tubular adenoma)     Raphael Chi reported that she was taking Ferrex 150 daily for 3 months. She denied any GI upset with the iron replacement.     She denied any melena or any obvious GI bleeding at present.     She continued to have fatigue.     Her initial CBC in the office on 8/19/2021 revealed a WBC of 4.41 with 69.2% PMN, 19.5% lymphocyte, 7.7% monocyte, 1.6% eosinophil, 2% basophil.   Hgb 9.9, MCV 88.3, ,000.     I discussed the fact that her Hgb had continued to slowly improve up to 9.9.     M2A capsule completed on 8/23/2021 was NEGATIVE     I discussed potential need of IV iron replacement if iron panel is still low.     I also discussed the potential need for bone marrow aspiration and biopsy if iron panel is stable, there is no noted acute GI blood loss, and other serology is unrevealing. Serology 8/19/2021  Serum Fe - 39  TIBC - 270  Fe sat - 14%  Ferritin - 13.6    B12 - 408  Folate - 6.5    LDH -625 (313-618)    SPEP -no M spike with immunofixation negative  QI - IgG 894, IgA 201, IgM 56  Free serum light chain kappa 20.6 with K/L ratio 1.26    Her iron saturation above was low so I contacted her to see if she wanted to increase her iron to twice a day or take IV iron. She elected parenteral iron replacement. TREATMENT SUMMARY  Injectafer 750 mg IV on 8/31 and 9/8/2021      2nd HEMATOLOGY HISTORY: Pulmonary embolism 1990, heterozygous factor V Leiden  She reports that her diagnosis of PE was initially at [facility] in [address] [facility]. She was treated with warfarin. When she relocated to [place] she was evaluated by hematologist in [facility] around 2005 and was found to have a heterozygous factor V Leiden. She was told that she needed to be on lifelong anticoagulation. She was on Coumadin until she presented to the hospital with a GI bleed 4/27/2021. Dr. Luis Ward did place a removable IVC filter during her acute hospitalization. Dr. Luis Ward remove the IVC filter on 1/20/2022. Her warfarin is managed by her PCP Dr. Yola lCarke.       Past Medical History:   Diagnosis Date    Arthritis     Chronic back pain     pain management    Depression     Factor 5 Leiden mutation, heterozygous ([facility] [address]     Gastric ulcer     GERD (gastroesophageal reflux disease)     GI bleed     History of blood transfusion     Hx of blood clots     Hyperlipidemia     Hypertension     mild    Knee pain     Nasal fracture     closed; no surgery or reduction needed    Pulmonary embolism ([address]  Thyroid disease     Warfarin anticoagulation         Past Surgical History:   Procedure Laterality Date    BLADDER SUSPENSION      2013, done with hysterectomy    COLONOSCOPY  approx 2017    [facility]:  \"polyps repeat in 1 yr\" per pt recall    COLONOSCOPY N/A 06/25/2021    Dr Shanelle Traore, polyp, redundant tortuous colon, int hemorrhoids, 5 year recall    ENDOSCOPY, COLON, DIAGNOSTIC      HYSTERECTOMY (CERVIX STATUS UNKNOWN)  2013    HYSTERECTOMY, VAGINAL  2013    done while bladder surgery being done    [address]C [address]      2013 with hysterectomy    THYROIDECTOMY      2013    TOTAL KNEE ARTHROPLASTY Left 6/21/2022    LEFT KNEE TOTAL ARTHROPLASTY performed by Hemant Corley MD at [facility] ENDOSCOPY N/A 04/28/2021    Dr Dos Santos-Nonbleeding 8 mm distal esophageal ulcer secondary to GERD, 1.2 cm antral ulcer, 1 cm inflammatory antral polyp-HP, gastritis    UPPER GASTROINTESTINAL ENDOSCOPY N/A 06/25/2021    Dr Pacheco Rios- 3-4cm HH, esophagitis , gastric polyps; NEG h pylori, NEG barretts (however had the endoscopic appearance , so pt was given a 3 yr recall for re-evaluation)           Current Outpatient Medications:     ibuprofen (ADVIL;MOTRIN) 400 MG tablet, Take 1 tablet by mouth every 8 hours as needed for Pain, Disp: 30 tablet, Rfl: 0    magnesium oxide (MAG-OX) 400 MG tablet, Take 400 mg by mouth daily, Disp: , Rfl:     levothyroxine (SYNTHROID) 88 MCG tablet, Take 88 mcg by mouth Daily, Disp: , Rfl:     warfarin (COUMADIN) 4 MG tablet, Take 4 mg by mouth Daily with supper , Disp: , Rfl:     pantoprazole (PROTONIX) 40 MG tablet, Take 1 tablet by mouth every morning (before breakfast), Disp: 30 tablet, Rfl: 9    Cholecalciferol (VITAMIN D3) 50 MCG (2000 UT) CAPS, Take 1,000 Units by mouth 2 times daily, Disp: , Rfl:     potassium chloride (KLOR-CON M) 20 MEQ extended release tablet, Take 20 mEq by mouth 2 times daily , Disp: , Rfl:     simvastatin (ZOCOR) 40 MG tablet, Take 40 mg by mouth nightly , Disp: , Rfl:     calcium carbonate (TUMS) 500 MG chewable tablet, Take 3 tablets by mouth 2 times daily , Disp: , Rfl:     Triamterene-HCTZ (MAXZIDE PO), Take 37.5 mg by mouth daily Pt takes 37.5/25 mg daily, Disp: , Rfl:     FLUoxetine (PROZAC) 20 MG capsule, Take 20 mg by mouth daily, Disp: , Rfl:     pregabalin (LYRICA) 50 MG capsule, Take 50 mg by mouth 2 times daily. , Disp: , Rfl:      Allergies   Allergen Reactions    Bacitracin-Polymyxin B Hives, Itching and Rash    Macrobid [Nitrofurantoin] Itching and Rash    Neomycin-Bacitracin-Polymyxin [Bacitracin-Neomycin-Polymyxin] Hives, Itching and Rash    Sulfa Antibiotics Hives, Itching and Rash       Social History     Tobacco Use    Smoking status: Never Smoker    Smokeless tobacco: Never Used   Vaping Use    Vaping Use: Never used   Substance Use Topics    Alcohol use: Not Currently    Drug use: Never       Family History   Problem Relation Age of Onset    Breast Cancer Mother          at age 50 years   Jason Salcedo Heart Attack Father         when pt 1 months old  of a fatal MI - was at age 64 years    No Known Problems Son          in MVA at age 24 years   Jason Salcedo No Known Problems Son     No Known Problems Son     No Known Problems Son     Colon Cancer Neg Hx     Colon Polyps Neg Hx     Esophageal Cancer Neg Hx     Liver Cancer Neg Hx     Rectal Cancer Neg Hx     Stomach Cancer Neg Hx        Subjective   REVIEW OF SYSTEMS:   Review of Systems   Constitutional: Negative for chills, diaphoresis, fatigue, fever and unexpected weight change. HENT: Negative for mouth sores, nosebleeds, sore throat, trouble swallowing and voice change. Eyes: Negative for photophobia, discharge and itching. Respiratory: Negative for cough, shortness of breath and wheezing. Cardiovascular: Negative for chest pain, palpitations and leg swelling. Gastrointestinal: Negative for abdominal distention, abdominal pain, blood in stool, constipation, diarrhea, nausea and vomiting. Endocrine: Positive for heat intolerance. Negative for cold intolerance, polydipsia and polyuria. Genitourinary: Negative for difficulty urinating, dysuria, hematuria and urgency. Musculoskeletal: Positive for arthralgias (Status post left knee total arthroplasty). Negative for back pain, joint swelling and myalgias. Skin: Negative for color change and rash. Neurological: Negative for dizziness, tremors, seizures, syncope and light-headedness. Hematological: Negative for adenopathy. Does not bruise/bleed easily. Psychiatric/Behavioral: Negative for behavioral problems and suicidal ideas. The patient is not nervous/anxious. All other systems reviewed and are negative. Objective   /68   Pulse 73   Ht 5' 1\" (1.549 m)   Wt 200 lb (90.7 kg)   SpO2 96%   BMI 37.79 kg/m²     PHYSICAL EXAM:  Physical Exam  Constitutional:       Appearance: She is well-developed. HENT:      Head: Normocephalic and atraumatic. Eyes:      General: No scleral icterus. Conjunctiva/sclera: Conjunctivae normal.   Neck:      Trachea: No tracheal deviation. Cardiovascular:      Rate and Rhythm: Normal rate and regular rhythm. Heart sounds: Normal heart sounds. No murmur heard. Pulmonary:      Effort: Pulmonary effort is normal. No respiratory distress. Breath sounds: Normal breath sounds. Chest:   Breasts:      Right: No axillary adenopathy or supraclavicular adenopathy. Left: No axillary adenopathy or supraclavicular adenopathy. Abdominal:      General: Bowel sounds are normal. There is no distension. Palpations: Abdomen is soft. Tenderness: There is no abdominal tenderness. Musculoskeletal:         General: Deformity (Incision left knee healing nicely) present. No tenderness. Cervical back: Normal range of motion and neck supple. Lymphadenopathy:      Cervical: No cervical adenopathy. Upper Body:      Right upper body: No supraclavicular or axillary adenopathy. Left upper body: No supraclavicular or axillary adenopathy. Lower Body: No right inguinal adenopathy. No left inguinal adenopathy. Skin:     General: Skin is warm and dry. Findings: No rash. Neurological:      Mental Status: She is alert and oriented to person, place, and time. Coordination: Coordination normal.   Psychiatric:         Behavior: Behavior normal.         Thought Content: Thought content normal.     CBC reviewed by me  Lab Results   Component Value Date    WBC 6.60 07/08/2022    HGB 12.4 07/08/2022    HCT 40.5 07/08/2022    MCV 96.9 (H) 07/08/2022     07/08/2022     Lab Results   Component Value Date    NEUTROABS 4.80 07/08/2022     VISIT DIAGNOSES  1. Iron deficiency anemia due to chronic blood loss    2. Intestinal malabsorption, unspecified type    3. Leukopenia, unspecified type        ASSESSMENT/PLAN:    1. Iron deficiency anemia -distal esophagitis/ulcerations. Normalized with IV iron replacement    She received Injectafer on 8/31/2021 and 9/8/2021. Hgb today is normal 12.4 with MCV 96.9.      2.  Mild basophilia/leukopenia -     She follow-up in the office on 10/1/2021 with WBC noted to have decreased slightly to 3.85 with 2.3% basophil. Peripheral blood smear [facility] 10/1/2021  · Normochromic normocytic anemia with mild anisocytosis, occasional elliptocytes, and rufus cells seen. · Mild leukopenia, overall the neutrophils are hypersegmented. No circulating blasts  · Lymphocytes have heterogenous morphologic features  · Platelets appear slightly decreased in number with no platelet clumps seen    WBC today is normal at 6.6 with 73.1% PMN, 18.1% lymphocyte, 8.8% monocyte. 3.  History of DVT/PE/heterozygous factor V Leiden    Status post IVC filter removal 1/20/2022 by Dr. Luis Ward. PT/INR 17.6/1. 4this will be faxed to her PCP to dose her warfarin      HEALTH MAINTENANCE    · Breast cancer screening. Bilateral screening mammogram at \[facility]"" on 11/5/2021 was BIRADS 2       · Colon cancer screening. Colonoscopy per Dr. Kelle Mcgregor 6/25/2021 revealed 8 mm diameter sessile hepatic polyp (tubular adenoma)     · Cervical/GYN cancer screening. Prior hysterectomy      Immunization History   Administered Date(s) Administered    COVID-19, Moderna, Booster, PF, 50mcg/0.25ml 11/19/2021    COVID-19, Abe Barrigains, Primary or Immunocompromised, PF, 100mcg/0.5mL 12/30/2020, 01/26/2021          Return in about 6 months (around 1/8/2023) for With Augustine Pizarro.      Doretha Cui PA-C  2:09 PM  7/8/2022

## 2022-07-08 ENCOUNTER — HOSPITAL ENCOUNTER (OUTPATIENT)
Dept: INFUSION THERAPY | Age: 67
Discharge: HOME | End: 2022-07-08
Payer: MEDICARE

## 2022-07-08 ENCOUNTER — OFFICE VISIT (OUTPATIENT)
Dept: HEMATOLOGY | Age: 67
End: 2022-07-08
Payer: MEDICARE

## 2022-07-08 VITALS
HEIGHT: 61 IN | DIASTOLIC BLOOD PRESSURE: 68 MMHG | HEART RATE: 73 BPM | BODY MASS INDEX: 37.76 KG/M2 | OXYGEN SATURATION: 96 % | WEIGHT: 200 LBS | SYSTOLIC BLOOD PRESSURE: 120 MMHG

## 2022-07-08 DIAGNOSIS — D64.9 ANEMIA, UNSPECIFIED TYPE: ICD-10-CM

## 2022-07-08 DIAGNOSIS — Z79.01 WARFARIN ANTICOAGULATION: ICD-10-CM

## 2022-07-08 DIAGNOSIS — K90.9 INTESTINAL MALABSORPTION, UNSPECIFIED TYPE: ICD-10-CM

## 2022-07-08 DIAGNOSIS — D69.9 ANTICOAGULANT DISORDER (HCC): Primary | ICD-10-CM

## 2022-07-08 DIAGNOSIS — D50.0 IRON DEFICIENCY ANEMIA DUE TO CHRONIC BLOOD LOSS: Primary | ICD-10-CM

## 2022-07-08 DIAGNOSIS — D72.819 LEUKOPENIA, UNSPECIFIED TYPE: ICD-10-CM

## 2022-07-08 DIAGNOSIS — D68.51 FACTOR V LEIDEN MUTATION (HCC): ICD-10-CM

## 2022-07-08 LAB
HCT VFR BLD CALC: 40.5 % (ref 34.1–44.9)
HEMOGLOBIN: 12.4 G/DL (ref 11.2–15.7)
INTERNATIONAL NORMALIZATION RATIO, POC: 1.4
LYMPHOCYTES ABSOLUTE: 1.2 K/UL (ref 1.18–3.74)
LYMPHOCYTES RELATIVE PERCENT: 18.1 % (ref 19.3–53.1)
MCH RBC QN AUTO: 29.7 PG (ref 25.6–32.2)
MCHC RBC AUTO-ENTMCNC: 30.6 G/DL (ref 32.3–35.5)
MCV RBC AUTO: 96.9 FL (ref 79.4–94.8)
MONOCYTES ABSOLUTE: 0.6 K/UL (ref 0.24–0.82)
MONOCYTES RELATIVE PERCENT: 8.8 % (ref 4.7–12.5)
NEUTROPHILS ABSOLUTE: 4.8 K/UL (ref 1.56–6.13)
NEUTROPHILS RELATIVE PERCENT: 73.1 % (ref 34–71.1)
PDW BLD-RTO: 12.6 % (ref 11.7–14.4)
PLATELET # BLD: 254 K/UL (ref 182–369)
PMV BLD AUTO: 8.5 FL (ref 7.4–10.4)
PROTHROMBIN TIME, POC: 17.6
RBC # BLD: 4.18 M/UL (ref 3.93–5.22)
WBC # BLD: 6.6 K/UL (ref 3.98–10.04)

## 2022-07-08 PROCEDURE — 99211 OFF/OP EST MAY X REQ PHY/QHP: CPT

## 2022-07-08 PROCEDURE — 1090F PRES/ABSN URINE INCON ASSESS: CPT | Performed by: PHYSICIAN ASSISTANT

## 2022-07-08 PROCEDURE — G8417 CALC BMI ABV UP PARAM F/U: HCPCS | Performed by: PHYSICIAN ASSISTANT

## 2022-07-08 PROCEDURE — 1036F TOBACCO NON-USER: CPT | Performed by: PHYSICIAN ASSISTANT

## 2022-07-08 PROCEDURE — 85610 PROTHROMBIN TIME: CPT | Performed by: PHYSICIAN ASSISTANT

## 2022-07-08 PROCEDURE — G8400 PT W/DXA NO RESULTS DOC: HCPCS | Performed by: PHYSICIAN ASSISTANT

## 2022-07-08 PROCEDURE — 1123F ACP DISCUSS/DSCN MKR DOCD: CPT | Performed by: PHYSICIAN ASSISTANT

## 2022-07-08 PROCEDURE — G8428 CUR MEDS NOT DOCUMENT: HCPCS | Performed by: PHYSICIAN ASSISTANT

## 2022-07-08 PROCEDURE — 85025 COMPLETE CBC W/AUTO DIFF WBC: CPT

## 2022-07-08 PROCEDURE — 99213 OFFICE O/P EST LOW 20 MIN: CPT | Performed by: PHYSICIAN ASSISTANT

## 2022-07-08 PROCEDURE — 3017F COLORECTAL CA SCREEN DOC REV: CPT | Performed by: PHYSICIAN ASSISTANT

## 2022-07-08 ASSESSMENT — ENCOUNTER SYMPTOMS
VOMITING: 0
NAUSEA: 0
EYE ITCHING: 0
COUGH: 0
CONSTIPATION: 0
WHEEZING: 0
VOICE CHANGE: 0
ABDOMINAL DISTENTION: 0
PHOTOPHOBIA: 0
COLOR CHANGE: 0
BACK PAIN: 0
EYE DISCHARGE: 0
BLOOD IN STOOL: 0
SHORTNESS OF BREATH: 0
TROUBLE SWALLOWING: 0
DIARRHEA: 0
SORE THROAT: 0
ABDOMINAL PAIN: 0

## 2022-09-14 ENCOUNTER — TRANSCRIBE ORDERS (OUTPATIENT)
Dept: ADMINISTRATIVE | Facility: HOSPITAL | Age: 67
End: 2022-09-14

## 2022-09-14 DIAGNOSIS — Z12.31 ENCOUNTER FOR SCREENING MAMMOGRAM FOR MALIGNANT NEOPLASM OF BREAST: Primary | ICD-10-CM

## 2022-11-07 ENCOUNTER — HOSPITAL ENCOUNTER (OUTPATIENT)
Dept: MAMMOGRAPHY | Facility: HOSPITAL | Age: 67
Discharge: HOME | End: 2022-11-07
Admitting: FAMILY MEDICINE

## 2022-11-07 DIAGNOSIS — Z12.31 ENCOUNTER FOR SCREENING MAMMOGRAM FOR MALIGNANT NEOPLASM OF BREAST: ICD-10-CM

## 2022-11-07 PROCEDURE — 77063 BREAST TOMOSYNTHESIS BI: CPT

## 2022-11-07 PROCEDURE — 77067 SCR MAMMO BI INCL CAD: CPT

## 2023-01-06 NOTE — PROGRESS NOTES
Progress Note      Pt Name: Jewel Camargo: 1955  MRN: [id number]    Date of evaluation: 1/10/2023  History Obtained From:  patient, spouse, electronic medical record    CHIEF COMPLAINT:    Chief Complaint   Patient presents with    Follow-up     Iron deficiency anemia due to chronic blood loss     Current active problems  Iron deficiency anemia      HISTORY OF PRESENT ILLNESS:    Bhumi Siegel is a 69 y.o.  female severe anemia with iron deficiency component seen initially in the office on 8/19/2021. Prior endoscopy revealed ulcerations in the distal esophagus. She responded well to IV iron replacement with Injectafer. She continues on Protonix daily. She denies any transfusions since her last visit. visit. She has been feeling well, denies any hospitalizations as well. She has a history of DVT, heterozygous factor V Leiden, pulmonary embolus and was previously on warfarin but that was discontinued with IVC filter placed when she had her acute GI blood loss. GI bleeding resolved and her IVC filter was removed by Dr. Pedrito Benitez. She has her INR performed monthly by Dr. Pina Ulrich however its been 2-month since her last visit there. She appears to have fully recovered from her left knee arthroplasty 6/21/2022. She only takes Tylenol as needed for pain. She does have hypothyroidism and is stable on her Synthroid 88 mcg daily. She continues taking Tums daily for hypocalcemia-however she forgets to take on occasion. She is also on potassium replacement for hypokalemia. Blood pressure is stable on her Maxide 37.5 daily. She takes Zocor 40 mg nightly. 1st HEMATOLOGY HISTORY: Iron deficiency anemia  Anthony Garcia was seen in initial hematology consultation on 8/19/2021 referred by IMELDA Rushing for persistent anemia. Anthony Garcia presented to [facility] earlier this year with a syncopal episode. She was also noted to have been experiencing melena x4 days.   Prior to the admission however she reports that she was feeling more dyspnea with exertion, fatigue for a couple of months. CBC 4/27/2021 revealed a WBC of 9 with 80% PMN, 14.5% lymphocyte, 4.7% monocyte, Hgb 9.2, MCV 91.5, ,000     Hgb dropped to 6.6 on 4/27/2021. Serology 4/27/2021  Serum Fe - 122  TIBC - 303  Fe sat - 40%  Ferritin - 33.5  Retic - 2.03% with absolute 0.0554  Haptoglobin - 250     CMP - crt 0.8 GFR > 60, total protein low, total bilirubin normal < 0.2     She had a history of heterozygous factor V Leiden, pulmonary embolus and was on warfarin. Dr. Augustina Ferrari performed an endoscopy on 4/28/2021 as inpatient at [facility] which revealed  Nonbleeding 8 mm distal esophageal ulcer secondary to GERD  1.2 cm antral ulcer, origin of GI bleed which had subsided  1 cm inflammatory antral polyp (hyperplastic gastric mucosa with reactive features)     She did receive transfusions of 3 unit packed red blood cell  --  2 separate occasions during her hospitalization. Dr. Sherryl Goodell placed an IVC filter on 4/28/2021. Endoscopy per Dr. Lisseth Snyder 6/25/2021 revealed  Abnormal lower third esophagus with 1 to 1.5 cm segments of tonguelike projections (intestinal metaplasia not seen)  Distal esophageal ring  3 to 4 cm in length hiatal hernia without erosions  Multiple, small, sessile 3 to 4 mm diameter gastric fundic and body polyps     Colonoscopy per Dr. Lisseth Snyder 6/25/2021 revealed  8 mm diameter sessile hepatic polyp (tubular adenoma)     Irish Galeana reported that she was taking Ferrex 150 daily for 3 months. She denied any GI upset with the iron replacement. She denied any melena or any obvious GI bleeding at present. She continued to have fatigue. Her initial CBC in the office on 8/19/2021 revealed a WBC of 4.41 with 69.2% PMN, 19.5% lymphocyte, 7.7% monocyte, 1.6% eosinophil, 2% basophil. Hgb 9.9, MCV 88.3, ,000.      I discussed the fact that her Hgb had continued to slowly improve up to 9.9. M2A capsule completed on 8/23/2021 was NEGATIVE     I discussed potential need of IV iron replacement if iron panel is still low. I also discussed the potential need for bone marrow aspiration and biopsy if iron panel is stable, there is no noted acute GI blood loss, and other serology is unrevealing. Serology 8/19/2021  Serum Fe - 39  TIBC - 270  Fe sat - 14%  Ferritin - 13.6    B12 - 408  Folate - 6.5    LDH -625 (313-618)    SPEP -no M spike with immunofixation negative  QI - IgG 894, IgA 201, IgM 56  Free serum light chain kappa 20.6 with K/L ratio 1.26    Her iron saturation above was low so I contacted her to see if she wanted to increase her iron to twice a day or take IV iron. She elected parenteral iron replacement. TREATMENT SUMMARY  Injectafer 750 mg IV on 8/31 and 9/8/2021      2nd HEMATOLOGY HISTORY: Pulmonary embolism 1990, heterozygous factor V Leiden  She reports that her diagnosis of PE was initially at [facility] in [address] [facility]. She was treated with warfarin. When she relocated to [place] she was evaluated by hematologist in [facility] around 2005 and was found to have a heterozygous factor V Leiden. She was told that she needed to be on lifelong anticoagulation. She was on Coumadin until she presented to the hospital with a GI bleed 4/27/2021. Dr. Doris Del Castillo did place a removable IVC filter during her acute hospitalization. Dr. Doris Del Castillo remove the IVC filter on 1/20/2022. Her warfarin is managed by her PCP Dr. Chilo Castillo.       Past Medical History:   Diagnosis Date    Arthritis     Chronic back pain     pain management    Depression     Factor 5 Leiden mutation, heterozygous ([address]     Gastric ulcer     GERD (gastroesophageal reflux disease)     GI bleed     History of blood transfusion     Hx of blood clots     Hyperlipidemia     Hypertension     mild    Knee pain     Nasal fracture     closed; no surgery or reduction needed    Pulmonary embolism ([address]     Thyroid disease     Warfarin anticoagulation         Past Surgical History:   Procedure Laterality Date    BLADDER SUSPENSION      2013, done with hysterectomy    COLONOSCOPY  approx 2017    [facility]:  \"polyps repeat in 1 yr\" per pt recall    COLONOSCOPY N/A 06/25/2021    Dr Woody Padilla, polyp, redundant tortuous colon, int hemorrhoids, 5 year recall    ENDOSCOPY, COLON, DIAGNOSTIC      HYSTERECTOMY (CERVIX STATUS UNKNOWN)  2013    HYSTERECTOMY, VAGINAL  2013    done while bladder surgery being done     [address]      2013 with hysterectomy    THYROIDECTOMY      2013    TOTAL KNEE ARTHROPLASTY Left 6/21/2022    LEFT KNEE TOTAL ARTHROPLASTY performed by Angelia Henry MD at [address] ENDOSCOPY N/A 04/28/2021    Dr Dos Santos-Nonbleeding 8 mm distal esophageal ulcer secondary to GERD, 1.2 cm antral ulcer, 1 cm inflammatory antral polyp-HP, gastritis    UPPER GASTROINTESTINAL ENDOSCOPY N/A 06/25/2021    Dr Radha Strauss- 3-4cm HH, esophagitis , gastric polyps; NEG h pylori, NEG barretts (however had the endoscopic appearance , so pt was given a 3 yr recall for re-evaluation)           Current Outpatient Medications:     magnesium oxide (MAG-OX) 400 MG tablet, Take 400 mg by mouth daily, Disp: , Rfl:     levothyroxine (SYNTHROID) 88 MCG tablet, Take 88 mcg by mouth Daily, Disp: , Rfl:     warfarin (COUMADIN) 4 MG tablet, Take 4 mg by mouth Daily with supper , Disp: , Rfl:     pantoprazole (PROTONIX) 40 MG tablet, Take 1 tablet by mouth every morning (before breakfast), Disp: 30 tablet, Rfl: 9    Cholecalciferol (VITAMIN D3) 50 MCG (2000 UT) CAPS, Take 1,000 Units by mouth 2 times daily, Disp: , Rfl:     potassium chloride (KLOR-CON M) 20 MEQ extended release tablet, Take 20 mEq by mouth 2 times daily , Disp: , Rfl:     simvastatin (ZOCOR) 40 MG tablet, Take 40 mg by mouth nightly , Disp: , Rfl:     calcium carbonate (TUMS) 500 MG chewable tablet, Take 3 tablets by mouth 2 times daily , Disp: , Rfl:     Triamterene-HCTZ (MAXZIDE PO), Take 37.5 mg by mouth daily Pt takes 37.5/25 mg daily, Disp: , Rfl:     FLUoxetine (PROZAC) 20 MG capsule, Take 20 mg by mouth daily, Disp: , Rfl:     pregabalin (LYRICA) 50 MG capsule, Take 50 mg by mouth 2 times daily. , Disp: , Rfl:      Allergies   Allergen Reactions    Bacitracin-Polymyxin B Hives, Itching and Rash    Macrobid [Nitrofurantoin] Itching and Rash    Neomycin-Bacitracin-Polymyxin [Bacitracin-Neomycin-Polymyxin] Hives, Itching and Rash    Sulfa Antibiotics Hives, Itching and Rash       Social History     Tobacco Use    Smoking status: Never    Smokeless tobacco: Never   Vaping Use    Vaping Use: Never used   Substance Use Topics    Alcohol use: Not Currently    Drug use: Never       Family History   Problem Relation Age of Onset    Breast Cancer Mother          at age 50 years    Heart Attack Father         when pt 1 months old  of a fatal MI - was at age 64 years    No Known Problems Son          in MVA at age 24 years    No Known Problems Son     No Known Problems Son     No Known Problems Son     Colon Cancer Neg Hx     Colon Polyps Neg Hx     Esophageal Cancer Neg Hx     Liver Cancer Neg Hx     Rectal Cancer Neg Hx     Stomach Cancer Neg Hx        Subjective   REVIEW OF SYSTEMS:   Review of Systems   Constitutional:  Negative for chills, diaphoresis, fatigue, fever and unexpected weight change. HENT:  Negative for mouth sores, nosebleeds, sore throat, trouble swallowing and voice change. Eyes:  Negative for photophobia, discharge and itching. Respiratory:  Negative for cough, shortness of breath and wheezing. Cardiovascular:  Negative for chest pain, palpitations and leg swelling. Gastrointestinal:  Negative for abdominal distention, abdominal pain, blood in stool, constipation, diarrhea, nausea and vomiting.    Endocrine: Negative for cold intolerance, heat intolerance, polydipsia and polyuria. Genitourinary:  Negative for difficulty urinating, dysuria, hematuria and urgency. Musculoskeletal:  Positive for arthralgias (Left knee doing well post arthroplasty). Negative for back pain, joint swelling and myalgias. Skin:  Negative for color change and rash. Neurological:  Negative for dizziness, tremors, seizures, syncope and light-headedness. Hematological:  Negative for adenopathy. Does not bruise/bleed easily. Psychiatric/Behavioral:  Negative for behavioral problems and suicidal ideas. The patient is not nervous/anxious. All other systems reviewed and are negative. Objective   /78   Pulse 85   Wt 202 lb 4.8 oz (91.8 kg)   SpO2 97%   BMI 38.22 kg/m²     PHYSICAL EXAM:  Physical Exam  Constitutional:       Appearance: She is well-developed. HENT:      Head: Normocephalic and atraumatic. Eyes:      General: No scleral icterus. Conjunctiva/sclera: Conjunctivae normal.   Neck:      Trachea: No tracheal deviation. Cardiovascular:      Rate and Rhythm: Normal rate and regular rhythm. Heart sounds: Normal heart sounds. No murmur heard. Pulmonary:      Effort: Pulmonary effort is normal. No respiratory distress. Breath sounds: Normal breath sounds. Abdominal:      General: Bowel sounds are normal. There is no distension. Palpations: Abdomen is soft. Tenderness: There is no abdominal tenderness. Musculoskeletal:         General: No tenderness or deformity. Cervical back: Normal range of motion and neck supple. Lymphadenopathy:      Cervical: No cervical adenopathy. Upper Body:      Right upper body: No supraclavicular or axillary adenopathy. Left upper body: No supraclavicular or axillary adenopathy. Lower Body: No right inguinal adenopathy. No left inguinal adenopathy. Skin:     General: Skin is warm and dry. Findings: No rash.    Neurological:      Mental Status: She is alert and oriented to person, place, and time. Coordination: Coordination normal.   Psychiatric:         Behavior: Behavior normal.         Thought Content: Thought content normal.     CBC reviewed by me  Lab Results   Component Value Date    WBC 4.17 01/10/2023    HGB 12.1 01/10/2023    HCT 37.7 01/10/2023    MCV 91.7 01/10/2023     01/10/2023     Lab Results   Component Value Date    NEUTROABS 3.13 01/10/2023     VISIT DIAGNOSES  1. Iron deficiency anemia due to chronic blood loss    2. Intestinal malabsorption, unspecified type    3. Leukopenia, unspecified type        ASSESSMENT/PLAN:    1. Iron deficiency anemia -distal esophagitis/ulcerations. Normalized with IV iron replacement    Prior Injectafer on 8/31/2021 and 9/8/2021. Her hemoglobin is stable at 12.1 with MCV 91.7.      2.  Mild basophilia/leukopenia -     She follow-up in the office on 10/1/2021 with WBC noted to have decreased slightly to 3.85 with 2.3% basophil. Peripheral blood smear [facility] 10/1/2021  Normochromic normocytic anemia with mild anisocytosis, occasional elliptocytes, and rufus cells seen. Mild leukopenia, overall the neutrophils are hypersegmented. No circulating blasts  Lymphocytes have heterogenous morphologic features  Platelets appear slightly decreased in number with no platelet clumps seen        WBC is normal at 4.17. Differential is stable. CBC will continue to be monitored. 3.  History of DVT/PE/heterozygous factor V Leiden    Status post IVC filter removal 1/20/2022 by Dr. Dom Potts. I told her to get back into see Dr. Kenroy Crowell for her INR. HEALTH MAINTENANCE    Breast cancer screening. Bilateral screening mammogram [facility] on 11/7/2022 was BIRADS 2       Colon cancer screening. Colonoscopy per Dr. Contreras Baldwin 6/25/2021 revealed 8 mm diameter sessile hepatic polyp (tubular adenoma)     Cervical/GYN cancer screening.   Prior hysterectomy      Immunization History   Administered Date(s) Administered COVID-19, Moderna, Booster, PF, 50mcg/0.25ml 11/19/2021    COVID-19, Moderna, Primary or Immunocompromised, PF, 100mcg/0.5mL 12/30/2020, 01/26/2021          Return in about 9 months (around 10/10/2023) for With [facility].      Jane Tobar PA-C  8:59 AM  1/10/2023

## 2023-01-10 ENCOUNTER — HOSPITAL ENCOUNTER (OUTPATIENT)
Dept: INFUSION THERAPY | Age: 68
Discharge: HOME | End: 2023-01-10
Payer: MEDICARE

## 2023-01-10 ENCOUNTER — OFFICE VISIT (OUTPATIENT)
Dept: HEMATOLOGY | Age: 68
End: 2023-01-10
Payer: MEDICARE

## 2023-01-10 VITALS
DIASTOLIC BLOOD PRESSURE: 78 MMHG | WEIGHT: 202.3 LBS | OXYGEN SATURATION: 97 % | HEART RATE: 85 BPM | SYSTOLIC BLOOD PRESSURE: 118 MMHG | BODY MASS INDEX: 38.22 KG/M2

## 2023-01-10 DIAGNOSIS — K90.9 INTESTINAL MALABSORPTION, UNSPECIFIED TYPE: ICD-10-CM

## 2023-01-10 DIAGNOSIS — D72.819 LEUKOPENIA, UNSPECIFIED TYPE: ICD-10-CM

## 2023-01-10 DIAGNOSIS — D69.9 ANTICOAGULANT DISORDER (HCC): ICD-10-CM

## 2023-01-10 DIAGNOSIS — D50.0 IRON DEFICIENCY ANEMIA DUE TO CHRONIC BLOOD LOSS: Primary | ICD-10-CM

## 2023-01-10 DIAGNOSIS — D68.51 HETEROZYGOUS FACTOR V LEIDEN MUTATION (HCC): ICD-10-CM

## 2023-01-10 DIAGNOSIS — D69.9 ANTICOAGULANT DISORDER (HCC): Primary | ICD-10-CM

## 2023-01-10 LAB
BASOPHILS ABSOLUTE: 0.03 K/UL (ref 0.01–0.08)
BASOPHILS RELATIVE PERCENT: 0.7 % (ref 0.1–1.2)
EOSINOPHILS ABSOLUTE: 0.06 K/UL (ref 0.04–0.54)
EOSINOPHILS RELATIVE PERCENT: 1.4 % (ref 0.7–7)
HCT VFR BLD CALC: 37.7 % (ref 34.1–44.9)
HEMOGLOBIN: 12.1 G/DL (ref 11.2–15.7)
LYMPHOCYTES ABSOLUTE: 0.71 K/UL (ref 1.18–3.74)
LYMPHOCYTES RELATIVE PERCENT: 17 % (ref 19.3–53.1)
MCH RBC QN AUTO: 29.4 PG (ref 25.6–32.2)
MCHC RBC AUTO-ENTMCNC: 32.1 G/DL (ref 32.3–35.5)
MCV RBC AUTO: 91.7 FL (ref 79.4–94.8)
MONOCYTES ABSOLUTE: 0.23 K/UL (ref 0.24–0.82)
MONOCYTES RELATIVE PERCENT: 5.5 % (ref 4.7–12.5)
NEUTROPHILS ABSOLUTE: 3.13 K/UL (ref 1.56–6.13)
NEUTROPHILS RELATIVE PERCENT: 75.2 % (ref 34–71.1)
PDW BLD-RTO: 13.4 % (ref 11.7–14.4)
PLATELET # BLD: 203 K/UL (ref 182–369)
PMV BLD AUTO: 9.8 FL (ref 7.4–10.4)
RBC # BLD: 4.11 M/UL (ref 3.93–5.22)
WBC # BLD: 4.17 K/UL (ref 3.98–10.04)

## 2023-01-10 PROCEDURE — 1036F TOBACCO NON-USER: CPT | Performed by: PHYSICIAN ASSISTANT

## 2023-01-10 PROCEDURE — 3078F DIAST BP <80 MM HG: CPT | Performed by: PHYSICIAN ASSISTANT

## 2023-01-10 PROCEDURE — 3017F COLORECTAL CA SCREEN DOC REV: CPT | Performed by: PHYSICIAN ASSISTANT

## 2023-01-10 PROCEDURE — 36415 COLL VENOUS BLD VENIPUNCTURE: CPT

## 2023-01-10 PROCEDURE — 85025 COMPLETE CBC W/AUTO DIFF WBC: CPT

## 2023-01-10 PROCEDURE — G8400 PT W/DXA NO RESULTS DOC: HCPCS | Performed by: PHYSICIAN ASSISTANT

## 2023-01-10 PROCEDURE — 1090F PRES/ABSN URINE INCON ASSESS: CPT | Performed by: PHYSICIAN ASSISTANT

## 2023-01-10 PROCEDURE — G8417 CALC BMI ABV UP PARAM F/U: HCPCS | Performed by: PHYSICIAN ASSISTANT

## 2023-01-10 PROCEDURE — 99211 OFF/OP EST MAY X REQ PHY/QHP: CPT

## 2023-01-10 PROCEDURE — 1123F ACP DISCUSS/DSCN MKR DOCD: CPT | Performed by: PHYSICIAN ASSISTANT

## 2023-01-10 PROCEDURE — G8427 DOCREV CUR MEDS BY ELIG CLIN: HCPCS | Performed by: PHYSICIAN ASSISTANT

## 2023-01-10 PROCEDURE — 3074F SYST BP LT 130 MM HG: CPT | Performed by: PHYSICIAN ASSISTANT

## 2023-01-10 PROCEDURE — 99213 OFFICE O/P EST LOW 20 MIN: CPT | Performed by: PHYSICIAN ASSISTANT

## 2023-01-10 PROCEDURE — G8484 FLU IMMUNIZE NO ADMIN: HCPCS | Performed by: PHYSICIAN ASSISTANT

## 2023-01-10 ASSESSMENT — ENCOUNTER SYMPTOMS
COLOR CHANGE: 0
PHOTOPHOBIA: 0
EYE ITCHING: 0
EYE DISCHARGE: 0
CONSTIPATION: 0
VOICE CHANGE: 0
COUGH: 0
SORE THROAT: 0
WHEEZING: 0
TROUBLE SWALLOWING: 0
VOMITING: 0
SHORTNESS OF BREATH: 0
DIARRHEA: 0
ABDOMINAL PAIN: 0
BLOOD IN STOOL: 0
NAUSEA: 0
ABDOMINAL DISTENTION: 0
BACK PAIN: 0

## 2023-08-30 ENCOUNTER — TRANSCRIBE ORDERS (OUTPATIENT)
Dept: ADMINISTRATIVE | Facility: HOSPITAL | Age: 68
End: 2023-08-30
Payer: MEDICARE

## 2023-08-30 DIAGNOSIS — Z12.31 ENCOUNTER FOR SCREENING MAMMOGRAM FOR MALIGNANT NEOPLASM OF BREAST: Primary | ICD-10-CM

## 2023-10-04 ENCOUNTER — TELEPHONE (OUTPATIENT)
Dept: HEMATOLOGY | Age: 68
End: 2023-10-04

## 2023-10-04 NOTE — TELEPHONE ENCOUNTER
I called patient to remind them of their appointment on 10/06/2023 and had to leave a vm. Detailed vm was left for their upcoming appt and made them aware to call office if unable to keep this appointment.

## 2023-10-05 DIAGNOSIS — D50.0 IRON DEFICIENCY ANEMIA DUE TO CHRONIC BLOOD LOSS: Primary | ICD-10-CM

## 2023-10-05 NOTE — PROGRESS NOTES
Progress Note      Pt Name: Joyce Eisenberg: 1955  MRN: [id number]    Date of evaluation: 10/6/2023  History Obtained From:  patient, spouse, electronic medical record    CHIEF COMPLAINT:    Chief Complaint   Patient presents with    Follow-up     Iron deficiency anemia due to chronic blood loss     Current active problems  Iron deficiency anemia      HISTORY OF PRESENT ILLNESS:    Sabi Greene is a 77 y.o.  female severe anemia with iron deficiency component seen initially in the office on 8/19/2021. Prior endoscopy revealed ulcerations in the distal esophagus. She responded well to IV iron replacement with Injectafer. She continues on pantoprazole 40 mg p.o. daily. She denies any transfusions since her last visit. She has a history of DVT, heterozygous factor V Leiden, pulmonary embolus and was previously on warfarin but that was discontinued with IVC filter placed when she had her acute GI blood loss. GI bleeding resolved and her IVC filter was removed by Dr. Francisco Alicia. She has her INR performed monthly by Dr. Viry Alonso and reports that it has been stable. Most recent issue was visual disturbances-blurred vision that occurred about a week ago. It impaired her driving and persisted throughout the evening-slightly improved through the night. She was taken to [facility] emergency room-reports that imaging of the brain was negative. He also has been experiencing recent memory loss over the past 2 to 3 months. PCP is considering potentially neurology referral.    She denies any lateralization of weakness, her hands did draw slightly bilaterally. She had balance issues no speech issues the episodes above.  ed for pain. She does have hypothyroidism and is stable on her Synthroid 88 mcg daily. She continues taking Tums daily for hypocalcemia. She is also on potassium replacement for hypokalemia. Blood pressure is stable on her Maxide 37.5 daily.   She denied any hypertensive

## 2023-10-06 ENCOUNTER — OFFICE VISIT (OUTPATIENT)
Dept: HEMATOLOGY | Age: 68
End: 2023-10-06
Payer: MEDICARE

## 2023-10-06 ENCOUNTER — HOSPITAL ENCOUNTER (OUTPATIENT)
Dept: INFUSION THERAPY | Age: 68
Discharge: HOME | End: 2023-10-06
Payer: MEDICARE

## 2023-10-06 VITALS
BODY MASS INDEX: 38.76 KG/M2 | HEART RATE: 75 BPM | TEMPERATURE: 98.2 F | DIASTOLIC BLOOD PRESSURE: 68 MMHG | WEIGHT: 210.6 LBS | SYSTOLIC BLOOD PRESSURE: 130 MMHG | OXYGEN SATURATION: 96 % | HEIGHT: 62 IN

## 2023-10-06 DIAGNOSIS — D50.0 IRON DEFICIENCY ANEMIA DUE TO CHRONIC BLOOD LOSS: Primary | ICD-10-CM

## 2023-10-06 DIAGNOSIS — D50.0 IRON DEFICIENCY ANEMIA DUE TO CHRONIC BLOOD LOSS: ICD-10-CM

## 2023-10-06 DIAGNOSIS — R41.3 MEMORY LOSS: ICD-10-CM

## 2023-10-06 DIAGNOSIS — H53.9 VISION DISTURBANCE: ICD-10-CM

## 2023-10-06 LAB
BASOPHILS # BLD: 0.04 K/UL (ref 0.01–0.08)
BASOPHILS NFR BLD: 0.8 % (ref 0.1–1.2)
EOSINOPHIL # BLD: 0.08 K/UL (ref 0.04–0.54)
EOSINOPHIL NFR BLD: 1.5 % (ref 0.7–7)
ERYTHROCYTE [DISTWIDTH] IN BLOOD BY AUTOMATED COUNT: 13.3 % (ref 11.7–14.4)
HCT VFR BLD AUTO: 36.3 % (ref 34.1–44.9)
HGB BLD-MCNC: 12.1 G/DL (ref 11.2–15.7)
LYMPHOCYTES # BLD: 0.94 K/UL (ref 1.18–3.74)
LYMPHOCYTES NFR BLD: 18.1 % (ref 19.3–53.1)
MCH RBC QN AUTO: 29.3 PG (ref 25.6–32.2)
MCHC RBC AUTO-ENTMCNC: 33.3 G/DL (ref 32.3–35.5)
MCV RBC AUTO: 87.9 FL (ref 79.4–94.8)
MONOCYTES # BLD: 0.31 K/UL (ref 0.24–0.82)
MONOCYTES NFR BLD: 6 % (ref 4.7–12.5)
NEUTROPHILS # BLD: 3.79 K/UL (ref 1.56–6.13)
NEUTS SEG NFR BLD: 73.2 % (ref 34–71.1)
PLATELET # BLD AUTO: 195 K/UL (ref 182–369)
PMV BLD AUTO: 9.6 FL (ref 7.4–10.4)
RBC # BLD AUTO: 4.13 M/UL (ref 3.93–5.22)
WBC # BLD AUTO: 5.18 K/UL (ref 3.98–10.04)

## 2023-10-06 PROCEDURE — 1123F ACP DISCUSS/DSCN MKR DOCD: CPT | Performed by: PHYSICIAN ASSISTANT

## 2023-10-06 PROCEDURE — 85025 COMPLETE CBC W/AUTO DIFF WBC: CPT

## 2023-10-06 PROCEDURE — 3017F COLORECTAL CA SCREEN DOC REV: CPT | Performed by: PHYSICIAN ASSISTANT

## 2023-10-06 PROCEDURE — G8417 CALC BMI ABV UP PARAM F/U: HCPCS | Performed by: PHYSICIAN ASSISTANT

## 2023-10-06 PROCEDURE — 3078F DIAST BP <80 MM HG: CPT | Performed by: PHYSICIAN ASSISTANT

## 2023-10-06 PROCEDURE — G8427 DOCREV CUR MEDS BY ELIG CLIN: HCPCS | Performed by: PHYSICIAN ASSISTANT

## 2023-10-06 PROCEDURE — 3075F SYST BP GE 130 - 139MM HG: CPT | Performed by: PHYSICIAN ASSISTANT

## 2023-10-06 PROCEDURE — 1090F PRES/ABSN URINE INCON ASSESS: CPT | Performed by: PHYSICIAN ASSISTANT

## 2023-10-06 PROCEDURE — 1036F TOBACCO NON-USER: CPT | Performed by: PHYSICIAN ASSISTANT

## 2023-10-06 PROCEDURE — 99212 OFFICE O/P EST SF 10 MIN: CPT

## 2023-10-06 PROCEDURE — 36415 COLL VENOUS BLD VENIPUNCTURE: CPT

## 2023-10-06 PROCEDURE — G8400 PT W/DXA NO RESULTS DOC: HCPCS | Performed by: PHYSICIAN ASSISTANT

## 2023-10-06 PROCEDURE — 99213 OFFICE O/P EST LOW 20 MIN: CPT | Performed by: PHYSICIAN ASSISTANT

## 2023-10-06 PROCEDURE — G8484 FLU IMMUNIZE NO ADMIN: HCPCS | Performed by: PHYSICIAN ASSISTANT

## 2023-10-06 ASSESSMENT — ENCOUNTER SYMPTOMS
SORE THROAT: 0
CONSTIPATION: 0
VOMITING: 0
ABDOMINAL DISTENTION: 0
TROUBLE SWALLOWING: 0
EYE ITCHING: 0
NAUSEA: 0
EYE DISCHARGE: 0
ABDOMINAL PAIN: 0
BLOOD IN STOOL: 0
DIARRHEA: 0
VOICE CHANGE: 0
BACK PAIN: 0
WHEEZING: 0
SHORTNESS OF BREATH: 0
PHOTOPHOBIA: 0
COLOR CHANGE: 0
COUGH: 0

## 2023-11-08 ENCOUNTER — HOSPITAL ENCOUNTER (OUTPATIENT)
Dept: MAMMOGRAPHY | Facility: HOSPITAL | Age: 68
Discharge: HOME | End: 2023-11-08
Admitting: FAMILY MEDICINE
Payer: MEDICARE

## 2023-11-08 DIAGNOSIS — Z12.31 ENCOUNTER FOR SCREENING MAMMOGRAM FOR MALIGNANT NEOPLASM OF BREAST: ICD-10-CM

## 2023-11-08 PROCEDURE — 77067 SCR MAMMO BI INCL CAD: CPT

## 2023-11-08 PROCEDURE — 77063 BREAST TOMOSYNTHESIS BI: CPT

## 2023-12-06 ENCOUNTER — TELEPHONE (OUTPATIENT)
Dept: NEUROLOGY | Age: 68
End: 2023-12-06

## 2023-12-06 NOTE — TELEPHONE ENCOUNTER
Spoke with pt and she was recently treated at [facility] ED for the visual disturbance, CT head was negative. Pt also voiced she had an MRI which we do not ave so I asked pt to pbtain CD with CT and MRI images and bring the reports to her upcoming appt. I also asked pt if she has had a recent eye exam. Pt voiced she had one about two weeks ago, I asked pt to call and bring that last office note to her upcoming appt as well. Pt voiced understanding.

## 2023-12-14 ENCOUNTER — OFFICE VISIT (OUTPATIENT)
Dept: NEUROLOGY | Age: 68
End: 2023-12-14
Payer: MEDICARE

## 2023-12-14 VITALS
HEART RATE: 78 BPM | HEIGHT: 62 IN | OXYGEN SATURATION: 99 % | DIASTOLIC BLOOD PRESSURE: 72 MMHG | WEIGHT: 210 LBS | BODY MASS INDEX: 38.64 KG/M2 | SYSTOLIC BLOOD PRESSURE: 118 MMHG

## 2023-12-14 DIAGNOSIS — R41.3 MEMORY LOSS: ICD-10-CM

## 2023-12-14 DIAGNOSIS — Z86.59 HISTORY OF ANXIETY: ICD-10-CM

## 2023-12-14 DIAGNOSIS — Z79.01 ANTICOAGULATED: ICD-10-CM

## 2023-12-14 DIAGNOSIS — R41.3 MEMORY LOSS: Primary | ICD-10-CM

## 2023-12-14 LAB — VIT B12 SERPL-MCNC: 472 PG/ML (ref 211–946)

## 2023-12-14 PROCEDURE — 99204 OFFICE O/P NEW MOD 45 MIN: CPT | Performed by: PSYCHIATRY & NEUROLOGY

## 2023-12-14 PROCEDURE — G8417 CALC BMI ABV UP PARAM F/U: HCPCS | Performed by: PSYCHIATRY & NEUROLOGY

## 2023-12-14 PROCEDURE — 3078F DIAST BP <80 MM HG: CPT | Performed by: PSYCHIATRY & NEUROLOGY

## 2023-12-14 PROCEDURE — 3017F COLORECTAL CA SCREEN DOC REV: CPT | Performed by: PSYCHIATRY & NEUROLOGY

## 2023-12-14 PROCEDURE — 1036F TOBACCO NON-USER: CPT | Performed by: PSYCHIATRY & NEUROLOGY

## 2023-12-14 PROCEDURE — G8427 DOCREV CUR MEDS BY ELIG CLIN: HCPCS | Performed by: PSYCHIATRY & NEUROLOGY

## 2023-12-14 PROCEDURE — 1090F PRES/ABSN URINE INCON ASSESS: CPT | Performed by: PSYCHIATRY & NEUROLOGY

## 2023-12-14 PROCEDURE — 3074F SYST BP LT 130 MM HG: CPT | Performed by: PSYCHIATRY & NEUROLOGY

## 2023-12-14 PROCEDURE — G8400 PT W/DXA NO RESULTS DOC: HCPCS | Performed by: PSYCHIATRY & NEUROLOGY

## 2023-12-14 PROCEDURE — G8484 FLU IMMUNIZE NO ADMIN: HCPCS | Performed by: PSYCHIATRY & NEUROLOGY

## 2023-12-14 PROCEDURE — 1123F ACP DISCUSS/DSCN MKR DOCD: CPT | Performed by: PSYCHIATRY & NEUROLOGY

## 2023-12-14 RX ORDER — FLUOXETINE HYDROCHLORIDE 40 MG/1
40 CAPSULE ORAL DAILY
COMMUNITY
Start: 2023-10-13

## 2023-12-14 RX ORDER — MECLIZINE HYDROCHLORIDE 25 MG/1
25 TABLET ORAL 3 TIMES DAILY PRN
COMMUNITY
Start: 2023-09-26 | End: 2023-12-14 | Stop reason: ALTCHOICE

## 2023-12-14 NOTE — PROGRESS NOTES
REVIEW OF SYSTEMS    Constitutional: []Fever []Sweats []Chills [] Recent Injury   [x] Denies all unless marked  HENT:[]Headache  [] Head Injury  [] Sore Throat  [] Ear Pain  [] Dizziness [] Hearing Loss   [x] Denies all unless marked  Spine:  [] Neck pain  [] Back pain  [] Sciatica  [x] Denies all unless marked  Cardiovascular:[]Chest Pain []Palpitations [] Heart Disease  [x] Denies all unless marked  Pulmonary: []Shortness of Breath []Cough   [x] Denies all unless marked  Gastrointestinal:  []Abdominal Pain  []Blood in Stool  []Diarrhea []Constipation []Nausea  []Vomiting  [x] Denies all unless marked  Genitourinary:  [] Dysuria [] Frequency  [] Incontinence [] Urgency   [x] Denies all unless marked  Musculoskeletal: [] Arthralgia  [] Myalgias [] Muscle cramps  [] Muscle twitches   [x] Denies all unless marked   Extremities:   [] Pain   [] Swelling   [x] Denies all unless marked  Skin:[] Rash  [] Color Change  [x] Denies all unless marked  Neurological:[x] Visual Disturbance [] Double Vision [] Slurred Speech [] Trouble swallowing  [] Vertigo [] Tingling [] Numbness [] Weakness [] Loss of Balance   [] Loss of Consciousness [x] Memory Loss [] Seizures  [] Denies all unless marked  Psychiatric/Behavioral:[] Depression [x] Anxiety  [] Denies all unless marked  Sleep: []  Insomnia [] Sleep Disturbance [] Snoring [] Restless Legs [] Daytime Sleepiness [] Sleep Apnea  [x] Denies all unless marked
Short-term memory 0/4 at 5 minutes. Normal clock drawing. Follows complex commands without difficulty. She knew the name of her oldest grandchild. Speech normal without dysarthria  No clear issues with language of fund of knowledge    Cranial Nerve Exam   CN II- Visual fields grossly unremarkable. VA adequate. Discs sharp  CN III, IV,VI- PERRLA, EOMI, No nystagmus, conjugate eye movements, no ptosis  CN V-sensation intact to LT over face  CN VII-no facial asymmetry  CN VIII-Hearing intact   CN IX and X- Palate elevates in midline  CN XI-good shoulder shrug  CN XII-Tongue midline with no fasciculations or fibrillations    Motor Exam  V/V throughout upper and lower extremities bilaterally, no cogwheeling, normal tone    Sensory Exam  Sensation intact to light touch , PP  upper and lower extremities bilaterally; normal vibration sense in LE's    Reflexes   2+ biceps bilaterally  2+ brachioradialis  2+ triceps  2+patella  2+ ankle jerks  No clonus ankles  No Ac's sign bilateral hands. No Babinski sign. Tremors- no tremors in hands or head noted    Gait  Normal base and speed  No ataxia. No Romberg sign    Coordination  Finger to nose and HUMBERTO-unremarkable    Lab Results   Component Value Date    YOPNEGCW75 408 08/19/2021     Lab Results   Component Value Date    WBC 5.18 10/06/2023    HGB 12.1 10/06/2023    HCT 36.3 10/06/2023    MCV 87.9 10/06/2023     10/06/2023     Lab Results   Component Value Date     06/10/2022    K 4.4 06/10/2022     06/10/2022    CO2 29 06/10/2022    BUN 15 06/10/2022    CREATININE 0.8 06/10/2022    GLUCOSE 86 06/10/2022    CALCIUM 7.5 (L) 06/10/2022    PROT 7.1 08/19/2021    LABALBU 3.7 04/27/2021    BILITOT <0.2 04/27/2021    ALKPHOS 50 04/27/2021    AST 17 04/27/2021    ALT 7 04/27/2021    LABGLOM >60 06/10/2022    GFRAA >59 06/10/2022    AGRATIO 1.1 08/19/2021    GLOB 3.4 08/19/2021           Assessment    ICD-10-CM    1.  Memory loss  R41.3 Vitamin B12

## 2024-01-09 ENCOUNTER — TELEPHONE (OUTPATIENT)
Dept: PSYCHIATRY | Age: 69
End: 2024-01-09

## 2024-01-09 NOTE — TELEPHONE ENCOUNTER
Called and sarojm reminding pt of her appt for 1/10 @ 10 AM    Electronically signed by Wally Can on 1/9/2024 at 11:29 AM\

## 2024-01-10 ENCOUNTER — OFFICE VISIT (OUTPATIENT)
Dept: PSYCHIATRY | Age: 69
End: 2024-01-10
Payer: MEDICARE

## 2024-01-10 VITALS
HEART RATE: 72 BPM | BODY MASS INDEX: 37.17 KG/M2 | SYSTOLIC BLOOD PRESSURE: 141 MMHG | HEIGHT: 62 IN | DIASTOLIC BLOOD PRESSURE: 79 MMHG | TEMPERATURE: 98 F | OXYGEN SATURATION: 97 % | WEIGHT: 202 LBS

## 2024-01-10 DIAGNOSIS — F41.0 GENERALIZED ANXIETY DISORDER WITH PANIC ATTACKS: ICD-10-CM

## 2024-01-10 DIAGNOSIS — F41.1 GENERALIZED ANXIETY DISORDER WITH PANIC ATTACKS: ICD-10-CM

## 2024-01-10 DIAGNOSIS — G47.10 HYPERSOMNIA: ICD-10-CM

## 2024-01-10 DIAGNOSIS — G47.00 INSOMNIA, UNSPECIFIED TYPE: ICD-10-CM

## 2024-01-10 DIAGNOSIS — F33.1 MAJOR DEPRESSIVE DISORDER, RECURRENT, MODERATE (HCC): Primary | ICD-10-CM

## 2024-01-10 PROCEDURE — 90792 PSYCH DIAG EVAL W/MED SRVCS: CPT | Performed by: PSYCHIATRY & NEUROLOGY

## 2024-01-10 PROCEDURE — 1036F TOBACCO NON-USER: CPT | Performed by: PSYCHIATRY & NEUROLOGY

## 2024-01-10 RX ORDER — FLUOXETINE HYDROCHLORIDE 20 MG/1
20 CAPSULE ORAL DAILY
Qty: 30 CAPSULE | Refills: 1 | Status: SHIPPED | OUTPATIENT
Start: 2024-01-10 | End: 2024-02-09

## 2024-01-10 ASSESSMENT — PATIENT HEALTH QUESTIONNAIRE - PHQ9
SUM OF ALL RESPONSES TO PHQ QUESTIONS 1-9: 9
9. THOUGHTS THAT YOU WOULD BE BETTER OFF DEAD, OR OF HURTING YOURSELF: 0
SUM OF ALL RESPONSES TO PHQ QUESTIONS 1-9: 9
3. TROUBLE FALLING OR STAYING ASLEEP: 1
SUM OF ALL RESPONSES TO PHQ QUESTIONS 1-9: 9
1. LITTLE INTEREST OR PLEASURE IN DOING THINGS: 2
6. FEELING BAD ABOUT YOURSELF - OR THAT YOU ARE A FAILURE OR HAVE LET YOURSELF OR YOUR FAMILY DOWN: 1
SUM OF ALL RESPONSES TO PHQ QUESTIONS 1-9: 9
8. MOVING OR SPEAKING SO SLOWLY THAT OTHER PEOPLE COULD HAVE NOTICED. OR THE OPPOSITE, BEING SO FIGETY OR RESTLESS THAT YOU HAVE BEEN MOVING AROUND A LOT MORE THAN USUAL: 1
4. FEELING TIRED OR HAVING LITTLE ENERGY: 2
2. FEELING DOWN, DEPRESSED OR HOPELESS: 1
7. TROUBLE CONCENTRATING ON THINGS, SUCH AS READING THE NEWSPAPER OR WATCHING TELEVISION: 1
10. IF YOU CHECKED OFF ANY PROBLEMS, HOW DIFFICULT HAVE THESE PROBLEMS MADE IT FOR YOU TO DO YOUR WORK, TAKE CARE OF THINGS AT HOME, OR GET ALONG WITH OTHER PEOPLE: 1
SUM OF ALL RESPONSES TO PHQ9 QUESTIONS 1 & 2: 3
5. POOR APPETITE OR OVEREATING: 0

## 2024-01-10 NOTE — PROGRESS NOTES
PSYCHIATRIC EVALUATION    Date of Service:  1/10/2024    Chief Complaint   Patient presents with    Medication Check       HISTORY OF PRESENT ILLNESS  68-year-old white female with history of depression, presents to hospitals care, referred by her primary care provider. Previous records and labs reviewed.  On Prozac 40 mg. Has Atarax 25 PRN - has not been taking.     Pt comes with her . She has been depressed. Her oldest son  in a car accident 23 yrs ago. That was when depression started. Her other son is in [facility].  She has been having a lot of anxiety.  She has been forgetful.  Low energy level. She retired last year. Still works as a caretaker 2x / week. She cannot drive at night - light sensitivity after cataract surgery. Drives during the day without an issue. Sleeping poorly. Snoring. Hypersomnia during the day. Never had a sleep study. She has never been in therapy.  She enjoys reading and gardening.  has no safety concerns.      PSYCHIATRIC HISTORY  Diagnoses: Depression  Suicide attempts/gestures: Denies   Prior hospitalizations: Denies   Medication trials: Denies   Mental health contact: Lost to follow-up   Head trauma: Denies     SUBSTANCE USE HISTORY  Denies alcohol and illicit drug use.  Denies tobacco use.    FAMILY PSYCHIATRIC HISTORY  No history of psychiatric illness or suicide attempts.    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    negative history of head trauma.  See past medical history below.      Information obtained via patient and chart review    PCP is  Juan Carlos Goyal    Allergies: Bacitracin-polymyxin b, Macrobid [nitrofurantoin],

## 2024-01-15 ENCOUNTER — TELEPHONE (OUTPATIENT)
Dept: PSYCHIATRY | Age: 69
End: 2024-01-15

## 2024-01-15 NOTE — TELEPHONE ENCOUNTER
Sent Referral to [facility] for therapy     Electronically signed by April Dowling MA on 1/15/2024 at 2:51 PM

## 2024-01-18 ENCOUNTER — TELEPHONE (OUTPATIENT)
Dept: PSYCHIATRY | Age: 69
End: 2024-01-18

## 2024-01-18 NOTE — TELEPHONE ENCOUNTER
Attempted to contact pt as requested by Dr Eisenberg to see how she is doing since her first appt last week-VM left with the above info and request for call back.

## 2024-02-06 ENCOUNTER — TELEPHONE (OUTPATIENT)
Dept: PSYCHIATRY | Age: 69
End: 2024-02-06

## 2024-02-06 NOTE — TELEPHONE ENCOUNTER
Called and confirmed appt with pt for 2/7 @ 11 AM    Electronically signed by Wally Can on 2/6/2024 at 2:16 PM

## 2024-02-07 ENCOUNTER — OFFICE VISIT (OUTPATIENT)
Dept: PSYCHIATRY | Age: 69
End: 2024-02-07
Payer: MEDICARE

## 2024-02-07 VITALS
DIASTOLIC BLOOD PRESSURE: 80 MMHG | HEART RATE: 79 BPM | HEIGHT: 63 IN | TEMPERATURE: 97.7 F | WEIGHT: 193.7 LBS | BODY MASS INDEX: 34.32 KG/M2 | OXYGEN SATURATION: 98 % | SYSTOLIC BLOOD PRESSURE: 120 MMHG

## 2024-02-07 DIAGNOSIS — F41.1 GENERALIZED ANXIETY DISORDER WITH PANIC ATTACKS: ICD-10-CM

## 2024-02-07 DIAGNOSIS — F33.1 MAJOR DEPRESSIVE DISORDER, RECURRENT, MODERATE (HCC): Primary | ICD-10-CM

## 2024-02-07 DIAGNOSIS — G47.00 INSOMNIA, UNSPECIFIED TYPE: ICD-10-CM

## 2024-02-07 DIAGNOSIS — G47.33 SLEEP APNEA, OBSTRUCTIVE: Primary | ICD-10-CM

## 2024-02-07 DIAGNOSIS — F41.0 GENERALIZED ANXIETY DISORDER WITH PANIC ATTACKS: ICD-10-CM

## 2024-02-07 PROCEDURE — G8484 FLU IMMUNIZE NO ADMIN: HCPCS | Performed by: PSYCHIATRY & NEUROLOGY

## 2024-02-07 PROCEDURE — 3074F SYST BP LT 130 MM HG: CPT | Performed by: PSYCHIATRY & NEUROLOGY

## 2024-02-07 PROCEDURE — 3017F COLORECTAL CA SCREEN DOC REV: CPT | Performed by: PSYCHIATRY & NEUROLOGY

## 2024-02-07 PROCEDURE — 1123F ACP DISCUSS/DSCN MKR DOCD: CPT | Performed by: PSYCHIATRY & NEUROLOGY

## 2024-02-07 PROCEDURE — G8428 CUR MEDS NOT DOCUMENT: HCPCS | Performed by: PSYCHIATRY & NEUROLOGY

## 2024-02-07 PROCEDURE — 1036F TOBACCO NON-USER: CPT | Performed by: PSYCHIATRY & NEUROLOGY

## 2024-02-07 PROCEDURE — 99214 OFFICE O/P EST MOD 30 MIN: CPT | Performed by: PSYCHIATRY & NEUROLOGY

## 2024-02-07 PROCEDURE — 1090F PRES/ABSN URINE INCON ASSESS: CPT | Performed by: PSYCHIATRY & NEUROLOGY

## 2024-02-07 PROCEDURE — G8417 CALC BMI ABV UP PARAM F/U: HCPCS | Performed by: PSYCHIATRY & NEUROLOGY

## 2024-02-07 PROCEDURE — 3079F DIAST BP 80-89 MM HG: CPT | Performed by: PSYCHIATRY & NEUROLOGY

## 2024-02-07 PROCEDURE — G8400 PT W/DXA NO RESULTS DOC: HCPCS | Performed by: PSYCHIATRY & NEUROLOGY

## 2024-02-07 ASSESSMENT — PATIENT HEALTH QUESTIONNAIRE - PHQ9
2. FEELING DOWN, DEPRESSED OR HOPELESS: 0
10. IF YOU CHECKED OFF ANY PROBLEMS, HOW DIFFICULT HAVE THESE PROBLEMS MADE IT FOR YOU TO DO YOUR WORK, TAKE CARE OF THINGS AT HOME, OR GET ALONG WITH OTHER PEOPLE: 1
1. LITTLE INTEREST OR PLEASURE IN DOING THINGS: 1
SUM OF ALL RESPONSES TO PHQ QUESTIONS 1-9: 3
SUM OF ALL RESPONSES TO PHQ QUESTIONS 1-9: 3
3. TROUBLE FALLING OR STAYING ASLEEP: 0
7. TROUBLE CONCENTRATING ON THINGS, SUCH AS READING THE NEWSPAPER OR WATCHING TELEVISION: 0
5. POOR APPETITE OR OVEREATING: 1
SUM OF ALL RESPONSES TO PHQ QUESTIONS 1-9: 3
4. FEELING TIRED OR HAVING LITTLE ENERGY: 1
SUM OF ALL RESPONSES TO PHQ QUESTIONS 1-9: 3
9. THOUGHTS THAT YOU WOULD BE BETTER OFF DEAD, OR OF HURTING YOURSELF: 0
SUM OF ALL RESPONSES TO PHQ9 QUESTIONS 1 & 2: 1
6. FEELING BAD ABOUT YOURSELF - OR THAT YOU ARE A FAILURE OR HAVE LET YOURSELF OR YOUR FAMILY DOWN: 0
8. MOVING OR SPEAKING SO SLOWLY THAT OTHER PEOPLE COULD HAVE NOTICED. OR THE OPPOSITE, BEING SO FIGETY OR RESTLESS THAT YOU HAVE BEEN MOVING AROUND A LOT MORE THAN USUAL: 0

## 2024-02-07 NOTE — PROGRESS NOTES
2/7/2024 11:34 AM   Progress Note          Julieta Michelle 1955      Chief Complaint   Patient presents with    Medication Check         Subjective:    68-year-old white female with history of depression, presents for follow up. Stopped Lyrica and increased Prozac last visit.  Initially had some GI side effects on Prozac.  On Atarax 25 PRN. Sleep study done, results pending    Patient  presents with brighter affect.  Smiling.  Significant improvement in mood and energy levels since stopped Lyrica.  Thinking more clearly.  Helping  with the household.  Sleeping poorly still.  Had 1 therapy appointment.   has no safety concerns.  Feels that patient is doing significantly better.    BP: /80   Pulse 79   Temp 97.7 °F (36.5 °C)   Ht 1.6 m (5' 3\")   Wt 87.9 kg (193 lb 11.2 oz)   SpO2 98%   BMI 34.31 kg/m²       Review of Systems - 14 point review:  Negative except for    Constitutional: (fevers, chills, night sweats, wt loss/gain, change in appetite, fatigue, somnolence)    HEENT: (ear pain or discharge, hearing loss, ear ringing, sinus pressure, nosebleed, nasal discharge, sore throat, oral sores, tooth pain, bleeding gums, hoarse voice, neck pain)      Cardiovascular: (HTN, chest pain, elevated cholesterol/lipids, palpitations, leg swelling, leg pain with walking)    Respiratory: (cough, wheezing, snoring, SOB with activity (dyspnea), SOB while lying flat (orthopnea), awakening with severe SOB (paroxysmal nocturnal dyspnea))    Gastrointestinal: (NVD, constipation, abdominal pain, bright red stools, black tarry stools, stool incontinence)     Genitourinary:  (pelvic pain, burning or frequency of urination, urinary urgency, blood in urine incomplete bladder emptying, urinary incontinence, STD; MEN: testicular pain or swelling, erectile dysfunction; WOMEN: LMP, heavy menstrual bleeding (menorrhagia), irregular periods, postmenopausal bleeding, menstrual pain (dymenorrhea, vaginal

## 2024-02-07 NOTE — PROGRESS NOTES
[facility]  [address] [place], [place] [address]  Phone [phone] Fax [phone]     Patient Name: Julieta Michelle 2024  : 1955  Age: 68 y.o.  Patient Address: [address] [place] [address] [address] [place] [id number]       Patient Phone: [phone] (home) [phone] (work)    REFERRAL  Referred to: DME provider of patient's choice  Julieta Michelle is referred for the following:    DME Equipment HPCPS Code Setting   Auto Adjusting CPAP device with flex or comparable pressure relief per comfort  6cm to 16cm   Heated Humidifier  Patient Choice       Replinishible PAP Supplies, 1 year supply  Item HPCPS Code Frequency   Mask of choice  or  1 per 3 months   Nasal Mask cushion/pillows  or  2 per 30 days   Full Face Mask Interface  1 per 30 days   Headgear  1 per 6 months   Tubing, length of choice  or  1 per 3 months   Water Chamber  1 per 6 months   Chinstrap  1 per 6 months   Disposable Filters  2 per 30 days   Reusable Filters  1 per 6 months     Diagnoses:  Obstructive sleep apnea (G47.33)  Length of Need: Lifetime, 99    Ordering Provider: Miki Lyons M.D.  NPI: [id number]         Signature:       Date: 2024   Electronically Signed by Miki Lyons M.D.

## 2024-02-13 ENCOUNTER — TELEPHONE (OUTPATIENT)
Dept: SLEEP CENTER | Age: 69
End: 2024-02-13

## 2024-02-13 NOTE — TELEPHONE ENCOUNTER
Spoke to Ms.Julieta Miguel Angel and went over the results of her HST performed 01/25/2024 and 01/27/2024.  Questions answered.  Orders, documentation and insurance information were sent to [facility].

## 2024-02-18 ENCOUNTER — TELEPHONE (OUTPATIENT)
Dept: PSYCHIATRY | Age: 69
End: 2024-02-18

## 2024-02-29 NOTE — TELEPHONE ENCOUNTER
(NVD, constipation, abdominal pain, bright red stools, black tarry stools, stool incontinence)     Genitourinary:  (pelvic pain, burning or frequency of urination, urinary urgency, blood in urine incomplete bladder emptying, urinary incontinence, STD; MEN: testicular pain or swelling, erectile dysfunction; WOMEN: LMP, heavy menstrual bleeding (menorrhagia), irregular periods, postmenopausal bleeding, menstrual pain (dymenorrhea, vaginal discharge)     Musculoskeletal: (bone pain/fracture, joint pain or swelling, musle pain)     Integumentary: (rashes, acne, non-healing sores, itching, breast lumps, breast pain, nipple discharge, hair loss)     Neurologic: (HA, muscle weakness, paresthesias (numbness, coldness, crawling or prickling), memory loss, seizure, dizziness)     Endocrine: (heat or cold intolerance, excessive thirst (polydipsia), excessive hunger (polyphagia))     Immune/Allergic: (hives, seasonal or environmental allergies, HIV exposure)     Hematologic/Lymphatic: (lymph node enlargement, easy bleeding or bruising)     History obtained via chart review and patient     PCP is Juan Carlos Goyal         Current Meds:     Home Medications           Prior to Admission medications    Medication Sig Start Date End Date Taking? Authorizing Provider   FLUoxetine (PROZAC) 20 MG capsule Take 1 capsule by mouth daily 1/10/24 2/9/24   Meri Eisenberg MD   FLUoxetine (PROZAC) 40 MG capsule Take 1 capsule by mouth daily 10/13/23     Joya White MD   magnesium oxide (MAG-OX) 400 MG tablet Take 1 tablet by mouth daily       Joya White MD   levothyroxine (SYNTHROID) 88 MCG tablet Take 1 tablet by mouth Daily       Joya White MD   warfarin (COUMADIN) 4 MG tablet Take 1 tablet by mouth Daily with supper       Joya White MD   pantoprazole (PROTONIX) 40 MG tablet Take 1 tablet by mouth every morning (before breakfast) 9/29/21     Tracy Pink APRN   Cholecalciferol (VITAMIN D3) 50

## 2024-03-01 RX ORDER — FLUOXETINE HYDROCHLORIDE 20 MG/1
20 CAPSULE ORAL DAILY
Qty: 90 CAPSULE | Refills: 3 | Status: SHIPPED | OUTPATIENT
Start: 2024-03-01 | End: 2024-05-30

## 2024-03-15 ENCOUNTER — TELEPHONE (OUTPATIENT)
Dept: PSYCHIATRY | Age: 69
End: 2024-03-15

## 2024-03-15 NOTE — TELEPHONE ENCOUNTER
Called and sarojm reminding pt of her appt for 3/18 @ 8:30 AM    Electronically signed by Wally Can on 3/15/2024 at 10:58 AM

## 2024-03-18 ENCOUNTER — OFFICE VISIT (OUTPATIENT)
Dept: PSYCHIATRY | Age: 69
End: 2024-03-18
Payer: MEDICARE

## 2024-03-18 VITALS
SYSTOLIC BLOOD PRESSURE: 122 MMHG | HEIGHT: 62 IN | HEART RATE: 74 BPM | DIASTOLIC BLOOD PRESSURE: 75 MMHG | WEIGHT: 187.4 LBS | OXYGEN SATURATION: 97 % | TEMPERATURE: 97.8 F | BODY MASS INDEX: 34.48 KG/M2

## 2024-03-18 DIAGNOSIS — F33.0 MILD EPISODE OF RECURRENT MAJOR DEPRESSIVE DISORDER (HCC): Primary | ICD-10-CM

## 2024-03-18 DIAGNOSIS — F41.0 GENERALIZED ANXIETY DISORDER WITH PANIC ATTACKS: ICD-10-CM

## 2024-03-18 DIAGNOSIS — G47.00 INSOMNIA, UNSPECIFIED TYPE: ICD-10-CM

## 2024-03-18 DIAGNOSIS — F41.1 GENERALIZED ANXIETY DISORDER WITH PANIC ATTACKS: ICD-10-CM

## 2024-03-18 PROCEDURE — 3078F DIAST BP <80 MM HG: CPT | Performed by: PSYCHIATRY & NEUROLOGY

## 2024-03-18 PROCEDURE — 99214 OFFICE O/P EST MOD 30 MIN: CPT | Performed by: PSYCHIATRY & NEUROLOGY

## 2024-03-18 PROCEDURE — 1123F ACP DISCUSS/DSCN MKR DOCD: CPT | Performed by: PSYCHIATRY & NEUROLOGY

## 2024-03-18 PROCEDURE — G8417 CALC BMI ABV UP PARAM F/U: HCPCS | Performed by: PSYCHIATRY & NEUROLOGY

## 2024-03-18 PROCEDURE — 3074F SYST BP LT 130 MM HG: CPT | Performed by: PSYCHIATRY & NEUROLOGY

## 2024-03-18 PROCEDURE — G8428 CUR MEDS NOT DOCUMENT: HCPCS | Performed by: PSYCHIATRY & NEUROLOGY

## 2024-03-18 PROCEDURE — 1036F TOBACCO NON-USER: CPT | Performed by: PSYCHIATRY & NEUROLOGY

## 2024-03-18 PROCEDURE — 1090F PRES/ABSN URINE INCON ASSESS: CPT | Performed by: PSYCHIATRY & NEUROLOGY

## 2024-03-18 PROCEDURE — G8484 FLU IMMUNIZE NO ADMIN: HCPCS | Performed by: PSYCHIATRY & NEUROLOGY

## 2024-03-18 PROCEDURE — G8400 PT W/DXA NO RESULTS DOC: HCPCS | Performed by: PSYCHIATRY & NEUROLOGY

## 2024-03-18 PROCEDURE — 3017F COLORECTAL CA SCREEN DOC REV: CPT | Performed by: PSYCHIATRY & NEUROLOGY

## 2024-03-18 ASSESSMENT — PATIENT HEALTH QUESTIONNAIRE - PHQ9
4. FEELING TIRED OR HAVING LITTLE ENERGY: NOT AT ALL
10. IF YOU CHECKED OFF ANY PROBLEMS, HOW DIFFICULT HAVE THESE PROBLEMS MADE IT FOR YOU TO DO YOUR WORK, TAKE CARE OF THINGS AT HOME, OR GET ALONG WITH OTHER PEOPLE: SOMEWHAT DIFFICULT
3. TROUBLE FALLING OR STAYING ASLEEP: NOT AT ALL
1. LITTLE INTEREST OR PLEASURE IN DOING THINGS: SEVERAL DAYS
9. THOUGHTS THAT YOU WOULD BE BETTER OFF DEAD, OR OF HURTING YOURSELF: NOT AT ALL
SUM OF ALL RESPONSES TO PHQ QUESTIONS 1-9: 2
SUM OF ALL RESPONSES TO PHQ QUESTIONS 1-9: 2
2. FEELING DOWN, DEPRESSED OR HOPELESS: NOT AT ALL
SUM OF ALL RESPONSES TO PHQ QUESTIONS 1-9: 2
8. MOVING OR SPEAKING SO SLOWLY THAT OTHER PEOPLE COULD HAVE NOTICED. OR THE OPPOSITE, BEING SO FIGETY OR RESTLESS THAT YOU HAVE BEEN MOVING AROUND A LOT MORE THAN USUAL: NOT AT ALL
SUM OF ALL RESPONSES TO PHQ QUESTIONS 1-9: 2
6. FEELING BAD ABOUT YOURSELF - OR THAT YOU ARE A FAILURE OR HAVE LET YOURSELF OR YOUR FAMILY DOWN: NOT AT ALL
7. TROUBLE CONCENTRATING ON THINGS, SUCH AS READING THE NEWSPAPER OR WATCHING TELEVISION: NOT AT ALL
SUM OF ALL RESPONSES TO PHQ9 QUESTIONS 1 & 2: 1
5. POOR APPETITE OR OVEREATING: SEVERAL DAYS

## 2024-03-18 NOTE — PROGRESS NOTES
3/18/2024 8:51 AM   Progress Note          Julieta Michelle 1955      Chief Complaint   Patient presents with    Medication Check         Subjective:    68-year-old white female with history of depression, presents for follow up. On Prozac - some GI SE.  On Atarax 25 PRN. Sleep study showed DANIELA and RLS - started on CPAP.  Lost some weight.    Patient comes with her .  Brighter affect today.  Smiling.  Further improvement in mood, sleep and energy levels.  No confusion.  Concentration improved.  More physically active.  Working in the yard.  Has been seeing the therapist.   has no concerns.  Pleased with the progress made so far.      BP: /75   Pulse 74   Temp 97.8 °F (36.6 °C)   Ht 1.575 m (5' 2\")   Wt 85 kg (187 lb 6.4 oz)   SpO2 97%   BMI 34.28 kg/m²       Review of Systems - 14 point review:  Negative except for    Constitutional: (fevers, chills, night sweats, wt loss/gain, change in appetite, fatigue, somnolence)    HEENT: (ear pain or discharge, hearing loss, ear ringing, sinus pressure, nosebleed, nasal discharge, sore throat, oral sores, tooth pain, bleeding gums, hoarse voice, neck pain)      Cardiovascular: (HTN, chest pain, elevated cholesterol/lipids, palpitations, leg swelling, leg pain with walking)    Respiratory: (cough, wheezing, snoring, SOB with activity (dyspnea), SOB while lying flat (orthopnea), awakening with severe SOB (paroxysmal nocturnal dyspnea))    Gastrointestinal: (NVD, constipation, abdominal pain, bright red stools, black tarry stools, stool incontinence)     Genitourinary:  (pelvic pain, burning or frequency of urination, urinary urgency, blood in urine incomplete bladder emptying, urinary incontinence, STD; MEN: testicular pain or swelling, erectile dysfunction; WOMEN: LMP, heavy menstrual bleeding (menorrhagia), irregular periods, postmenopausal bleeding, menstrual pain (dymenorrhea, vaginal discharge)    Musculoskeletal: (bone pain/fracture, joint

## 2024-04-08 ENCOUNTER — TELEPHONE (OUTPATIENT)
Dept: HEMATOLOGY | Age: 69
End: 2024-04-08

## 2024-04-08 NOTE — TELEPHONE ENCOUNTER
Left detailed vm about appt info for 04/11/24 and i also left a call back number in case patient needed to reschedule.

## 2024-04-09 NOTE — PROGRESS NOTES
Progress Note      Pt Name: Julieta Michelle  YOB: 1955  MRN: [id number]    Date of evaluation: 4/11/2024  History Obtained From:  patient, spouse, electronic medical record    CHIEF COMPLAINT:    Chief Complaint   Patient presents with    Follow-up     Iron deficiency anemia due to chronic blood loss     Current active problems  Iron deficiency anemia      HISTORY OF PRESENT ILLNESS:    Julieta Michelle is a 68 y.o.  female severe anemia with iron deficiency component seen initially in the office on 8/19/2021.  Prior endoscopy revealed ulcerations in the distal esophagus.  She had normalization of her iron levels, hemoglobin with IV iron replacement.  She has had no recurrence of any anemia since then.  She has denies any transfusion.  She continues taking pantoprazole 40 daily.      She has a history of DVT, heterozygous factor V Leiden, pulmonary embolus on warfarin with INR performed monthly by Dr. Goyal.    She recently had issues with memory blurred vision and was evaluated by Dr. Batista at [facility] neurology.  She was found to have sleep apnea and is now using a CPAP, fatigue is better.  She was also evaluated by psychiatry at [facility] and was taken off of her Lyrica.  Memory is much better off of that medication.    She does have hypothyroidism and is stable on her Synthroid 88 mcg daily.   She continues taking Tums daily for hypocalcemia. She is also on potassium replacement for hypokalemia.  Blood pressure is stable on her Maxide 37.5 daily.  She takes simvastatin 40 mg nightly.      1st HEMATOLOGY HISTORY: Iron deficiency anemia  Julieta was seen in initial hematology consultation on 8/19/2021 referred by IMELDA Valiente for persistent anemia.     Julieta presented to [facility] earlier this year with a syncopal episode.  She was also noted to have been experiencing melena x4 days.  Prior to the admission however she reports that she was feeling more dyspnea with exertion, fatigue for a

## 2024-04-11 ENCOUNTER — HOSPITAL ENCOUNTER (OUTPATIENT)
Dept: INFUSION THERAPY | Age: 69
Discharge: HOME | End: 2024-04-11
Payer: MEDICARE

## 2024-04-11 ENCOUNTER — OFFICE VISIT (OUTPATIENT)
Dept: HEMATOLOGY | Age: 69
End: 2024-04-11
Payer: MEDICARE

## 2024-04-11 VITALS
BODY MASS INDEX: 35.15 KG/M2 | HEART RATE: 61 BPM | TEMPERATURE: 98.3 F | WEIGHT: 191 LBS | OXYGEN SATURATION: 98 % | HEIGHT: 62 IN | DIASTOLIC BLOOD PRESSURE: 74 MMHG | SYSTOLIC BLOOD PRESSURE: 128 MMHG

## 2024-04-11 DIAGNOSIS — D50.0 IRON DEFICIENCY ANEMIA DUE TO CHRONIC BLOOD LOSS: ICD-10-CM

## 2024-04-11 DIAGNOSIS — D50.0 IRON DEFICIENCY ANEMIA DUE TO CHRONIC BLOOD LOSS: Primary | ICD-10-CM

## 2024-04-11 DIAGNOSIS — R41.3 MEMORY LOSS: ICD-10-CM

## 2024-04-11 DIAGNOSIS — H53.9 VISION DISTURBANCE: ICD-10-CM

## 2024-04-11 LAB
BASOPHILS # BLD: 0.01 K/UL (ref 0.01–0.08)
BASOPHILS NFR BLD: 0.2 % (ref 0.1–1.2)
EOSINOPHIL # BLD: 0.05 K/UL (ref 0.04–0.54)
EOSINOPHIL NFR BLD: 1.1 % (ref 0.7–7)
ERYTHROCYTE [DISTWIDTH] IN BLOOD BY AUTOMATED COUNT: 13.3 % (ref 11.7–14.4)
HCT VFR BLD AUTO: 35.7 % (ref 34.1–44.9)
HGB BLD-MCNC: 11.5 G/DL (ref 11.2–15.7)
LYMPHOCYTES # BLD: 0.67 K/UL (ref 1.18–3.74)
LYMPHOCYTES NFR BLD: 14.2 % (ref 19.3–53.1)
MCH RBC QN AUTO: 29 PG (ref 25.6–32.2)
MCHC RBC AUTO-ENTMCNC: 32.2 G/DL (ref 32.3–35.5)
MCV RBC AUTO: 89.9 FL (ref 79.4–94.8)
MONOCYTES # BLD: 0.24 K/UL (ref 0.24–0.82)
MONOCYTES NFR BLD: 5.1 % (ref 4.7–12.5)
NEUTROPHILS # BLD: 3.75 K/UL (ref 1.56–6.13)
NEUTS SEG NFR BLD: 79.2 % (ref 34–71.1)
PLATELET # BLD AUTO: 197 K/UL (ref 182–369)
PMV BLD AUTO: 8.5 FL (ref 7.4–10.4)
RBC # BLD AUTO: 3.97 M/UL (ref 3.93–5.22)
WBC # BLD AUTO: 4.73 K/UL (ref 3.98–10.04)

## 2024-04-11 PROCEDURE — G8427 DOCREV CUR MEDS BY ELIG CLIN: HCPCS | Performed by: PHYSICIAN ASSISTANT

## 2024-04-11 PROCEDURE — 1036F TOBACCO NON-USER: CPT | Performed by: PHYSICIAN ASSISTANT

## 2024-04-11 PROCEDURE — 99213 OFFICE O/P EST LOW 20 MIN: CPT | Performed by: PHYSICIAN ASSISTANT

## 2024-04-11 PROCEDURE — 3017F COLORECTAL CA SCREEN DOC REV: CPT | Performed by: PHYSICIAN ASSISTANT

## 2024-04-11 PROCEDURE — 36415 COLL VENOUS BLD VENIPUNCTURE: CPT

## 2024-04-11 PROCEDURE — 3078F DIAST BP <80 MM HG: CPT | Performed by: PHYSICIAN ASSISTANT

## 2024-04-11 PROCEDURE — 1090F PRES/ABSN URINE INCON ASSESS: CPT | Performed by: PHYSICIAN ASSISTANT

## 2024-04-11 PROCEDURE — 3074F SYST BP LT 130 MM HG: CPT | Performed by: PHYSICIAN ASSISTANT

## 2024-04-11 PROCEDURE — G8417 CALC BMI ABV UP PARAM F/U: HCPCS | Performed by: PHYSICIAN ASSISTANT

## 2024-04-11 PROCEDURE — 85025 COMPLETE CBC W/AUTO DIFF WBC: CPT

## 2024-04-11 PROCEDURE — 99212 OFFICE O/P EST SF 10 MIN: CPT

## 2024-04-11 PROCEDURE — 1123F ACP DISCUSS/DSCN MKR DOCD: CPT | Performed by: PHYSICIAN ASSISTANT

## 2024-04-11 PROCEDURE — G8400 PT W/DXA NO RESULTS DOC: HCPCS | Performed by: PHYSICIAN ASSISTANT

## 2024-04-11 ASSESSMENT — ENCOUNTER SYMPTOMS
TROUBLE SWALLOWING: 0
COUGH: 0
ABDOMINAL DISTENTION: 0
WHEEZING: 0
CONSTIPATION: 0
VOICE CHANGE: 0
NAUSEA: 0
PHOTOPHOBIA: 0
EYE ITCHING: 0
COLOR CHANGE: 0
SHORTNESS OF BREATH: 0
DIARRHEA: 0
EYE DISCHARGE: 0
VOMITING: 0
ABDOMINAL PAIN: 0
BACK PAIN: 0
SORE THROAT: 0

## 2024-04-29 ENCOUNTER — TELEPHONE (OUTPATIENT)
Dept: NEUROLOGY | Age: 69
End: 2024-04-29

## 2024-04-29 NOTE — TELEPHONE ENCOUNTER
Called and left patient a VM to let her know that I have her scheduled an appointment after being setup on her DME sleep machine. Left on VM the appointment time/date/location/phone number.

## 2024-05-15 NOTE — ED PROVIDER NOTES
[facility]  Psychology  Progress Note  Individual Psychotherapy (PhD/LCSW)    Patient Name: Anthony Sexton  MRN: [id number]    Patient Class: OP- [facility] Outpatient [facility]  Primary Care Provider: Tyesha Leggett MD    Psychiatry Initial Visit (PhD/LCSW)  Individual Psychotherapy - CPT 34303  EAP #5 for 2024    Date: 5/15/2024    Site: [place]    The patient location is: Patient's home/ Patient reported that his/her location at the time of this visit was in the [facility]     Visit type: Virtual visit with synchronous audio and video     Each patient to whom he or she provides medical services by telemedicine is: (1) informed of the relationship between the physician and patient and the respective role of any other health care provider with respect to management of the patient; and (2) notified that he or she may decline to receive medical services by telemedicine and may withdraw from such care at any time.     Referral source: Lenora Walters MD    Clinical status of patient: Outpatient    Anthony Sexton, a 33 y.o. female, for initial evaluation visit.  Met with patient.    Chief complaint/reason for encounter: depression, anxiety, sleep and appetite    History of present illness: Anxiety for past 11 years (since nursing school)     Pain: noncontributory    Symptoms:   Mood: depressed mood, diminished interest, weight gain, fatigue, poor concentration, altered libido, tearfulness and social isolation  Anxiety: decreased memory, excessive anxiety/worry and muscle tension  Substance abuse: denied  Cognitive functioning: denied  Health behaviors: noncontributory    Psychiatric history: currently under psychiatric care    Medical history: History of postpartum depression from 2016; Diagnosed with fibromyalgia in 2022.     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     Substance  reviewed and are negative. PAST MEDICALHISTORY     Past Medical History:   Diagnosis Date    Depression     [facility] Leiden [facility]     Pulmonary embolism ([facility]     Thyroid disease     Warfarin anticoagulation          SURGICAL HISTORY       Past Surgical History:   Procedure Laterality Date    BLADDER SURGERY      BLADDER SUSPENSION      , done with hysterectomy    HYSTERECTOMY, VAGINAL      done while bladder surgery being done   [address] [place] [address]       with hysterectomy    THYROIDECTOMY               CURRENT MEDICATIONS     Current Discharge Medication List      CONTINUE these medications which have NOT CHANGED    Details   Triamterene-HCTZ (MAXZIDE PO) Take 37.5 mg by mouth Pt takes 37.5/25 mg daily      FLUoxetine (PROZAC) 20 MG capsule Take 20 mg by mouth daily      Cholecalciferol (VITAMIN D3) 585957 UNIT/GM POWD by Does not apply route      !! warfarin (COUMADIN) 1 MG tablet Take 1 mg by mouth      pregabalin (LYRICA) 50 MG capsule Take 50 mg by mouth 3 times daily. !! warfarin (COUMADIN) 3 MG tablet Take by mouth daily      HYDROcodone-acetaminophen (NORCO) 7.5-325 MG per tablet Take 1 tablet by mouth every 6 hours as needed for Pain. !! - Potential duplicate medications found. Please discuss with provider.           ALLERGIES     Bacitracin-polymyxin b, Neomycin-bacitracin-polymyxin [bacitracin-neomycin-polymyxin], and Sulfa antibiotics    FAMILY HISTORY       Family History   Problem Relation Age of Onset   Zac Jefferson Breast Cancer Mother          at age 50 years   Zac Jefferson Heart Attack Father         when pt 1 months old  of a fatal MI - was at age 64 years    No Known Problems Son          in MVA at age 24 years    No Known Problems Son     No Known Problems Son     No Known Problems Son           SOCIAL HISTORY       Social History     Socioeconomic History    Marital status:      Spouse name: Mr. Stewart Nataleeyoseph Number of use:   Alcohol: none   Drugs: none   Tobacco: none   Caffeine: none    Current medications and drug reactions (include OTC, herbal): see medication list     Strengths and liabilities: Strength: Patient accepts guidance/feedback, Strength: Patient is expressive/articulate., Strength: Patient is intelligent., Strength: Patient is motivated for change., Strength: Patient has positive support network., Strength: Patient has reasonable judgment., Strength: Patient is stable., Liability: Patient lacks coping skills.    Current Evaluation:     Mental Status Exam:  General Appearance:  unremarkable, age appropriate   Speech: normal tone, normal rate, normal pitch, normal volume      Level of Cooperation: cooperative      Thought Processes: normal and logical   Mood: anxious, sad      Thought Content: normal, no suicidality, no homicidality, delusions, or paranoia   Affect: congruent and appropriate   Orientation: Oriented x3   Memory: recent >  intact   Attention Span & Concentration: intact   Fund of General Knowledge: intact and appropriate to age and level of education   Abstract Reasoning: not assessed   Judgment & Insight: fair     Language  intact     Summary: The patient had an interesting encounter with an elder that is making her question many aspects of her life. The patient received psychoeducation about data gathering and thought records to help her to analyze the thoughts/feelings that are coming up for her. She will work on this before our next session.   Diagnostic Impression - Plan:       ICD-10-CM ICD-9-CM   1. Undiagnosed disease or syndrome present  R69 799.9                                                             Plan:individual psychotherapy    Return to Clinic: as scheduled    Length of Service (minutes): 45          Barbra Bartlett, PhD  Psychologist  [facility]                                   children: 4    Years of education: None    Highest education level: None   Occupational History    Occupation: worked as a Nurses Aide   Social Needs    Financial resource strain: None    Food insecurity     Worry: None     Inability: None    Transportation needs     Medical: None     Non-medical: None   Tobacco Use    Smoking status: Never Smoker    Smokeless tobacco: Never Used   Substance and Sexual Activity    Alcohol use: Not Currently    Drug use: Never    Sexual activity: None     Comment: has 4 sons   Lifestyle    Physical activity     Days per week: None     Minutes per session: None    Stress: None   Relationships    Social connections     Talks on phone: None     Gets together: None     Attends [employer] service: None     Active member of club or organization: None     Attends meetings of clubs or organizations: None     Relationship status: None    Intimate partner violence     Fear of current or ex partner: None     Emotionally abused: None     Physically abused: None     Forced sexual activity: None   Other Topics Concern    None   Social History Narrative    CODE STATUS: Full Code    HEALTH CARE PROXY: Mr. Ronaldo Leung, her , [phone]    AMBULATES: independently    DOMICILED: lives in a private home, has no stairs inside, has 6 steps to enter front door       SCREENINGS    [place] Coma Scale  Eye Opening: Spontaneous  Best Verbal Response: Oriented  Best Motor Response: Obeys commands  [place] Coma Scale Score: 15        PHYSICAL EXAM    (up to 7 for level 4, 8 or more for level 5)     ED Triage Vitals [04/27/21 0100]   BP Temp Temp Source Pulse Resp SpO2 Height Weight   133/82 98.5 °F (36.9 °C) Oral 91 18 98 % 5' 1\" (1.549 m) 170 lb (77.1 kg)       Physical Exam  Vitals signs and nursing note reviewed. Exam conducted with a chaperone present. Constitutional:       General: She is not in acute distress. Appearance: Normal appearance. She is well-developed.  She is ill-appearing. She is not diaphoretic. HENT:      Head: Normocephalic. Comments: 1cm superficial laceration with mild bleeding     Right Ear: External ear normal.      Left Ear: External ear normal.   Eyes:      Conjunctiva/sclera: Conjunctivae normal.   Neck:      Musculoskeletal: Normal range of motion. Trachea: No tracheal deviation. Cardiovascular:      Rate and Rhythm: Normal rate and regular rhythm. Heart sounds: Normal heart sounds. No murmur. Pulmonary:      Effort: No respiratory distress. Breath sounds: Normal breath sounds. No wheezing or rales. Abdominal:      Palpations: Abdomen is soft. There is no mass. Tenderness: There is no abdominal tenderness. There is no right CVA tenderness or left CVA tenderness. Genitourinary:     Rectum: Guaiac result positive. Comments: Black stool  Musculoskeletal: Normal range of motion. Comments: Full range of motion of all 4 extremities without pain no midline neck or back pain   Skin:     General: Skin is warm and dry. Neurological:      Mental Status: She is alert and oriented to person, place, and time. GCS: GCS eye subscore is 4. GCS verbal subscore is 5. GCS motor subscore is 6. Cranial Nerves: No dysarthria or facial asymmetry. Sensory: Sensation is intact. Motor: No weakness or abnormal muscle tone. Coordination: Coordination is intact.          DIAGNOSTIC RESULTS     EKG: All EKG's areinterpreted by the Emergency Department Physician who either signs or Co-signs this chart in the absence of a cardiologist.    89 normal sinus rhythm, no prolonged QTC no obvious ST changes nondiagnostic EKG    RADIOLOGY:  Non-plain film images such as CT, Ultrasound and MRI are read by the radiologist. Plain radiographic images are visualized and preliminarily interpreted bythe emergency physician with the below findings:        XR CHEST PORTABLE    (Results Pending)   CT ABDOMEN PELVIS W IV CONTRAST

## 2024-06-17 ENCOUNTER — TELEPHONE (OUTPATIENT)
Dept: PSYCHIATRY | Age: 69
End: 2024-06-17

## 2024-06-17 NOTE — TELEPHONE ENCOUNTER
Called and confirmed appt with pt for 6/18 @ 8:30 AM    Electronically signed by Wally Can on 6/17/2024 at 12:00 PM

## 2024-06-18 ENCOUNTER — OFFICE VISIT (OUTPATIENT)
Dept: PSYCHIATRY | Age: 69
End: 2024-06-18
Payer: MEDICARE

## 2024-06-18 VITALS
HEART RATE: 71 BPM | DIASTOLIC BLOOD PRESSURE: 69 MMHG | SYSTOLIC BLOOD PRESSURE: 127 MMHG | TEMPERATURE: 98 F | WEIGHT: 190.7 LBS | OXYGEN SATURATION: 100 % | BODY MASS INDEX: 35.09 KG/M2 | HEIGHT: 62 IN

## 2024-06-18 DIAGNOSIS — F41.1 GENERALIZED ANXIETY DISORDER WITH PANIC ATTACKS: ICD-10-CM

## 2024-06-18 DIAGNOSIS — F41.0 GENERALIZED ANXIETY DISORDER WITH PANIC ATTACKS: ICD-10-CM

## 2024-06-18 DIAGNOSIS — F33.40 MAJOR DEPRESSIVE DISORDER, RECURRENT, IN REMISSION (HCC): Primary | ICD-10-CM

## 2024-06-18 DIAGNOSIS — G47.00 INSOMNIA, UNSPECIFIED TYPE: ICD-10-CM

## 2024-06-18 PROCEDURE — G8428 CUR MEDS NOT DOCUMENT: HCPCS | Performed by: PSYCHIATRY & NEUROLOGY

## 2024-06-18 PROCEDURE — 3017F COLORECTAL CA SCREEN DOC REV: CPT | Performed by: PSYCHIATRY & NEUROLOGY

## 2024-06-18 PROCEDURE — 3078F DIAST BP <80 MM HG: CPT | Performed by: PSYCHIATRY & NEUROLOGY

## 2024-06-18 PROCEDURE — 1090F PRES/ABSN URINE INCON ASSESS: CPT | Performed by: PSYCHIATRY & NEUROLOGY

## 2024-06-18 PROCEDURE — 1036F TOBACCO NON-USER: CPT | Performed by: PSYCHIATRY & NEUROLOGY

## 2024-06-18 PROCEDURE — 3074F SYST BP LT 130 MM HG: CPT | Performed by: PSYCHIATRY & NEUROLOGY

## 2024-06-18 PROCEDURE — G8417 CALC BMI ABV UP PARAM F/U: HCPCS | Performed by: PSYCHIATRY & NEUROLOGY

## 2024-06-18 PROCEDURE — G8400 PT W/DXA NO RESULTS DOC: HCPCS | Performed by: PSYCHIATRY & NEUROLOGY

## 2024-06-18 PROCEDURE — 1123F ACP DISCUSS/DSCN MKR DOCD: CPT | Performed by: PSYCHIATRY & NEUROLOGY

## 2024-06-18 PROCEDURE — 99215 OFFICE O/P EST HI 40 MIN: CPT | Performed by: PSYCHIATRY & NEUROLOGY

## 2024-06-18 ASSESSMENT — PATIENT HEALTH QUESTIONNAIRE - PHQ9
9. THOUGHTS THAT YOU WOULD BE BETTER OFF DEAD, OR OF HURTING YOURSELF: NOT AT ALL
7. TROUBLE CONCENTRATING ON THINGS, SUCH AS READING THE NEWSPAPER OR WATCHING TELEVISION: NOT AT ALL
8. MOVING OR SPEAKING SO SLOWLY THAT OTHER PEOPLE COULD HAVE NOTICED. OR THE OPPOSITE, BEING SO FIGETY OR RESTLESS THAT YOU HAVE BEEN MOVING AROUND A LOT MORE THAN USUAL: SEVERAL DAYS
SUM OF ALL RESPONSES TO PHQ QUESTIONS 1-9: 3
SUM OF ALL RESPONSES TO PHQ QUESTIONS 1-9: 3
4. FEELING TIRED OR HAVING LITTLE ENERGY: NOT AT ALL
10. IF YOU CHECKED OFF ANY PROBLEMS, HOW DIFFICULT HAVE THESE PROBLEMS MADE IT FOR YOU TO DO YOUR WORK, TAKE CARE OF THINGS AT HOME, OR GET ALONG WITH OTHER PEOPLE: NOT DIFFICULT AT ALL
5. POOR APPETITE OR OVEREATING: NOT AT ALL
2. FEELING DOWN, DEPRESSED OR HOPELESS: SEVERAL DAYS
6. FEELING BAD ABOUT YOURSELF - OR THAT YOU ARE A FAILURE OR HAVE LET YOURSELF OR YOUR FAMILY DOWN: SEVERAL DAYS
SUM OF ALL RESPONSES TO PHQ9 QUESTIONS 1 & 2: 1
SUM OF ALL RESPONSES TO PHQ QUESTIONS 1-9: 3
3. TROUBLE FALLING OR STAYING ASLEEP: NOT AT ALL
SUM OF ALL RESPONSES TO PHQ QUESTIONS 1-9: 3
1. LITTLE INTEREST OR PLEASURE IN DOING THINGS: NOT AT ALL

## 2024-06-18 NOTE — PROGRESS NOTES
6/18/2024 8:53 AM   Progress Note          Julieta Michelle 1955      Chief Complaint   Patient presents with    Medication Check         Subjective:    68-year-old white female with history of depression, DANIELA on CPAP, RLS, presents for follow up. On Prozac and Atarax PRN. Weight stable.     Patient comes with her .  Bright affect.  Smiling.  Further improvement in mood, sleep and energy levels.  No confusion but reports  forgetfulness.  Worries about everything.   feels anxiety contributes to memory issues. His mother had dementia.  feels pt worries about becoming like her.  Has not been seeing the therapist.  Discussed the need for therapy.  MOCA score 20/30 today - pt was visibly anxious.        /69   Pulse 71   Temp 98 °F (36.7 °C)   Ht 1.575 m (5' 2\")   Wt 86.5 kg (190 lb 11.2 oz)   SpO2 100%   BMI 34.88 kg/m²       Review of Systems - 14 point review:  Negative except for    Constitutional: (fevers, chills, night sweats, wt loss/gain, change in appetite, fatigue, somnolence)    HEENT: (ear pain or discharge, hearing loss, ear ringing, sinus pressure, nosebleed, nasal discharge, sore throat, oral sores, tooth pain, bleeding gums, hoarse voice, neck pain)      Cardiovascular: (HTN, chest pain, elevated cholesterol/lipids, palpitations, leg swelling, leg pain with walking)    Respiratory: (cough, wheezing, snoring, SOB with activity (dyspnea), SOB while lying flat (orthopnea), awakening with severe SOB (paroxysmal nocturnal dyspnea))    Gastrointestinal: (NVD, constipation, abdominal pain, bright red stools, black tarry stools, stool incontinence)     Genitourinary:  (pelvic pain, burning or frequency of urination, urinary urgency, blood in urine incomplete bladder emptying, urinary incontinence, STD; MEN: testicular pain or swelling, erectile dysfunction; WOMEN: LMP, heavy menstrual bleeding (menorrhagia), irregular periods, postmenopausal bleeding, menstrual pain

## 2024-07-17 ENCOUNTER — TELEPHONE (OUTPATIENT)
Dept: PSYCHIATRY | Age: 69
End: 2024-07-17

## 2024-07-17 NOTE — TELEPHONE ENCOUNTER
Called pt on 07/17/24 for appointment reminder for 07/18/24. Pt confirmed   ?   Reminded patient to complete their visit pre-check/digital registration in [facility].

## 2024-07-18 ENCOUNTER — OFFICE VISIT (OUTPATIENT)
Dept: PSYCHIATRY | Age: 69
End: 2024-07-18

## 2024-07-18 DIAGNOSIS — F41.1 GENERALIZED ANXIETY DISORDER: Primary | ICD-10-CM

## 2024-07-18 DIAGNOSIS — F33.40 DEPRESSION, MAJOR, RECURRENT, IN REMISSION (HCC): ICD-10-CM

## 2024-07-18 ASSESSMENT — PATIENT HEALTH QUESTIONNAIRE - PHQ9
SUM OF ALL RESPONSES TO PHQ9 QUESTIONS 1 & 2: 2
3. TROUBLE FALLING OR STAYING ASLEEP: NOT AT ALL
7. TROUBLE CONCENTRATING ON THINGS, SUCH AS READING THE NEWSPAPER OR WATCHING TELEVISION: NOT AT ALL
5. POOR APPETITE OR OVEREATING: NOT AT ALL
10. IF YOU CHECKED OFF ANY PROBLEMS, HOW DIFFICULT HAVE THESE PROBLEMS MADE IT FOR YOU TO DO YOUR WORK, TAKE CARE OF THINGS AT HOME, OR GET ALONG WITH OTHER PEOPLE: SOMEWHAT DIFFICULT
SUM OF ALL RESPONSES TO PHQ QUESTIONS 1-9: 4
9. THOUGHTS THAT YOU WOULD BE BETTER OFF DEAD, OR OF HURTING YOURSELF: NOT AT ALL
4. FEELING TIRED OR HAVING LITTLE ENERGY: SEVERAL DAYS
1. LITTLE INTEREST OR PLEASURE IN DOING THINGS: SEVERAL DAYS
SUM OF ALL RESPONSES TO PHQ QUESTIONS 1-9: 4
6. FEELING BAD ABOUT YOURSELF - OR THAT YOU ARE A FAILURE OR HAVE LET YOURSELF OR YOUR FAMILY DOWN: SEVERAL DAYS
SUM OF ALL RESPONSES TO PHQ QUESTIONS 1-9: 4
SUM OF ALL RESPONSES TO PHQ QUESTIONS 1-9: 4
8. MOVING OR SPEAKING SO SLOWLY THAT OTHER PEOPLE COULD HAVE NOTICED. OR THE OPPOSITE, BEING SO FIGETY OR RESTLESS THAT YOU HAVE BEEN MOVING AROUND A LOT MORE THAN USUAL: NOT AT ALL
2. FEELING DOWN, DEPRESSED OR HOPELESS: SEVERAL DAYS

## 2024-07-18 ASSESSMENT — ANXIETY QUESTIONNAIRES
7. FEELING AFRAID AS IF SOMETHING AWFUL MIGHT HAPPEN: NEARLY EVERY DAY
3. WORRYING TOO MUCH ABOUT DIFFERENT THINGS: NEARLY EVERY DAY
IF YOU CHECKED OFF ANY PROBLEMS ON THIS QUESTIONNAIRE, HOW DIFFICULT HAVE THESE PROBLEMS MADE IT FOR YOU TO DO YOUR WORK, TAKE CARE OF THINGS AT HOME, OR GET ALONG WITH OTHER PEOPLE: EXTREMELY DIFFICULT
1. FEELING NERVOUS, ANXIOUS, OR ON EDGE: MORE THAN HALF THE DAYS
5. BEING SO RESTLESS THAT IT IS HARD TO SIT STILL: SEVERAL DAYS
6. BECOMING EASILY ANNOYED OR IRRITABLE: MORE THAN HALF THE DAYS
4. TROUBLE RELAXING: NEARLY EVERY DAY
2. NOT BEING ABLE TO STOP OR CONTROL WORRYING: NEARLY EVERY DAY
GAD7 TOTAL SCORE: 17

## 2024-07-18 NOTE — PATIENT INSTRUCTIONS
Call the National Suicide Prevention Lifeline (Lifeline) at 0-808-256-WLJR (1256), or text the Crisis Text Line (text HELLO to 273202). Both services are free and available 24 hours a day, seven days a week. All calls are confidential.     The Veterans Crisis Line connects Service members and Veterans in crisis, as well as their family members and friends, with qualified Department of ’s Affairs (VA) responders through a confidential toll-free hotline, online chat, or text messaging service. Dial [phone] and Press 1 to talk to someone or send a text message to 458957 to connect with a VA responder.
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
## 2024-08-12 ENCOUNTER — OFFICE VISIT (OUTPATIENT)
Dept: NEUROLOGY | Age: 69
End: 2024-08-12
Payer: MEDICARE

## 2024-08-12 VITALS
HEART RATE: 58 BPM | SYSTOLIC BLOOD PRESSURE: 136 MMHG | OXYGEN SATURATION: 99 % | HEIGHT: 62 IN | DIASTOLIC BLOOD PRESSURE: 80 MMHG | BODY MASS INDEX: 36.07 KG/M2 | WEIGHT: 196 LBS

## 2024-08-12 DIAGNOSIS — Z76.89 ESTABLISHING CARE WITH NEW DOCTOR, ENCOUNTER FOR: ICD-10-CM

## 2024-08-12 DIAGNOSIS — G25.81 RESTLESS LEG SYNDROME: ICD-10-CM

## 2024-08-12 DIAGNOSIS — Z99.89 CPAP (CONTINUOUS POSITIVE AIRWAY PRESSURE) DEPENDENCE: ICD-10-CM

## 2024-08-12 DIAGNOSIS — G47.33 OBSTRUCTIVE SLEEP APNEA: Primary | ICD-10-CM

## 2024-08-12 PROCEDURE — G8427 DOCREV CUR MEDS BY ELIG CLIN: HCPCS | Performed by: PHYSICIAN ASSISTANT

## 2024-08-12 PROCEDURE — 1090F PRES/ABSN URINE INCON ASSESS: CPT | Performed by: PHYSICIAN ASSISTANT

## 2024-08-12 PROCEDURE — 1123F ACP DISCUSS/DSCN MKR DOCD: CPT | Performed by: PHYSICIAN ASSISTANT

## 2024-08-12 PROCEDURE — 3075F SYST BP GE 130 - 139MM HG: CPT | Performed by: PHYSICIAN ASSISTANT

## 2024-08-12 PROCEDURE — 3017F COLORECTAL CA SCREEN DOC REV: CPT | Performed by: PHYSICIAN ASSISTANT

## 2024-08-12 PROCEDURE — 3079F DIAST BP 80-89 MM HG: CPT | Performed by: PHYSICIAN ASSISTANT

## 2024-08-12 PROCEDURE — G8417 CALC BMI ABV UP PARAM F/U: HCPCS | Performed by: PHYSICIAN ASSISTANT

## 2024-08-12 PROCEDURE — G8400 PT W/DXA NO RESULTS DOC: HCPCS | Performed by: PHYSICIAN ASSISTANT

## 2024-08-12 PROCEDURE — 1036F TOBACCO NON-USER: CPT | Performed by: PHYSICIAN ASSISTANT

## 2024-08-12 PROCEDURE — 99214 OFFICE O/P EST MOD 30 MIN: CPT | Performed by: PHYSICIAN ASSISTANT

## 2024-08-12 NOTE — PROGRESS NOTES
[facility]  [address], [address]  [place] [address]  Phone [phone]  Fax [phone]       [facility] NEW PATIENT SLEEP CLINIC    Patient: Julieta Michelle  :  1955  Age:  68 y.o.  MRN:  [id number]  Today:  24    Provider:  Yolanda Sauceda PA-C    Chief Complaint:  Chief Complaint   Patient presents with    New Patient     Patient is being referred for DANIELA, she completed a sleep study 2024, was then set up on a PAP through [facility].    Sleep Apnea      DME report in media. Patient states no complaints.       History Source: History obtained from chart review and the patient.  PCP: Juan Carlos Goyal     Referring Provider: No referring provider defined for this encounter.    HISTORY OF PRESENT ILLNESS:   Julieta Michelle is a 68 y.o. year old female who  has a past medical history of Arthritis, Chronic back pain, CPAP (continuous positive airway pressure) dependence, Depression, Factor 5 Leiden mutation, heterozygous (HCC), Gastric ulcer, GERD (gastroesophageal reflux disease), GI bleed, History of blood transfusion, Hx of blood clots, Hyperlipidemia, Hypertension, Knee pain, Nasal fracture, DANIELA (obstructive sleep apnea), Pulmonary embolism (HCC), Thyroid disease, and Warfarin anticoagulation.     She was referred for s/p PSG/HST follow up, test results, and evaluation of current treatment plan.     The Home Sleep Test performed on 2024 showed obstructive sleep apnea with an apnea and hypopnea index of 16.7 events per hour.  She had oxygen desaturations as low as 73% and spent 25.7 minutes with an oxygen saturation less than 90%.  Her pulse varied from 55 to 88 and averaged 63.0 beats per minute.  Please see the data sheets for details.     The Home Sleep Test performed on 2024 showed obstructive sleep apnea with an apnea and hypopnea index of 21.5 events per hour.  She had oxygen desaturations as low as 59% and spent 76.0 minutes with an oxygen saturation less

## 2024-08-12 NOTE — PATIENT INSTRUCTIONS
Patient education: Sleep apnea in adults       INTRODUCTION -- Normally during sleep, air moves through the throat and in and out of the lungs at a regular rhythm. In a person with sleep apnea, air movement is periodically diminished or stopped. There are two types of sleep apnea: obstructive sleep apnea and central sleep apnea. In obstructive sleep apnea, breathing is abnormal because of narrowing or closure of the throat. In central sleep apnea, breathing is abnormal because of a change in the breathing control and rhythm.  Sleep apnea is a serious condition that can affect a person's ability to safely perform normal daily activities and can affect long term health. Approximately 25 percent of adults are at risk for sleep apnea of some degree.  Men are more commonly affected than women. Other risk factors include middle and older age, being overweight or obese, and having a small mouth and throat.  This topic review focuses on the most common type of sleep apnea in adults, obstructive sleep apnea (DANIELA).    HOW SLEEP APNEA OCCURS -- The throat is surrounded by muscles that control the airway for speaking, swallowing, and breathing. During sleep, these muscles are less active, and this causes the throat to narrow.  In most people, this narrowing does not affect breathing. In others, it can cause snoring, sometimes with reduced or completely blocked airflow.  A completely blocked airway without airflow is called an obstructive apnea. Partial obstruction with diminished airflow is called a hypopnea. A person may have apnea and hypopnea during sleep.  Insufficient breathing due to apnea or hypopnea causes oxygen levels to fall and carbon dioxide to rise. Because the airway is blocked, breathing faster or harder does not help to improve oxygen levels until the airway is reopened. Typically, the obstruction requires the person to awaken to activate the upper airway muscles. Once the airway is opened, the person then

## 2024-08-15 ENCOUNTER — HOSPITAL ENCOUNTER (OUTPATIENT)
Dept: MRI IMAGING | Age: 69
Discharge: HOME | End: 2024-08-15
Payer: COMMERCIAL

## 2024-08-15 DIAGNOSIS — M47.816 LUMBAR FACET ARTHROPATHY: ICD-10-CM

## 2024-08-15 DIAGNOSIS — M51.16 INTERVERTEBRAL DISC DISORDERS WITH RADICULOPATHY, LUMBAR REGION: ICD-10-CM

## 2024-08-15 DIAGNOSIS — M51.17 INTERVERTEBRAL DISC DISORDER WITH RADICULOPATHY OF LUMBOSACRAL REGION: ICD-10-CM

## 2024-08-15 DIAGNOSIS — M48.061 LUMBAR STENOSIS WITHOUT NEUROGENIC CLAUDICATION: ICD-10-CM

## 2024-08-15 DIAGNOSIS — M51.37 DISC DEGENERATION, LUMBOSACRAL: ICD-10-CM

## 2024-08-15 DIAGNOSIS — M47.817 LUMBOSACRAL FACET JOINT SYNDROME: ICD-10-CM

## 2024-08-15 DIAGNOSIS — M48.07 LUMBOSACRAL SPINAL STENOSIS: ICD-10-CM

## 2024-08-15 DIAGNOSIS — M51.36 LUMBAR DEGENERATIVE DISC DISEASE: ICD-10-CM

## 2024-08-15 PROCEDURE — 72148 MRI LUMBAR SPINE W/O DYE: CPT

## 2024-08-20 NOTE — TELEPHONE ENCOUNTER
08-24-21 forwarded M2A to  to let him know the M2A has been completed, Exported and ready to read. Patient is asking for a return call with results.  Cv     5J6 ETFL   Lot #[id number]    Routed to Dr James Ratliff No

## 2024-08-26 ENCOUNTER — TELEPHONE (OUTPATIENT)
Dept: PSYCHIATRY | Age: 69
End: 2024-08-26

## 2024-08-26 NOTE — TELEPHONE ENCOUNTER
Called pt on 08/26/24 for an appointment reminder for 08/27/24. Pt confirmed  ?   Reminded patient to complete their visit pre-check/digital registration in [employer].

## 2024-08-27 ENCOUNTER — OFFICE VISIT (OUTPATIENT)
Dept: PSYCHIATRY | Age: 69
End: 2024-08-27
Payer: MEDICARE

## 2024-08-27 DIAGNOSIS — F33.0 MILD RECURRENT MAJOR DEPRESSION (HCC): ICD-10-CM

## 2024-08-27 DIAGNOSIS — F41.1 GENERALIZED ANXIETY DISORDER: Primary | ICD-10-CM

## 2024-08-27 PROCEDURE — 90837 PSYTX W PT 60 MINUTES: CPT | Performed by: COUNSELOR

## 2024-08-27 PROCEDURE — 1123F ACP DISCUSS/DSCN MKR DOCD: CPT | Performed by: COUNSELOR

## 2024-08-27 PROCEDURE — 1036F TOBACCO NON-USER: CPT | Performed by: COUNSELOR

## 2024-08-27 ASSESSMENT — PATIENT HEALTH QUESTIONNAIRE - PHQ9
SUM OF ALL RESPONSES TO PHQ9 QUESTIONS 1 & 2: 2
SUM OF ALL RESPONSES TO PHQ QUESTIONS 1-9: 3
4. FEELING TIRED OR HAVING LITTLE ENERGY: SEVERAL DAYS
SUM OF ALL RESPONSES TO PHQ QUESTIONS 1-9: 3
9. THOUGHTS THAT YOU WOULD BE BETTER OFF DEAD, OR OF HURTING YOURSELF: NOT AT ALL
10. IF YOU CHECKED OFF ANY PROBLEMS, HOW DIFFICULT HAVE THESE PROBLEMS MADE IT FOR YOU TO DO YOUR WORK, TAKE CARE OF THINGS AT HOME, OR GET ALONG WITH OTHER PEOPLE: NOT DIFFICULT AT ALL
SUM OF ALL RESPONSES TO PHQ QUESTIONS 1-9: 3
1. LITTLE INTEREST OR PLEASURE IN DOING THINGS: SEVERAL DAYS
3. TROUBLE FALLING OR STAYING ASLEEP: NOT AT ALL
SUM OF ALL RESPONSES TO PHQ QUESTIONS 1-9: 3
6. FEELING BAD ABOUT YOURSELF - OR THAT YOU ARE A FAILURE OR HAVE LET YOURSELF OR YOUR FAMILY DOWN: NOT AT ALL
8. MOVING OR SPEAKING SO SLOWLY THAT OTHER PEOPLE COULD HAVE NOTICED. OR THE OPPOSITE, BEING SO FIGETY OR RESTLESS THAT YOU HAVE BEEN MOVING AROUND A LOT MORE THAN USUAL: NOT AT ALL
7. TROUBLE CONCENTRATING ON THINGS, SUCH AS READING THE NEWSPAPER OR WATCHING TELEVISION: NOT AT ALL
5. POOR APPETITE OR OVEREATING: NOT AT ALL
2. FEELING DOWN, DEPRESSED OR HOPELESS: SEVERAL DAYS

## 2024-08-27 ASSESSMENT — ANXIETY QUESTIONNAIRES
2. NOT BEING ABLE TO STOP OR CONTROL WORRYING: NEARLY EVERY DAY
IF YOU CHECKED OFF ANY PROBLEMS ON THIS QUESTIONNAIRE, HOW DIFFICULT HAVE THESE PROBLEMS MADE IT FOR YOU TO DO YOUR WORK, TAKE CARE OF THINGS AT HOME, OR GET ALONG WITH OTHER PEOPLE: EXTREMELY DIFFICULT
6. BECOMING EASILY ANNOYED OR IRRITABLE: SEVERAL DAYS
4. TROUBLE RELAXING: MORE THAN HALF THE DAYS
3. WORRYING TOO MUCH ABOUT DIFFERENT THINGS: NEARLY EVERY DAY
7. FEELING AFRAID AS IF SOMETHING AWFUL MIGHT HAPPEN: NEARLY EVERY DAY
GAD7 TOTAL SCORE: 17
1. FEELING NERVOUS, ANXIOUS, OR ON EDGE: NEARLY EVERY DAY
5. BEING SO RESTLESS THAT IT IS HARD TO SIT STILL: MORE THAN HALF THE DAYS

## 2024-08-27 NOTE — PROGRESS NOTES
Therapy Progress Note  Rachel Jane, [facility]   8/27/2024    Patient location  :[facility]  [facility] location : [facility]      Total time spent: 60 minutes  This is patient's second  Therapy appointment.  Chief Complaint:  depression, anxiety, and stress  Referring Provider: No referring provider defined for this encounter.      Julieta Michelle ,a 68 y.o. female, for follow up visit.     Pt provided informed consent for the behavioral health program. Discussed with patient model of service to include the limits of confidentiality (i.e. abuse reporting, suicide intervention, etc.) and short-term intervention focused approach.  Discussed no show and late cancellation policy.  Pt indicated understanding.    S:  Provider seeing patient in follow up for frequent worrying, difficulty managing worrying thoughts, and sadness.    Patient's  present in today's visit.  Patient requested  participate and gave provider verbal consent.  Patient discussed her  was bitten by a copperhead snake on his left hand since our last visit and was at risk of losing his fingers and was hospitalized for several days.  Patient reported she was very fearful and anxious throughout this situation.  Patient discussed dealing with a lot of guilt and shame for losing her oldest son in an automobile accident over twenty years ago, and over her second oldest son being incarcerated and having drug problems his whole life.  Patient reported feeling she failed as a parent.  Patient discussed she struggles letting these thoughts and feelings \"go.\"   Patient discussed she has been trying to engage in more pleasurable activities for herself and has plans to go camping this weekend with family.  Patient discussed when she is engaging in pleasurable activities she notices she still worries a lot and is always on \"alert.\"  Patient reported she is sleeping better at night, but still feels fatigue

## 2024-09-16 ENCOUNTER — TELEPHONE (OUTPATIENT)
Dept: PSYCHIATRY | Age: 69
End: 2024-09-16

## 2024-09-17 ENCOUNTER — OFFICE VISIT (OUTPATIENT)
Dept: PSYCHIATRY | Age: 69
End: 2024-09-17
Payer: MEDICARE

## 2024-09-17 VITALS
DIASTOLIC BLOOD PRESSURE: 75 MMHG | BODY MASS INDEX: 35.87 KG/M2 | SYSTOLIC BLOOD PRESSURE: 121 MMHG | OXYGEN SATURATION: 99 % | HEART RATE: 71 BPM | WEIGHT: 194.9 LBS | HEIGHT: 62 IN | TEMPERATURE: 97.6 F

## 2024-09-17 DIAGNOSIS — G47.00 INSOMNIA, UNSPECIFIED TYPE: ICD-10-CM

## 2024-09-17 DIAGNOSIS — F41.1 GENERALIZED ANXIETY DISORDER: ICD-10-CM

## 2024-09-17 DIAGNOSIS — F33.40 DEPRESSION, MAJOR, RECURRENT, IN REMISSION (HCC): Primary | ICD-10-CM

## 2024-09-17 PROCEDURE — 3017F COLORECTAL CA SCREEN DOC REV: CPT | Performed by: PSYCHIATRY & NEUROLOGY

## 2024-09-17 PROCEDURE — 99215 OFFICE O/P EST HI 40 MIN: CPT | Performed by: PSYCHIATRY & NEUROLOGY

## 2024-09-17 PROCEDURE — 1036F TOBACCO NON-USER: CPT | Performed by: PSYCHIATRY & NEUROLOGY

## 2024-09-17 PROCEDURE — G8428 CUR MEDS NOT DOCUMENT: HCPCS | Performed by: PSYCHIATRY & NEUROLOGY

## 2024-09-17 PROCEDURE — 1090F PRES/ABSN URINE INCON ASSESS: CPT | Performed by: PSYCHIATRY & NEUROLOGY

## 2024-09-17 PROCEDURE — G8400 PT W/DXA NO RESULTS DOC: HCPCS | Performed by: PSYCHIATRY & NEUROLOGY

## 2024-09-17 PROCEDURE — 1123F ACP DISCUSS/DSCN MKR DOCD: CPT | Performed by: PSYCHIATRY & NEUROLOGY

## 2024-09-17 PROCEDURE — G8417 CALC BMI ABV UP PARAM F/U: HCPCS | Performed by: PSYCHIATRY & NEUROLOGY

## 2024-09-17 PROCEDURE — 3078F DIAST BP <80 MM HG: CPT | Performed by: PSYCHIATRY & NEUROLOGY

## 2024-09-17 PROCEDURE — 3074F SYST BP LT 130 MM HG: CPT | Performed by: PSYCHIATRY & NEUROLOGY

## 2024-09-17 ASSESSMENT — PATIENT HEALTH QUESTIONNAIRE - PHQ9
SUM OF ALL RESPONSES TO PHQ9 QUESTIONS 1 & 2: 0
3. TROUBLE FALLING OR STAYING ASLEEP: NOT AT ALL
7. TROUBLE CONCENTRATING ON THINGS, SUCH AS READING THE NEWSPAPER OR WATCHING TELEVISION: NOT AT ALL
9. THOUGHTS THAT YOU WOULD BE BETTER OFF DEAD, OR OF HURTING YOURSELF: NOT AT ALL
SUM OF ALL RESPONSES TO PHQ QUESTIONS 1-9: 3
SUM OF ALL RESPONSES TO PHQ QUESTIONS 1-9: 3
10. IF YOU CHECKED OFF ANY PROBLEMS, HOW DIFFICULT HAVE THESE PROBLEMS MADE IT FOR YOU TO DO YOUR WORK, TAKE CARE OF THINGS AT HOME, OR GET ALONG WITH OTHER PEOPLE: SOMEWHAT DIFFICULT
SUM OF ALL RESPONSES TO PHQ QUESTIONS 1-9: 3
2. FEELING DOWN, DEPRESSED OR HOPELESS: NOT AT ALL
SUM OF ALL RESPONSES TO PHQ QUESTIONS 1-9: 3
6. FEELING BAD ABOUT YOURSELF - OR THAT YOU ARE A FAILURE OR HAVE LET YOURSELF OR YOUR FAMILY DOWN: SEVERAL DAYS
8. MOVING OR SPEAKING SO SLOWLY THAT OTHER PEOPLE COULD HAVE NOTICED. OR THE OPPOSITE, BEING SO FIGETY OR RESTLESS THAT YOU HAVE BEEN MOVING AROUND A LOT MORE THAN USUAL: SEVERAL DAYS
1. LITTLE INTEREST OR PLEASURE IN DOING THINGS: NOT AT ALL
4. FEELING TIRED OR HAVING LITTLE ENERGY: SEVERAL DAYS
5. POOR APPETITE OR OVEREATING: NOT AT ALL

## 2024-10-09 ENCOUNTER — TELEPHONE (OUTPATIENT)
Dept: HEMATOLOGY | Age: 69
End: 2024-10-09

## 2024-10-10 NOTE — PROGRESS NOTES
simvastatin (ZOCOR) 40 MG tablet, Take 1 tablet by mouth nightly, Disp: , Rfl:     calcium carbonate (TUMS) 500 MG chewable tablet, Take 3 tablets by mouth 2 times daily, Disp: , Rfl:     Triamterene-HCTZ (MAXZIDE PO), Take 37.5 mg by mouth daily Pt takes 37.5/25 mg daily, Disp: , Rfl:      Allergies   Allergen Reactions    Bacitracin-Polymyxin B Hives, Itching and Rash    Macrobid [Nitrofurantoin] Itching and Rash    Neomycin-Bacitracin-Polymyxin [Bacitracin-Neomycin-Polymyxin] Hives, Itching and Rash    Sulfa Antibiotics Hives, Itching and Rash       Social History     Tobacco Use    Smoking status: Never     Passive exposure: Never    Smokeless tobacco: Never   Vaping Use    Vaping status: Never Used   Substance Use Topics    Alcohol use: Not Currently    Drug use: Never       Family History   Problem Relation Age of Onset    Breast Cancer Mother          at age 48 years    Heart Attack Father         when pt 3 months old  of a fatal MI - was at age 56 years    No Known Problems Son          in MVA at age 21 years    No Known Problems Son     No Known Problems Son     No Known Problems Son     Colon Cancer Neg Hx     Colon Polyps Neg Hx     Esophageal Cancer Neg Hx     Liver Cancer Neg Hx     Rectal Cancer Neg Hx     Stomach Cancer Neg Hx        Subjective   REVIEW OF SYSTEMS:   Review of Systems   Constitutional:  Negative for chills, diaphoresis, fatigue, fever and unexpected weight change.   HENT:  Negative for mouth sores, nosebleeds, sore throat, trouble swallowing and voice change.    Eyes:  Negative for photophobia, discharge, itching and visual disturbance.   Respiratory:  Negative for cough, shortness of breath and wheezing.    Cardiovascular:  Negative for chest pain, palpitations and leg swelling.   Gastrointestinal:  Negative for abdominal distention, abdominal pain, blood in stool, constipation, diarrhea, nausea and vomiting.   Endocrine: Negative for cold intolerance, heat intolerance,

## 2024-10-11 ENCOUNTER — HOSPITAL ENCOUNTER (OUTPATIENT)
Dept: INFUSION THERAPY | Age: 69
Discharge: HOME | End: 2024-10-11
Payer: MEDICARE

## 2024-10-11 ENCOUNTER — TELEPHONE (OUTPATIENT)
Dept: INFUSION THERAPY | Age: 69
End: 2024-10-11

## 2024-10-11 ENCOUNTER — OFFICE VISIT (OUTPATIENT)
Dept: HEMATOLOGY | Age: 69
End: 2024-10-11

## 2024-10-11 VITALS
BODY MASS INDEX: 36.33 KG/M2 | SYSTOLIC BLOOD PRESSURE: 118 MMHG | DIASTOLIC BLOOD PRESSURE: 68 MMHG | OXYGEN SATURATION: 99 % | HEART RATE: 67 BPM | HEIGHT: 62 IN | TEMPERATURE: 97.7 F | WEIGHT: 197.4 LBS

## 2024-10-11 DIAGNOSIS — D50.0 IRON DEFICIENCY ANEMIA DUE TO CHRONIC BLOOD LOSS: Primary | ICD-10-CM

## 2024-10-11 DIAGNOSIS — Z79.01 LONG TERM (CURRENT) USE OF ANTICOAGULANTS: ICD-10-CM

## 2024-10-11 DIAGNOSIS — D50.0 IRON DEFICIENCY ANEMIA DUE TO CHRONIC BLOOD LOSS: ICD-10-CM

## 2024-10-11 LAB
ALBUMIN SERPL-MCNC: 4.5 G/DL (ref 3.5–5.2)
ALP SERPL-CCNC: 77 U/L (ref 35–104)
ALT SERPL-CCNC: 13 U/L (ref 5–33)
ANION GAP SERPL CALCULATED.3IONS-SCNC: 14 MMOL/L (ref 7–19)
AST SERPL-CCNC: 27 U/L (ref 5–32)
BASOPHILS # BLD: 0.02 K/UL (ref 0.01–0.08)
BASOPHILS NFR BLD: 0.5 % (ref 0.1–1.2)
BILIRUB SERPL-MCNC: 0.3 MG/DL (ref 0.2–1.2)
BUN SERPL-MCNC: 17 MG/DL (ref 8–23)
CALCIUM SERPL-MCNC: 8.1 MG/DL (ref 8.8–10.2)
CHLORIDE SERPL-SCNC: 101 MMOL/L (ref 98–111)
CO2 SERPL-SCNC: 26 MMOL/L (ref 22–29)
CREAT SERPL-MCNC: 1 MG/DL (ref 0.5–0.9)
EOSINOPHIL # BLD: 0.06 K/UL (ref 0.04–0.54)
EOSINOPHIL NFR BLD: 1.4 % (ref 0.7–7)
ERYTHROCYTE [DISTWIDTH] IN BLOOD BY AUTOMATED COUNT: 13.3 % (ref 11.7–14.4)
FERRITIN SERPL-MCNC: 146.4 NG/ML (ref 13–150)
GLUCOSE SERPL-MCNC: 86 MG/DL (ref 70–99)
HCT VFR BLD AUTO: 36.7 % (ref 34.1–44.9)
HGB BLD-MCNC: 12 G/DL (ref 11.2–15.7)
INR PPP: 2.41 (ref 0.88–1.18)
IRON SATN MFR SERPL: 18 % (ref 14–50)
IRON SERPL-MCNC: 58 UG/DL (ref 37–145)
LYMPHOCYTES # BLD: 0.67 K/UL (ref 1.18–3.74)
LYMPHOCYTES NFR BLD: 16 % (ref 19.3–53.1)
MCH RBC QN AUTO: 29.7 PG (ref 25.6–32.2)
MCHC RBC AUTO-ENTMCNC: 32.7 G/DL (ref 32.3–35.5)
MCV RBC AUTO: 90.8 FL (ref 79.4–94.8)
MONOCYTES # BLD: 0.17 K/UL (ref 0.24–0.82)
MONOCYTES NFR BLD: 4.1 % (ref 4.7–12.5)
NEUTROPHILS # BLD: 3.26 K/UL (ref 1.56–6.13)
NEUTS SEG NFR BLD: 77.8 % (ref 34–71.1)
PLATELET # BLD AUTO: 223 K/UL (ref 182–369)
PMV BLD AUTO: 9.2 FL (ref 7.4–10.4)
POTASSIUM SERPL-SCNC: 3.7 MMOL/L (ref 3.5–5)
PROT SERPL-MCNC: 7.8 G/DL (ref 6.4–8.3)
PROTHROMBIN TIME: 25.6 SEC (ref 12–14.6)
RBC # BLD AUTO: 4.04 M/UL (ref 3.93–5.22)
SODIUM SERPL-SCNC: 141 MMOL/L (ref 136–145)
TIBC SERPL-MCNC: 318 UG/DL (ref 250–400)
WBC # BLD AUTO: 4.19 K/UL (ref 3.98–10.04)

## 2024-10-11 PROCEDURE — 99212 OFFICE O/P EST SF 10 MIN: CPT

## 2024-10-11 PROCEDURE — 85025 COMPLETE CBC W/AUTO DIFF WBC: CPT

## 2024-10-11 PROCEDURE — 36415 COLL VENOUS BLD VENIPUNCTURE: CPT

## 2024-10-11 ASSESSMENT — ENCOUNTER SYMPTOMS
SORE THROAT: 0
SHORTNESS OF BREATH: 0
EYE DISCHARGE: 0
COLOR CHANGE: 0
VOICE CHANGE: 0
EYE ITCHING: 0
ABDOMINAL PAIN: 0
DIARRHEA: 0
PHOTOPHOBIA: 0
COUGH: 0
BACK PAIN: 0
VOMITING: 0
WHEEZING: 0
BLOOD IN STOOL: 0
TROUBLE SWALLOWING: 0
CONSTIPATION: 0
ABDOMINAL DISTENTION: 0
NAUSEA: 0

## 2024-10-11 NOTE — TELEPHONE ENCOUNTER
VM left regarding INR 2.41. Per cade continue current dose coumadin and follow up with Dr. Goyal. I left my office number if she has any questions.

## 2024-10-28 ENCOUNTER — TELEPHONE (OUTPATIENT)
Dept: PSYCHIATRY | Age: 69
End: 2024-10-28

## 2024-10-28 NOTE — TELEPHONE ENCOUNTER
Appointment canceled for Julieta Michelle ([id number])   Visit type:  FOLLOW UP   10/29/2024 9:00 AM (60 minutes) with Rachel JONES in [facility]      Reason for cancellation: Patient preference     Pt cancelled appt through [facility].    Electronically signed by April Dowling MA on 10/28/2024 at 12:08 PM

## 2024-10-29 ENCOUNTER — TRANSCRIBE ORDERS (OUTPATIENT)
Dept: ADMINISTRATIVE | Facility: HOSPITAL | Age: 69
End: 2024-10-29
Payer: MEDICARE

## 2024-10-29 DIAGNOSIS — Z12.31 ENCOUNTER FOR SCREENING MAMMOGRAM FOR MALIGNANT NEOPLASM OF BREAST: Primary | ICD-10-CM

## 2024-11-12 ENCOUNTER — HOSPITAL ENCOUNTER (OUTPATIENT)
Dept: MAMMOGRAPHY | Facility: HOSPITAL | Age: 69
Discharge: HOME | End: 2024-11-12
Admitting: NURSE PRACTITIONER
Payer: MEDICARE

## 2024-11-12 DIAGNOSIS — Z12.31 ENCOUNTER FOR SCREENING MAMMOGRAM FOR MALIGNANT NEOPLASM OF BREAST: ICD-10-CM

## 2024-11-12 PROCEDURE — 77063 BREAST TOMOSYNTHESIS BI: CPT

## 2024-11-12 PROCEDURE — 77067 SCR MAMMO BI INCL CAD: CPT

## 2024-12-03 ENCOUNTER — TELEPHONE (OUTPATIENT)
Dept: PSYCHIATRY | Age: 69
End: 2024-12-03

## 2024-12-03 NOTE — TELEPHONE ENCOUNTER
Called pt to cancel/reschedule appt for 03/18/25 with Dr. Eisenberg because the provider will not be in the office at that time.    Rescheduled for 04/23/25 @ 10:30.    Electronically signed by April Dowling MA on 12/3/2024 at 2:53 PM

## 2025-02-12 ENCOUNTER — OFFICE VISIT (OUTPATIENT)
Dept: NEUROLOGY | Age: 70
End: 2025-02-12

## 2025-02-12 VITALS
SYSTOLIC BLOOD PRESSURE: 130 MMHG | HEART RATE: 82 BPM | WEIGHT: 202 LBS | HEIGHT: 62 IN | DIASTOLIC BLOOD PRESSURE: 78 MMHG | OXYGEN SATURATION: 97 % | BODY MASS INDEX: 37.17 KG/M2

## 2025-02-12 DIAGNOSIS — Z99.89 CPAP (CONTINUOUS POSITIVE AIRWAY PRESSURE) DEPENDENCE: ICD-10-CM

## 2025-02-12 DIAGNOSIS — G25.81 RESTLESS LEG SYNDROME: ICD-10-CM

## 2025-02-12 DIAGNOSIS — G47.33 OBSTRUCTIVE SLEEP APNEA: Primary | ICD-10-CM

## 2025-02-12 RX ORDER — HYDROCODONE BITARTRATE AND ACETAMINOPHEN 10; 325 MG/1; MG/1
TABLET ORAL
COMMUNITY
Start: 2025-02-03

## 2025-02-12 NOTE — PROGRESS NOTES
REVIEW OF SYSTEMS    Constitutional: []Fever []Sweats []Chills [] Recent Injury [x] Denies all unless marked  HEENT:[]Headache  [] Head Injury [] Hearing Loss  [] Sore Throat  [] Ear Ache [x] Denies all unless marked  Spine:  [] Neck pain  [] Back pain  [] Sciaticia  [x] Denies all unless marked  Cardiovascular:[]Heart Disease []Palpitations [] Chest Pain   [x] Denies all unless marked  Pulmonary: []Shortness of Breath []Cough   [x] Denies all unless marked  Psychiatric/Behavioral:[] Depression [] Anxiety [x] Denies all unless marked  Gastrointestinal: []Nausea  []Vomiting  []Abdominal Pain  []Constipation  []Diarrhea  [x] Denies all unless marked  Genitourinary:   [] Frequency  [] Urgency  [] Dysuria [] Incontinence  [x] Denies all unless marked  Extremities: []Pain  []Swelling  [x] Denies all unless marked  Musculoskeletal: [] Myalgias  [] Joint Pain  [] Arthritis [] Muscle Cramps [] Muscle Twitches  [x] Denies all unless marked  Sleep: []Insomnia[]Snoring []Restless Legs  [x]Sleep Apnea  []Daytime Sleepiness  [x] Denies all unless marked  Skin:[] Rash [] Color Change [x] Denies all unless marked   Neurological:[]Visual Disturbance [] Memory Loss []Loss of Balance []Slurred Speech []Weakness []Seizures  [] Dizziness [x] Denies all unless marked     
spent 20 minutes caring for Julieta Michelle on this date of service. This time includes time spent by me in the following activities:preparing for the visit, reviewing tests, performing a medically appropriate examination and/or evaluation , counseling and educating the patient/family/caregiver, ordering medications, tests, or procedures, documenting information in the medical record and care coordination

## 2025-02-12 NOTE — PATIENT INSTRUCTIONS
Suggestions for dry mouth or opening mouth:      Your cpap supplier should offer products that might be helpful if you otherwise like your mask. One is the Gecko nasal pad. It is an easy to use gel pad that is placed across the nose bridge and under the mask. It helps with leak in the upper part of the mask frame and is beneficial to patients who have a narrow nose bridge or who are prone to cuts or irritation to the nose bridge. You may also want to try MaskMate, a CPAP Mask Sealer and Lubricant. Another product patients find helpful are SoreSpot CPAP Skin Protectors. They are a unique liquid-filled bandage that minimizes friction and helps prevent skin irritation. Check out [employer]’s full face and nasal mask liners. They are applied directly to the silicone mask cushion to help absorb facial moisture and oils, and prevent skin irritation and pressure marks as well as to reduce noisy mask leak.     Try Biotene oral spray or Xylimelt at bedtime. Both can be purchased over the counter.       Patient education: Sleep apnea in adults       INTRODUCTION -- Normally during sleep, air moves through the throat and in and out of the lungs at a regular rhythm. In a person with sleep apnea, air movement is periodically diminished or stopped. There are two types of sleep apnea: obstructive sleep apnea and central sleep apnea. In obstructive sleep apnea, breathing is abnormal because of narrowing or closure of the throat. In central sleep apnea, breathing is abnormal because of a change in the breathing control and rhythm.  Sleep apnea is a serious condition that can affect a person's ability to safely perform normal daily activities and can affect long term health. Approximately 25 percent of adults are at risk for sleep apnea of some degree.  Men are more commonly affected than women. Other risk factors include middle and older age, being overweight or obese, and having a small mouth and throat.  This topic review focuses on

## 2025-04-07 ENCOUNTER — TELEPHONE (OUTPATIENT)
Dept: HEMATOLOGY | Age: 70
End: 2025-04-07

## 2025-04-07 NOTE — TELEPHONE ENCOUNTER
Called Patient and reminded patient of their appointment on 04/11/2025 and patient confirmed they would be here. Reminded patient to just come at appointment time, and to not come at the lab appointment time. Reminded patient that we will not check them in any more than 30 minutes before appointment time.  We have now moved to the [facility] [place] that is located between our old office and the ER at the [facility]. Letting the Pt know that our front entrance faces the  [place]. Reminded pt to eat well and be well hydrated for their labs.

## 2025-04-10 DIAGNOSIS — D50.0 IRON DEFICIENCY ANEMIA DUE TO CHRONIC BLOOD LOSS: Primary | ICD-10-CM

## 2025-04-10 NOTE — PROGRESS NOTES
This will continue to be monitored conservatively.    PLAN  Continue monitor CBC      3.  History of DVT/PE/heterozygous factor V Leiden -not at goal    She reports she is currently on four +1 mg equals 5 mg daily.      4. Health maintenance: Received influenza and COVID-19 vaccines this past year. Mammogram normal in 11/2024. Repeat colonoscopy scheduled for next year.  - Ensure pneumonia vaccine is up to date  - Schedule colonoscopy for next year        The patient (or guardian, if applicable) and other individuals in attendance with the patient were advised that Artificial Intelligence will be utilized during this visit to record, process the conversation to generate a clinical note, and support improvement of the AI technology. The patient (or guardian, if applicable) and other individuals in attendance at the appointment consented to the use of AI, including the recording.      HEALTH MAINTENANCE    Breast cancer screening.  Bilateral screening mammogram at [facility] on 11/12/2024 was BIRADS 1.      Colon cancer screening. Colonoscopy per Dr. Alaniz 6/25/2021 revealed 8 mm diameter sessile hepatic polyp (tubular adenoma)     Cervical/GYN cancer screening.  Prior hysterectomy    Orders this visit  CBC with Diff  Iron panel  Ferritin  CMP      Return in about 6 months (around 10/11/2025) for With Gee Bello.  CBC and reevaluation    Gee Vigil PA-C  10:02 AM  4/11/2025

## 2025-04-11 ENCOUNTER — OFFICE VISIT (OUTPATIENT)
Dept: HEMATOLOGY | Age: 70
End: 2025-04-11
Payer: MEDICARE

## 2025-04-11 ENCOUNTER — HOSPITAL ENCOUNTER (OUTPATIENT)
Dept: INFUSION THERAPY | Age: 70
Discharge: HOME | End: 2025-04-11
Payer: MEDICARE

## 2025-04-11 VITALS
TEMPERATURE: 98.2 F | DIASTOLIC BLOOD PRESSURE: 80 MMHG | OXYGEN SATURATION: 99 % | HEART RATE: 61 BPM | HEIGHT: 62 IN | BODY MASS INDEX: 37.8 KG/M2 | SYSTOLIC BLOOD PRESSURE: 130 MMHG | WEIGHT: 205.4 LBS

## 2025-04-11 DIAGNOSIS — D50.0 IRON DEFICIENCY ANEMIA DUE TO CHRONIC BLOOD LOSS: Primary | ICD-10-CM

## 2025-04-11 DIAGNOSIS — D50.0 IRON DEFICIENCY ANEMIA DUE TO CHRONIC BLOOD LOSS: ICD-10-CM

## 2025-04-11 DIAGNOSIS — Z79.01 LONG TERM (CURRENT) USE OF ANTICOAGULANTS: ICD-10-CM

## 2025-04-11 LAB
ALBUMIN SERPL-MCNC: 4.2 G/DL (ref 3.5–5.2)
ALP SERPL-CCNC: 79 U/L (ref 35–104)
ALT SERPL-CCNC: 13 U/L (ref 5–33)
ANION GAP SERPL CALCULATED.3IONS-SCNC: 14 MMOL/L (ref 7–19)
AST SERPL-CCNC: 28 U/L (ref 5–32)
BASOPHILS # BLD: 0.01 K/UL (ref 0–0.2)
BASOPHILS NFR BLD: 0.2 % (ref 0–1)
BILIRUB SERPL-MCNC: 0.3 MG/DL (ref 0–1.2)
BUN SERPL-MCNC: 15 MG/DL (ref 8–23)
CALCIUM SERPL-MCNC: 8.3 MG/DL (ref 8.8–10.2)
CHLORIDE SERPL-SCNC: 101 MMOL/L (ref 98–107)
CO2 SERPL-SCNC: 29 MMOL/L (ref 22–29)
CREAT SERPL-MCNC: 0.8 MG/DL (ref 0.5–0.9)
EOSINOPHIL # BLD: 0.04 K/UL (ref 0–0.6)
EOSINOPHIL NFR BLD: 0.8 % (ref 0–5)
ERYTHROCYTE [DISTWIDTH] IN BLOOD BY AUTOMATED COUNT: 13.1 % (ref 11.5–14.5)
FERRITIN SERPL-MCNC: 115 NG/ML (ref 13–150)
GLUCOSE SERPL-MCNC: 82 MG/DL (ref 70–99)
HCT VFR BLD AUTO: 35.6 % (ref 37–47)
HGB BLD-MCNC: 11.9 G/DL (ref 12–16)
IRON SATN MFR SERPL: 27 % (ref 15–50)
IRON SERPL-MCNC: 85 UG/DL (ref 37–145)
LYMPHOCYTES # BLD: 0.66 K/UL (ref 1.1–4.5)
LYMPHOCYTES NFR BLD: 14 % (ref 20–40)
MCH RBC QN AUTO: 30.6 PG (ref 27–31)
MCHC RBC AUTO-ENTMCNC: 33.4 G/DL (ref 33–37)
MCV RBC AUTO: 91.5 FL (ref 81–99)
MONOCYTES # BLD: 0.26 K/UL (ref 0–0.9)
MONOCYTES NFR BLD: 5.5 % (ref 1–10)
NEUTROPHILS # BLD: 3.74 K/UL (ref 1.5–7.5)
NEUTS SEG NFR BLD: 79.3 % (ref 50–65)
PLATELET # BLD AUTO: 218 K/UL (ref 130–400)
PMV BLD AUTO: 9.1 FL (ref 9.4–12.3)
POTASSIUM SERPL-SCNC: 4.4 MMOL/L (ref 3.5–5.1)
PROT SERPL-MCNC: 7.3 G/DL (ref 6.4–8.3)
RBC # BLD AUTO: 3.89 M/UL (ref 4.2–5.4)
SODIUM SERPL-SCNC: 144 MMOL/L (ref 136–145)
TIBC SERPL-MCNC: 311 UG/DL (ref 250–400)
WBC # BLD AUTO: 4.72 K/UL (ref 4.8–10.8)

## 2025-04-11 PROCEDURE — 82728 ASSAY OF FERRITIN: CPT

## 2025-04-11 PROCEDURE — G8427 DOCREV CUR MEDS BY ELIG CLIN: HCPCS | Performed by: PHYSICIAN ASSISTANT

## 2025-04-11 PROCEDURE — 1036F TOBACCO NON-USER: CPT | Performed by: PHYSICIAN ASSISTANT

## 2025-04-11 PROCEDURE — 1126F AMNT PAIN NOTED NONE PRSNT: CPT | Performed by: PHYSICIAN ASSISTANT

## 2025-04-11 PROCEDURE — G2211 COMPLEX E/M VISIT ADD ON: HCPCS | Performed by: PHYSICIAN ASSISTANT

## 2025-04-11 PROCEDURE — 1123F ACP DISCUSS/DSCN MKR DOCD: CPT | Performed by: PHYSICIAN ASSISTANT

## 2025-04-11 PROCEDURE — G8417 CALC BMI ABV UP PARAM F/U: HCPCS | Performed by: PHYSICIAN ASSISTANT

## 2025-04-11 PROCEDURE — 36415 COLL VENOUS BLD VENIPUNCTURE: CPT

## 2025-04-11 PROCEDURE — 80053 COMPREHEN METABOLIC PANEL: CPT

## 2025-04-11 PROCEDURE — 85025 COMPLETE CBC W/AUTO DIFF WBC: CPT

## 2025-04-11 PROCEDURE — 1090F PRES/ABSN URINE INCON ASSESS: CPT | Performed by: PHYSICIAN ASSISTANT

## 2025-04-11 PROCEDURE — 3079F DIAST BP 80-89 MM HG: CPT | Performed by: PHYSICIAN ASSISTANT

## 2025-04-11 PROCEDURE — 99214 OFFICE O/P EST MOD 30 MIN: CPT | Performed by: PHYSICIAN ASSISTANT

## 2025-04-11 PROCEDURE — 99212 OFFICE O/P EST SF 10 MIN: CPT

## 2025-04-11 PROCEDURE — 83550 IRON BINDING TEST: CPT

## 2025-04-11 PROCEDURE — G8400 PT W/DXA NO RESULTS DOC: HCPCS | Performed by: PHYSICIAN ASSISTANT

## 2025-04-11 PROCEDURE — 1159F MED LIST DOCD IN RCRD: CPT | Performed by: PHYSICIAN ASSISTANT

## 2025-04-11 PROCEDURE — 3017F COLORECTAL CA SCREEN DOC REV: CPT | Performed by: PHYSICIAN ASSISTANT

## 2025-04-11 PROCEDURE — 3075F SYST BP GE 130 - 139MM HG: CPT | Performed by: PHYSICIAN ASSISTANT

## 2025-04-11 PROCEDURE — 83540 ASSAY OF IRON: CPT

## 2025-04-11 ASSESSMENT — ENCOUNTER SYMPTOMS
BLOOD IN STOOL: 0
EYE ITCHING: 0
NAUSEA: 0
VOICE CHANGE: 0
EYE DISCHARGE: 0
SHORTNESS OF BREATH: 0
ABDOMINAL DISTENTION: 0
COLOR CHANGE: 0
COUGH: 0
BACK PAIN: 0
VOMITING: 0
PHOTOPHOBIA: 0
WHEEZING: 0
DIARRHEA: 0
ABDOMINAL PAIN: 0
SORE THROAT: 0
TROUBLE SWALLOWING: 0
CONSTIPATION: 0

## 2025-04-22 ENCOUNTER — TELEPHONE (OUTPATIENT)
Dept: PSYCHIATRY | Age: 70
End: 2025-04-22

## 2025-04-22 NOTE — TELEPHONE ENCOUNTER
Called patient to remind them of their appointment   -left voicemail, requesting a return call  Reminded patient to complete their visit pre-check/digital registration in [employer].    Electronically signed by April Dowling MA on 4/22/2025 at 3:56 PM

## 2025-04-23 ENCOUNTER — OFFICE VISIT (OUTPATIENT)
Dept: PSYCHIATRY | Age: 70
End: 2025-04-23
Payer: MEDICARE

## 2025-04-23 VITALS
SYSTOLIC BLOOD PRESSURE: 120 MMHG | BODY MASS INDEX: 37.37 KG/M2 | DIASTOLIC BLOOD PRESSURE: 69 MMHG | HEART RATE: 76 BPM | OXYGEN SATURATION: 100 % | TEMPERATURE: 97.8 F | HEIGHT: 62 IN | WEIGHT: 203.1 LBS

## 2025-04-23 DIAGNOSIS — R41.3 MEMORY LOSS: ICD-10-CM

## 2025-04-23 DIAGNOSIS — F41.1 GENERALIZED ANXIETY DISORDER: ICD-10-CM

## 2025-04-23 DIAGNOSIS — F33.0 MILD RECURRENT MAJOR DEPRESSION: Primary | ICD-10-CM

## 2025-04-23 PROCEDURE — 3078F DIAST BP <80 MM HG: CPT | Performed by: PSYCHIATRY & NEUROLOGY

## 2025-04-23 PROCEDURE — 1036F TOBACCO NON-USER: CPT | Performed by: PSYCHIATRY & NEUROLOGY

## 2025-04-23 PROCEDURE — 3074F SYST BP LT 130 MM HG: CPT | Performed by: PSYCHIATRY & NEUROLOGY

## 2025-04-23 PROCEDURE — 99215 OFFICE O/P EST HI 40 MIN: CPT | Performed by: PSYCHIATRY & NEUROLOGY

## 2025-04-23 PROCEDURE — G8417 CALC BMI ABV UP PARAM F/U: HCPCS | Performed by: PSYCHIATRY & NEUROLOGY

## 2025-04-23 PROCEDURE — 1123F ACP DISCUSS/DSCN MKR DOCD: CPT | Performed by: PSYCHIATRY & NEUROLOGY

## 2025-04-23 PROCEDURE — 1090F PRES/ABSN URINE INCON ASSESS: CPT | Performed by: PSYCHIATRY & NEUROLOGY

## 2025-04-23 PROCEDURE — G8400 PT W/DXA NO RESULTS DOC: HCPCS | Performed by: PSYCHIATRY & NEUROLOGY

## 2025-04-23 PROCEDURE — G8428 CUR MEDS NOT DOCUMENT: HCPCS | Performed by: PSYCHIATRY & NEUROLOGY

## 2025-04-23 PROCEDURE — 3017F COLORECTAL CA SCREEN DOC REV: CPT | Performed by: PSYCHIATRY & NEUROLOGY

## 2025-04-23 ASSESSMENT — PATIENT HEALTH QUESTIONNAIRE - PHQ9
SUM OF ALL RESPONSES TO PHQ QUESTIONS 1-9: 2
10. IF YOU CHECKED OFF ANY PROBLEMS, HOW DIFFICULT HAVE THESE PROBLEMS MADE IT FOR YOU TO DO YOUR WORK, TAKE CARE OF THINGS AT HOME, OR GET ALONG WITH OTHER PEOPLE: NOT DIFFICULT AT ALL
8. MOVING OR SPEAKING SO SLOWLY THAT OTHER PEOPLE COULD HAVE NOTICED. OR THE OPPOSITE, BEING SO FIGETY OR RESTLESS THAT YOU HAVE BEEN MOVING AROUND A LOT MORE THAN USUAL: NOT AT ALL
SUM OF ALL RESPONSES TO PHQ QUESTIONS 1-9: 2
1. LITTLE INTEREST OR PLEASURE IN DOING THINGS: NOT AT ALL
9. THOUGHTS THAT YOU WOULD BE BETTER OFF DEAD, OR OF HURTING YOURSELF: NOT AT ALL
5. POOR APPETITE OR OVEREATING: NOT AT ALL
7. TROUBLE CONCENTRATING ON THINGS, SUCH AS READING THE NEWSPAPER OR WATCHING TELEVISION: NOT AT ALL
4. FEELING TIRED OR HAVING LITTLE ENERGY: SEVERAL DAYS
3. TROUBLE FALLING OR STAYING ASLEEP: NOT AT ALL
6. FEELING BAD ABOUT YOURSELF - OR THAT YOU ARE A FAILURE OR HAVE LET YOURSELF OR YOUR FAMILY DOWN: SEVERAL DAYS
SUM OF ALL RESPONSES TO PHQ QUESTIONS 1-9: 2
SUM OF ALL RESPONSES TO PHQ QUESTIONS 1-9: 2
2. FEELING DOWN, DEPRESSED OR HOPELESS: NOT AT ALL

## 2025-04-23 NOTE — PROGRESS NOTES
4/23/2025 11:02 AM   Progress Note          Julieta Michelle 1955      Chief Complaint   Patient presents with    Depression    Anxiety    Insomnia         Subjective:    69-year-old white female with history of depression, memory loss, DANIELA on CPAP, RLS, presents for follow up. On Prozac and Atarax PRN. Compliant. No side effects. Weight stable. MOCA score 20/30 in June; 18/30 today.     Patient comes with her .  Smiling. Talkative. Good mood. Some anxiety - coping well.  Forgetful - worse per  - mainly STR memory. Physically active. No issues with ADLs or IADLs. Helps  with the farm. They have a lab puppy- [place]. Grandbaby coming in July.        /69   Pulse 76   Temp 97.8 °F (36.6 °C)   Ht 1.575 m (5' 2\")   Wt 92.1 kg (203 lb 1.6 oz)   SpO2 100%   BMI 37.15 kg/m²       Review of Systems - 14 point review:  Negative except for    Constitutional: (fevers, chills, night sweats, wt loss/gain, change in appetite, fatigue, somnolence)    HEENT: (ear pain or discharge, hearing loss, ear ringing, sinus pressure, nosebleed, nasal discharge, sore throat, oral sores, tooth pain, bleeding gums, hoarse voice, neck pain)      Cardiovascular: (HTN, chest pain, elevated cholesterol/lipids, palpitations, leg swelling, leg pain with walking)    Respiratory: (cough, wheezing, snoring, SOB with activity (dyspnea), SOB while lying flat (orthopnea), awakening with severe SOB (paroxysmal nocturnal dyspnea))    Gastrointestinal: (NVD, constipation, abdominal pain, bright red stools, black tarry stools, stool incontinence)     Genitourinary:  (pelvic pain, burning or frequency of urination, urinary urgency, blood in urine incomplete bladder emptying, urinary incontinence, STD; MEN: testicular pain or swelling, erectile dysfunction; WOMEN: LMP, heavy menstrual bleeding (menorrhagia), irregular periods, postmenopausal bleeding, menstrual pain (dymenorrhea, vaginal discharge)    Musculoskeletal: (bone

## 2025-05-01 ENCOUNTER — TELEPHONE (OUTPATIENT)
Dept: NEUROLOGY | Age: 70
End: 2025-05-01

## 2025-05-01 NOTE — TELEPHONE ENCOUNTER
1st attempt: Received a referral for this patient. Called and left patient a VM to call our office back to where we could get patient scheduled an appointment with Dr. Batista.

## 2025-07-14 ENCOUNTER — OFFICE VISIT (OUTPATIENT)
Dept: NEUROLOGY | Age: 70
End: 2025-07-14
Payer: MEDICARE

## 2025-07-14 VITALS
BODY MASS INDEX: 37.36 KG/M2 | SYSTOLIC BLOOD PRESSURE: 130 MMHG | RESPIRATION RATE: 16 BRPM | WEIGHT: 203 LBS | HEIGHT: 62 IN | DIASTOLIC BLOOD PRESSURE: 74 MMHG

## 2025-07-14 DIAGNOSIS — R41.3 MEMORY LOSS: Primary | ICD-10-CM

## 2025-07-14 DIAGNOSIS — G25.81 RESTLESS LEG SYNDROME: ICD-10-CM

## 2025-07-14 DIAGNOSIS — R41.3 MEMORY LOSS: ICD-10-CM

## 2025-07-14 DIAGNOSIS — Z86.59 HISTORY OF ANXIETY: ICD-10-CM

## 2025-07-14 DIAGNOSIS — Z79.01 ANTICOAGULATED: ICD-10-CM

## 2025-07-14 LAB — VIT B12 SERPL-MCNC: 726 PG/ML (ref 232–1245)

## 2025-07-14 PROCEDURE — 1159F MED LIST DOCD IN RCRD: CPT | Performed by: PSYCHIATRY & NEUROLOGY

## 2025-07-14 PROCEDURE — 3017F COLORECTAL CA SCREEN DOC REV: CPT | Performed by: PSYCHIATRY & NEUROLOGY

## 2025-07-14 PROCEDURE — 1090F PRES/ABSN URINE INCON ASSESS: CPT | Performed by: PSYCHIATRY & NEUROLOGY

## 2025-07-14 PROCEDURE — 3075F SYST BP GE 130 - 139MM HG: CPT | Performed by: PSYCHIATRY & NEUROLOGY

## 2025-07-14 PROCEDURE — 99203 OFFICE O/P NEW LOW 30 MIN: CPT | Performed by: PSYCHIATRY & NEUROLOGY

## 2025-07-14 PROCEDURE — G8417 CALC BMI ABV UP PARAM F/U: HCPCS | Performed by: PSYCHIATRY & NEUROLOGY

## 2025-07-14 PROCEDURE — 1036F TOBACCO NON-USER: CPT | Performed by: PSYCHIATRY & NEUROLOGY

## 2025-07-14 PROCEDURE — G8400 PT W/DXA NO RESULTS DOC: HCPCS | Performed by: PSYCHIATRY & NEUROLOGY

## 2025-07-14 PROCEDURE — G8427 DOCREV CUR MEDS BY ELIG CLIN: HCPCS | Performed by: PSYCHIATRY & NEUROLOGY

## 2025-07-14 PROCEDURE — 3078F DIAST BP <80 MM HG: CPT | Performed by: PSYCHIATRY & NEUROLOGY

## 2025-07-14 PROCEDURE — 1123F ACP DISCUSS/DSCN MKR DOCD: CPT | Performed by: PSYCHIATRY & NEUROLOGY

## 2025-07-14 RX ORDER — DONEPEZIL HYDROCHLORIDE 5 MG/1
TABLET, FILM COATED ORAL
Qty: 30 TABLET | Refills: 2 | Status: SHIPPED | OUTPATIENT
Start: 2025-07-14

## 2025-07-14 NOTE — PROGRESS NOTES
REVIEW OF SYSTEMS    Constitutional: []Fever []Sweat []Chills [] Recent Injury [x] Denies all unless marked  HEENT:[]Headache  [] Head Injury/Hearing Loss  [] Sore Throat  [] Ear Ache/Dizziness  [x] Denies all unless marked  Spine:  [] Neck pain  [] Back pain  [] Sciaticia  [x] Denies all unless marked  Cardiovascular:[]Heart Disease []Chest Pain [] Palpitations  [x] Denies all unless marked  Pulmonary: []Shortness of Breath []Cough   [x] Denies all unless marke  Gastrointestinal: []Nausea  []Vomiting  []Abdominal Pain  []Constipation  []Diarrhea  []Dark Bloody Stools  [x] Denies all unless marked  Psychiatric/Behavioral:[] Depression [] Anxiety [x] Denies all unless marked  Genitourinary:   [] Frequency  [] Urgency  [] Incontinence [] Pain with Urination  [x] Denies all unless marked  Extremities: []Pain  []Swelling  [x] Denies all unless marked  Musculoskeletal: [] Muscle Pain  [] Joint Pain  [] Arthritis [] Muscle Cramps [] Muscle Twitches  [x] Denies all unless marked  Sleep: [] Insomnia [] Snoring [] Restless Legs [] Sleep Apnea  [] Daytime Sleepiness  [x] Denies all unless marked  Skin:[] Rash [] Skin Discoloration [x] Denies all unless marked   Neurological: []Visual Disturbance/Memory Loss [] Loss of Balance [] Slurred Speech/Weakness [] Seizures  [] Vertigo/Dizziness [x] Denies all unless marked     
(before breakfast) 30 tablet 9    Cholecalciferol (VITAMIN D3) 50 MCG (2000 UT) CAPS Take 1,000 Units by mouth 2 times daily      potassium chloride (KLOR-CON M) 20 MEQ extended release tablet Take 1 tablet by mouth 2 times daily      simvastatin (ZOCOR) 40 MG tablet Take 1 tablet by mouth nightly      calcium carbonate (TUMS) 500 MG chewable tablet Take 3 tablets by mouth 2 times daily      Triamterene-HCTZ (MAXZIDE PO) Take 37.5 mg by mouth daily Pt takes 37.5/25 mg daily       No current facility-administered medications for this visit.       Outpatient Medications Marked as Taking for the 7/14/25 encounter (Office Visit) with Heber Batista MD   Medication Sig Dispense Refill    donepezil (ARICEPT) 5 MG tablet Take one each am with breakfast 30 tablet 2    HYDROcodone-acetaminophen (NORCO)  MG per tablet       FLUoxetine (PROZAC) 40 MG capsule Take 1 capsule by mouth daily      magnesium oxide (MAG-OX) 400 MG tablet Take 1 tablet by mouth daily      levothyroxine (SYNTHROID) 88 MCG tablet Take 1 tablet by mouth Daily      warfarin (COUMADIN) 4 MG tablet Take 1 tablet by mouth Daily with supper      pantoprazole (PROTONIX) 40 MG tablet Take 1 tablet by mouth every morning (before breakfast) 30 tablet 9    Cholecalciferol (VITAMIN D3) 50 MCG (2000 UT) CAPS Take 1,000 Units by mouth 2 times daily      potassium chloride (KLOR-CON M) 20 MEQ extended release tablet Take 1 tablet by mouth 2 times daily      simvastatin (ZOCOR) 40 MG tablet Take 1 tablet by mouth nightly      calcium carbonate (TUMS) 500 MG chewable tablet Take 3 tablets by mouth 2 times daily      Triamterene-HCTZ (MAXZIDE PO) Take 37.5 mg by mouth daily Pt takes 37.5/25 mg daily         /74   Resp 16   Ht 1.575 m (5' 2\")   Wt 92.1 kg (203 lb)   BMI 37.13 kg/m²       Constitutional - well developed, well nourished.    Eyes - conjunctiva normal.  Pupils react to light  Ear, nose, throat -hearing intact to finger rub No scars, masses, or

## 2025-07-16 LAB
AL BETA40 PLAS-MCNC: 189.98 PG/ML
AL BETA42 PLAS-MCNC: 20.97 PG/ML
BETA-AMYLOID 42/40 RATIO: 0.11

## 2025-07-17 ENCOUNTER — HOSPITAL ENCOUNTER (OUTPATIENT)
Dept: MRI IMAGING | Age: 70
Discharge: HOME | End: 2025-07-17
Payer: MEDICARE

## 2025-07-17 DIAGNOSIS — R41.3 MEMORY LOSS: ICD-10-CM

## 2025-07-17 LAB — P-TAU217: POSITIVE

## 2025-07-17 PROCEDURE — 70551 MRI BRAIN STEM W/O DYE: CPT

## (undated) DEVICE — CHLORAPREP 26ML ORANGE

## (undated) DEVICE — GLOVE SURG SZ 85 L12IN FNGR THK79MIL GRN LTX FREE

## (undated) DEVICE — ENDO KIT,LOURDES HOSPITAL: Brand: MEDLINE INDUSTRIES, INC.

## (undated) DEVICE — SOLUTION IRRIG 3000ML 0.9% SOD CHL USP UROMATIC PLAS CONT

## (undated) DEVICE — DUAL CUT SAGITTAL BLADE

## (undated) DEVICE — FORCEPS BX L240CM JAW DIA2.4MM ORNG L CAP W/ NDL DISP RAD

## (undated) DEVICE — SYSTEM SKIN CLSR 22CM DERMBND PRINEO

## (undated) DEVICE — BANDAGE COMPR W3INXL15FT BGE E SGL LAYERED CLP CLSR

## (undated) DEVICE — SUTURE VCRL SZ 2-0 L36IN ABSRB UD L36MM CT-1 1/2 CIR J945H

## (undated) DEVICE — TUBE ET 7MM NSL ORAL BASIC CUF INTMED MURPHY EYE RADPQ MRK

## (undated) DEVICE — SURGICAL PROCEDURE PACK KNEE TOT DBD CDS LOURDES HOSP LF

## (undated) DEVICE — BLADE LARYNGOSCOPE LP 3 NS GLIDESCOPE SPECTRUM DISP

## (undated) DEVICE — RECIPROCATING BLADE DOUBLE SIDE (74.0 X 0.77MM)

## (undated) DEVICE — GLOVE SURG SZ 85 CRM LTX FREE POLYISOPRENE POLYMER BEAD ANTI

## (undated) DEVICE — DRESSING FOAM W4XL12IN SIL RECT ADH WTRPRF FLM BK W/ BORD

## (undated) DEVICE — SHEET,DRAPE,53X77,STERILE: Brand: MEDLINE

## (undated) DEVICE — SNARE ENDOSCP L240CM LOOP W13MM SHTH DIA2.4MM SM OVL FLX

## (undated) DEVICE — GOWN,PREVENTION PLUS,XL,ST,24/CS: Brand: MEDLINE

## (undated) DEVICE — SUTURE ABSRB BRAID COAT UD CP NO 2 27IN VCRL J195H

## (undated) DEVICE — CEMENT MIXING SYSTEM WITH FEMORAL BREAKWAY NOZZLE: Brand: REVOLUTION

## (undated) DEVICE — SUTURE VCRL SZ 3-0 L27IN ABSRB UD L19MM PS-2 3/8 CIR PRIM J427H